# Patient Record
Sex: MALE | Race: WHITE | NOT HISPANIC OR LATINO | Employment: FULL TIME | URBAN - METROPOLITAN AREA
[De-identification: names, ages, dates, MRNs, and addresses within clinical notes are randomized per-mention and may not be internally consistent; named-entity substitution may affect disease eponyms.]

---
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
## 2019-05-10 ENCOUNTER — OFFICE VISIT (OUTPATIENT)
Dept: FAMILY MEDICINE | Facility: CLINIC | Age: 19
End: 2019-05-10
Payer: COMMERCIAL

## 2019-05-10 VITALS
HEIGHT: 71 IN | WEIGHT: 168 LBS | OXYGEN SATURATION: 99 % | BODY MASS INDEX: 23.52 KG/M2 | TEMPERATURE: 98.2 F | DIASTOLIC BLOOD PRESSURE: 74 MMHG | RESPIRATION RATE: 16 BRPM | HEART RATE: 69 BPM | SYSTOLIC BLOOD PRESSURE: 130 MMHG

## 2019-05-10 DIAGNOSIS — S43.61XS: Primary | ICD-10-CM

## 2019-05-10 PROCEDURE — 99213 OFFICE O/P EST LOW 20 MIN: CPT | Performed by: FAMILY MEDICINE

## 2019-05-10 RX ORDER — KETOROLAC TROMETHAMINE 10 MG/1
10 TABLET, FILM COATED ORAL EVERY 6 HOURS PRN
Qty: 20 TABLET | Refills: 0 | Status: SHIPPED | OUTPATIENT
Start: 2019-05-10 | End: 2019-08-05

## 2019-05-10 ASSESSMENT — MIFFLIN-ST. JEOR: SCORE: 1799.17

## 2019-05-10 ASSESSMENT — PATIENT HEALTH QUESTIONNAIRE - PHQ9
5. POOR APPETITE OR OVEREATING: SEVERAL DAYS
SUM OF ALL RESPONSES TO PHQ QUESTIONS 1-9: 1

## 2019-05-10 ASSESSMENT — ANXIETY QUESTIONNAIRES
2. NOT BEING ABLE TO STOP OR CONTROL WORRYING: NOT AT ALL
1. FEELING NERVOUS, ANXIOUS, OR ON EDGE: SEVERAL DAYS
3. WORRYING TOO MUCH ABOUT DIFFERENT THINGS: NOT AT ALL
7. FEELING AFRAID AS IF SOMETHING AWFUL MIGHT HAPPEN: NOT AT ALL
IF YOU CHECKED OFF ANY PROBLEMS ON THIS QUESTIONNAIRE, HOW DIFFICULT HAVE THESE PROBLEMS MADE IT FOR YOU TO DO YOUR WORK, TAKE CARE OF THINGS AT HOME, OR GET ALONG WITH OTHER PEOPLE: NOT DIFFICULT AT ALL
5. BEING SO RESTLESS THAT IT IS HARD TO SIT STILL: SEVERAL DAYS
GAD7 TOTAL SCORE: 5
6. BECOMING EASILY ANNOYED OR IRRITABLE: MORE THAN HALF THE DAYS

## 2019-05-10 NOTE — PATIENT INSTRUCTIONS
Thank you for choosing [facility] (formerly [facility]  You may be receiving an email and/or telephone survey request from [facility] Customer Experience regarding your visit today.  Please take a few minutes to respond to the survey to let us know how we are doing.      If you have questions or concerns, please contact us via Veacon or you can contact your care team at [phone].    Our Clinic hours are:  Monday 6:40 am  to 7:00 pm  Tuesday -Friday 6:40 am to 5:00 pm    The [place] outpatient lab hours are:  Monday - Friday 6:10 am to 4:45 pm  Saturdays 7:00 am to 11:00 am  Appointments are required, call [phone]    If you have clinical questions after hours or would like to schedule an appointment,  call the clinic at [phone].    (S43.61XS) Sternoclavicular (joint) (ligament) sprain, right, sequela  (primary encounter diagnosis)  Comment:   Plan: ketorolac (TORADOL) 10 MG tablet, ORTHO          REFERRAL        We discussed the issues and there is likely loose ligaments in the right Sternoclaviclar joint. Modify activities to avoid stressing the joint.   Use the ice on the area, and heat if there are tight muscles. Toradol at 10 mg per dose with non spicy food. Avoid aspirin and advil and aleve when on Toradol.   The referral to Ortho is done and go there now or call for the appt.

## 2019-05-10 NOTE — PROGRESS NOTES
"  SUBJECTIVE:   Ivan Guzman is a 18 year old male who presents to clinic today for the following   health issues:      COLLAR BONE      Duration: 4 years    Description (location/character/radiation): Right clavicle pain, if he moves the area a wrong way it will pop out and back in.    Intensity:  Moderate-pain daily.      Accompanying signs and symptoms: Daily pain, pops in and out about twice daily.  No swelling.    History (similar episodes/previous evaluation): Previous Chiropractor, PT and Cortisone injections.  Cortisone would help for one week then symptoms returned.      Precipitating or alleviating factors: From back behind his head and over, also just straight up will cause the issues.  Worse with weight and movement.    Therapies tried and outcome: Ibuprofen as needed.         Current Outpatient Medications:      buPROPion (WELLBUTRIN SR) 100 MG 12 hr tablet, Take 1 tablet (100 mg) by mouth daily (Patient not taking: Reported on 5/10/2019), Disp: 90 tablet, Rfl: 3     buPROPion (WELLBUTRIN SR) 200 MG 12 hr tablet, Take 1 tablet (200 mg) by mouth daily (Patient not taking: Reported on 5/10/2019), Disp: 90 tablet, Rfl: 3    Patient Active Problem List   Diagnosis     CARDIOVASCULAR SCREENING; LDL GOAL LESS THAN 160     Anxiety     Depression, unspecified depression type       Blood pressure 130/74, pulse 69, temperature 98.2  F (36.8  C), temperature source Tympanic, resp. rate 16, height 1.803 m (5' 11\"), weight 76.2 kg (168 lb), SpO2 99 %.    Exam:  GENERAL APPEARANCE: healthy, alert and no distress  MS: tender to palpation right sternoclavicular joint, and no other tenderness in the shoulders or clavicles or sternum. He has some limited ROM of the right shoulder.    SKIN: no suspicious lesions or rashes  PSYCH: mentation appears normal and affect normal/bright  \  (S43.61XS) Sternoclavicular (joint) (ligament) sprain, right, sequela  (primary encounter diagnosis)  Comment:   Plan: ketorolac (TORADOL) 10 " MG tablet, ORTHO          REFERRAL        We discussed the issues and there is likely loose ligaments in the right Sternoclaviclar joint. Modify activities to avoid stressing the joint.   Use the ice on the area, and heat if there are tight muscles. Toradol at 10 mg per dose with non spicy food. Avoid aspirin and advil and aleve when on Toradol.   The referral to Ortho is done and go there now or call for the appt.     Malcom Crawford

## 2019-05-11 ASSESSMENT — ANXIETY QUESTIONNAIRES: GAD7 TOTAL SCORE: 5

## 2019-05-17 ENCOUNTER — TRANSFERRED RECORDS (OUTPATIENT)
Dept: HEALTH INFORMATION MANAGEMENT | Facility: CLINIC | Age: 19
End: 2019-05-17

## 2019-06-04 ENCOUNTER — HOSPITAL ENCOUNTER (OUTPATIENT)
Dept: MRI IMAGING | Facility: CLINIC | Age: 19
Discharge: HOME | End: 2019-06-04
Attending: ORTHOPAEDIC SURGERY | Admitting: ORTHOPAEDIC SURGERY
Payer: COMMERCIAL

## 2019-06-04 DIAGNOSIS — M89.8X1 CLAVICLE PAIN: ICD-10-CM

## 2019-06-04 DIAGNOSIS — R07.89 STERNUM PAIN: ICD-10-CM

## 2019-06-04 PROCEDURE — 71550 MRI CHEST W/O DYE: CPT

## 2019-06-17 ENCOUNTER — ANCILLARY PROCEDURE (OUTPATIENT)
Dept: GENERAL RADIOLOGY | Facility: CLINIC | Age: 19
End: 2019-06-17
Attending: ORTHOPAEDIC SURGERY
Payer: COMMERCIAL

## 2019-06-17 ENCOUNTER — OFFICE VISIT (OUTPATIENT)
Dept: ORTHOPEDICS | Facility: CLINIC | Age: 19
End: 2019-06-17
Attending: FAMILY MEDICINE
Payer: COMMERCIAL

## 2019-06-17 ENCOUNTER — TRANSFERRED RECORDS (OUTPATIENT)
Dept: HEALTH INFORMATION MANAGEMENT | Facility: CLINIC | Age: 19
End: 2019-06-17

## 2019-06-17 ENCOUNTER — PRE VISIT (OUTPATIENT)
Dept: ORTHOPEDICS | Facility: CLINIC | Age: 19
End: 2019-06-17

## 2019-06-17 VITALS — BODY MASS INDEX: 24.36 KG/M2 | HEIGHT: 71 IN | WEIGHT: 174 LBS

## 2019-06-17 DIAGNOSIS — M25.511 PAIN OF RIGHT STERNOCLAVICULAR JOINT: Primary | ICD-10-CM

## 2019-06-17 DIAGNOSIS — M25.511 PAIN OF RIGHT STERNOCLAVICULAR JOINT: ICD-10-CM

## 2019-06-17 DIAGNOSIS — M25.511 STERNOCLAVICULAR JOINT PAIN, RIGHT: Primary | ICD-10-CM

## 2019-06-17 ASSESSMENT — MIFFLIN-ST. JEOR: SCORE: 1821.39

## 2019-06-17 NOTE — TELEPHONE ENCOUNTER
RECORDS RECEIVED FROM: Sternoclavicular (joint) (ligament) sprain, right, Records are in [facility], [facility] everywhere, [facility]. per pt. Referred by Malcom Crawford MD   DATE RECEIVED: Jun 17, 2019    NOTES STATUS DETAILS   OFFICE NOTE from referring provider Internal Dr. Crawford 5/10/19   OFFICE NOTE from other specialist Received in [facility] 5/17/19   DISCHARGE SUMMARY from hospital N/A    DISCHARGE REPORT from the ER N/A    OPERATIVE REPORT N/A    MEDICATION LIST Internal    IMPLANT RECORD/STICKER N/A    LABS     CBC/DIFF N/A    CULTURES N/A    INJECTIONS DONE IN RADIOLOGY N/A    MRI Internal 6/4/19   CT SCAN N/A    XRAYS (IMAGES & REPORTS) Internal 12/15/15   TUMOR     PATHOLOGY  Slides & report N/A      06/17/19   10:55 AM  No records found in Care Everywhere

## 2019-06-17 NOTE — LETTER
6/17/2019       RE: Ivan Guzman  [address]   [facility] [address]     Dear Colleague,    Thank you for referring your patient, Ivan Guzman, to the [facility]. Please see a copy of my visit note below.    Orthopedic Surgery Consult / History and Physical           Chief Concern:   Right SC joint instability           History of Present Illness:   This patient is a right hand dominant 19 year old male with no medical history who presents for evaluation of the above.    Patient reports no history of injury to the right shoulder girdle, however ever since he can remember as a child he has had an unstable right sternoclavicular joint which is resulted in subluxations and anterior dislocations.  The joint always auto reduces, he is never needed to be manipulated by a medical provider to reduce the joint.  For much of his adolescence this was a nonpainful phenomenon, however the last 4 years every time the joint has subluxed or dislocated it is become quite painful.  As result of this he no longer is able to do any overhead activities without subluxations of the joint.  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  At this point over the last 6 weeks but it is been so unstable and painful that he is only able to reach out to open doors with his right upper extremity as anything more than that will cause a painful subluxation or dislocation.       Symptom Profile  Location of symptoms:   Focused at right SC joint  Quality of symptoms:   Sharp, achy  Severity:   Moderate-severe  Exacerbating:    Overhead shoulder movements  Alleviate:   Arm below shoulder level  Previous Treatments: Steroid injection, oral steroids, physical therapy.  All without any benefit              Past Medical History:   None  No past medical history on file.         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    Social History     Tobacco Use     Smoking status: Never Smoker     Smokeless tobacco: Never Used   Substance Use Topics     Alcohol use: No            Family History:   Denies family history of bleeding or clotting disorders  Family History   Problem Relation Age of Onset     Hypertension Maternal Grandfather      Cancer Paternal Grandmother         colon cancer            Allergies:     Allergies   Allergen Reactions     Nka [No Known Allergies]             Medications:   Anticoagulants: None  Current Outpatient Medications   Medication     buPROPion (WELLBUTRIN SR) 100 MG 12 hr tablet     buPROPion (WELLBUTRIN SR) 200 MG 12 hr tablet     ketorolac (TORADOL) 10 MG tablet     No current facility-administered medications for this visit.               Physical Exam:   General: awake, alert, cooperative, no apparent distress, appears stated age  HEENT: normal  Respiratory: breathing non-labored  Cardiovascular: peripheral pulse palpable, capillary refill < 2sec  Skin: no rashes or lesions  Heme: No petechiae  Neurological: CN II-XII grossly intact  Musculoskeletal:   Right UE       Skin c/d/i        SILT R/U/M/Ax       Motor:  FPL, EPL, abductors with 5/5 strength       Perfusion:  Warm and well perfused, palpable radial pulse   ROM: Full range of motion present of the shoulder though associated with SC joint subluxation and  pain    Sternoclavicular joint clearly subluxes after 120 degrees of forward flexion, auto reduces the arm is let down   Moderate tenderness to palpation of the right sternoclavicular joint             Data:   Imaging:  Today's x-rays of the clavicles demonstrate wearing of the medial aspect of the right clavicle relative to the contralateral side.    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     Plan:  1. Discussed with the patient risks benefits alternatives of a right SC joint reconstruction with allograft.  We did discuss that there are significant risks involved given the proximity of the great vessels immediately posterior to the sternoclavicular joint.  In light of that we would do this in conjunction with a cardiothoracic surgeon available and this would be done on the [facility].  We noted that the outcomes from the surgery are not as pristine as most of the surgeries that we do, and we have found that a year after the operation, about 40% of the patients are back to their baseline and not feeling improved by the surgery at all.  2. Patient feels that the disability related to his right SC joint is great enough, that he does wish to proceed with surgery despite the associated risks of the surgery and also the risk of return to this functional status.  3. We will order a CT angiogram for evaluation of the aortic arch and its branches in their proximity to the sternoclavicular joint where we will be operating.  4. We will coordinate with our cardiothoracic surgery colleagues to find a time that would work for them to be on standby during the case.  Again the case would need to be done on the [facility].  As long as there are no complications, most patients leave and go  home the same day.      Seen and examined with Dr. Crane, documentation by:    Gabriel Cordova MD  Orthopaedic Surgery PGY-5  I saw the patient with the resident.  I agree with the resident note and plan of care.      Luca Crane MD

## 2019-06-17 NOTE — PROGRESS NOTES
Orthopedic Surgery Consult / History and Physical           Chief Concern:   Right SC joint instability           History of Present Illness:   This patient is a right hand dominant 19 year old male with no medical history who presents for evaluation of the above.    Patient reports no history of injury to the right shoulder girdle, however ever since he can remember as a child he has had an unstable right sternoclavicular joint which is resulted in subluxations and anterior dislocations.  The joint always auto reduces, he is never needed to be manipulated by a medical provider to reduce the joint.  For much of his adolescence this was a nonpainful phenomenon, however the last 4 years every time the joint has subluxed or dislocated it is become quite painful.  As result of this he no longer is able to do any overhead activities without subluxations of the joint.  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  At this point over the last 6 weeks but it is been so unstable and painful that he is only able to reach out to open doors with his right upper extremity as anything more than that will cause a painful subluxation or dislocation.       Symptom Profile  Location of symptoms:   Focused at right SC joint  Quality of symptoms:   Sharp, achy  Severity:   Moderate-severe  Exacerbating:    Overhead shoulder movements  Alleviate:   Arm below shoulder level  Previous Treatments: Steroid injection, oral steroids, physical therapy.  All without any benefit              Past Medical History:   None  No past medical history on file.         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    Social History     Tobacco Use     Smoking status: Never Smoker     Smokeless tobacco: Never Used   Substance Use Topics     Alcohol use: No            Family History:   Denies family history of bleeding or clotting disorders  Family History   Problem Relation Age of Onset     Hypertension Maternal Grandfather      Cancer Paternal Grandmother         colon cancer            Allergies:     Allergies   Allergen Reactions     Nka [No Known Allergies]             Medications:   Anticoagulants: None  Current Outpatient Medications   Medication     buPROPion (WELLBUTRIN SR) 100 MG 12 hr tablet     buPROPion (WELLBUTRIN SR) 200 MG 12 hr tablet     ketorolac (TORADOL) 10 MG tablet     No current facility-administered medications for this visit.               Physical Exam:   General: awake, alert, cooperative, no apparent distress, appears stated age  HEENT: normal  Respiratory: breathing non-labored  Cardiovascular: peripheral pulse palpable, capillary refill < 2sec  Skin: no rashes or lesions  Heme: No petechiae  Neurological: CN II-XII grossly intact  Musculoskeletal:   Right UE       Skin c/d/i        SILT R/U/M/Ax       Motor:  FPL, EPL, abductors with 5/5 strength       Perfusion:  Warm and well perfused, palpable radial pulse   ROM: Full range of motion present of the shoulder though associated with SC joint subluxation and pain    Sternoclavicular joint clearly subluxes after 120 degrees of forward flexion, auto reduces the arm is let down   Moderate tenderness to palpation of the right sternoclavicular joint             Data:   Imaging:  Today's x-rays of the clavicles demonstrate wearing of the medial aspect of the  right clavicle relative to the contralateral side.    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     Plan:  1. Discussed with the patient risks benefits alternatives of a right SC joint reconstruction with allograft.  We did discuss that there are significant risks involved given the proximity of the great vessels immediately posterior to the sternoclavicular joint.  In light of that we would do this in conjunction with a cardiothoracic surgeon available and this would be done on the [facility].  We noted that the outcomes from the surgery are not as pristine as most of the surgeries that we do, and we have found that a year after the operation, about 40% of the patients are back to their baseline and not feeling improved by the surgery at all.  2. Patient feels that the disability related to his right SC joint is great enough, that he does wish to proceed with surgery despite the associated risks of the surgery and also the risk of return to this functional status.  3. We will order a CT angiogram for evaluation of the aortic arch and its branches in their proximity to the sternoclavicular joint where we will be operating.  4. We will coordinate with our cardiothoracic surgery colleagues to find a time that would work for them to be on standby during the case.  Again the case would need to be done on the [facility].  As long as there are no complications, most patients leave and go home the same day.      Seen and examined with Dr. Crane, documentation by:    Gabriel Cordova MD  Orthopaedic Surgery PGY-5  I saw the patient with the resident.  I agree with the resident note and plan of care.      Luca Crane MD

## 2019-06-17 NOTE — NURSING NOTE
"Reason For Visit:   Chief Complaint   Patient presents with     Consult     Sternoclavicular joint sprain. Has issues with the clavicle popping in and out of joint.        PCP: Sebastien Guerrero    ?no  Occupation Not working  Date of injury: None  Date of surgery: None  Smoker: None    Right hand dominant    SANE score  Affected shoulder: Right  Right shoulder SANE: 40  Left shoulder SANE: 100    Dynamometer  Forward Elevation:  R = 10 pounds,  L = 15 pounds  External Rotation:   R = 15 pounds,  L = 16 pounds    Ht 1.803 m (5' 11\")   Wt 78.9 kg (174 lb)   BMI 24.27 kg/m        Pain Assessment  Patient Currently in Pain: Yes  0-10 Pain Scale: 5  Primary Pain Location: Shoulder  Pain Descriptors: Dull      Ryann Soto LPN      "

## 2019-06-18 ENCOUNTER — ANCILLARY PROCEDURE (OUTPATIENT)
Dept: CT IMAGING | Facility: CLINIC | Age: 19
End: 2019-06-18
Attending: ORTHOPAEDIC SURGERY
Payer: COMMERCIAL

## 2019-06-18 DIAGNOSIS — M25.511 STERNOCLAVICULAR JOINT PAIN, RIGHT: ICD-10-CM

## 2019-06-18 RX ORDER — IOPAMIDOL 755 MG/ML
80 INJECTION, SOLUTION INTRAVASCULAR ONCE
Status: COMPLETED | OUTPATIENT
Start: 2019-06-18 | End: 2019-06-18

## 2019-06-18 RX ORDER — IOPAMIDOL 755 MG/ML
100 INJECTION, SOLUTION INTRAVASCULAR ONCE
Status: DISCONTINUED | OUTPATIENT
Start: 2019-06-18 | End: 2019-06-18

## 2019-06-18 RX ADMIN — IOPAMIDOL 80 ML: 755 INJECTION, SOLUTION INTRAVASCULAR at 18:10

## 2019-06-18 NOTE — DISCHARGE INSTRUCTIONS

## 2019-08-05 ENCOUNTER — OFFICE VISIT (OUTPATIENT)
Dept: ORTHOPEDICS | Facility: CLINIC | Age: 19
End: 2019-08-05
Payer: COMMERCIAL

## 2019-08-05 DIAGNOSIS — M25.511 STERNOCLAVICULAR JOINT PAIN, RIGHT: Primary | ICD-10-CM

## 2019-08-05 NOTE — NURSING NOTE
Reason For Visit:   Chief Complaint   Patient presents with     RECHECK     F/U CT for sternoclavicular joint     PCP: Sebastien Guerrero     ?no  Occupation Not working  Date of injury: None  Date of surgery: None  Smoker: None     Right hand dominant      SANE score  Affected shoulder: Right  Right shoulder SANE: 25  Left shoulder SANE: 100    There were no vitals taken for this visit.      Pain Assessment  Patient Currently in Pain: Yes  0-10 Pain Scale: 4  Primary Pain Location: Shoulder  Pain Descriptors: Discomfort    Ryann Soto LPN

## 2019-08-05 NOTE — PROGRESS NOTES
CHIEF COMPLAINT:  Right shoulder sternoclavicular joint instability.      HISTORY OF PRESENT ILLNESS:  Mr. Guzman returns today for followup.  He notes that his symptoms are worsening.  He tolerated his CT scan well.      I reviewed with him the findings on his CT scan.  I told him that this is mostly for Dr. Bui to have a plan.  We will see him back on the date of his surgery.  He can of course come sooner should any additional problems arise.     Risks including recurrent instability and surgical injury to mediastinal structures was discussed as was the plan for rehabilitation post-operatively.    TT: 15 mins  CT: 12 mins

## 2019-08-05 NOTE — NURSING NOTE
Teaching Flowsheet   Relevant Diagnosis: Sternoclavicular joint instability  Teaching Topic:Pre-op for sternoclavicular joint reconstruction with allograft - Dr Tracie Bui will assist.  Surgery coordinator will call with surgery date     Person(s) involved in teaching:   Patient     Motivation Level:  Asks Questions: Yes  Cooperative: Yes  Receptive (willing/able to accept information): Yes  Any cultural factors/Hindu beliefs that may influence understanding or compliance? No     Patient demonstrates understanding of the following:  Reason for the appointment, diagnosis and treatment plan: Yes  Knowledge of proper use of medications and conditions for which they are ordered (with special attention to potential side effects or drug interactions): Yes  Which situations necessitate calling provider and whom to contact: Yes     Teaching Concerns Addressed:   Pre-op H&P by [facility] at [facility]  States his mother will assist with cares after surgery  Aware of post-op expectations     Proper use and care of sling x 6 weeks (medical equip, care aids, etc.): Yes  Nutritional needs and diet plan: Yes  Pain management techniques: Yes  Wound Care: Yes  How and/when to access community resources: Yes     Instructional Materials Used/Given: Pre-op packet, surgical soap

## 2019-08-05 NOTE — LETTER
8/5/2019       RE: Ivan Guzman  [address]   [facility] [address]     Dear Colleague,    Thank you for referring your patient, Ivan Guzman, to the [facility] at [facility]. Please see a copy of my visit note below.    CHIEF COMPLAINT:  Right shoulder sternoclavicular joint instability.      HISTORY OF PRESENT ILLNESS:  Mr. Guzman returns today for followup.  He notes that his symptoms are worsening.  He tolerated his CT scan well.      I reviewed with him the findings on his CT scan.  I told him that this is mostly for Dr. Bui to have a plan.  We will see him back on the date of his surgery.  He can of course come sooner should any additional problems arise.     Risks including recurrent instability and surgical injury to mediastinal structures was discussed as was the plan for rehabilitation post-operatively.    TT: 15 mins  CT: 12 mins    Again, thank you for allowing me to participate in the care of your patient.      Sincerely,    Luca Crane MD

## 2019-08-08 ENCOUNTER — TELEPHONE (OUTPATIENT)
Dept: ORTHOPEDICS | Facility: CLINIC | Age: 19
End: 2019-08-08

## 2019-08-08 NOTE — TELEPHONE ENCOUNTER
Patient is scheduled for surgery with Dr. Crane and Dr. Bui     Spoke or left message with: patient via phone call     Date of Surgery: 9/27/19     Location: [place]     Informed patient they will need an adult  yes    Post-ops made 2 wks and 6 wks with provider     Pre-op with surgeon (if applicable): yes     H&P: Scheduled with PCP     Additional imaging/appointments: n/a     Surgery packet: given in clinic     Additional comments: n/a

## 2019-09-18 ENCOUNTER — ANESTHESIA EVENT (OUTPATIENT)
Dept: SURGERY | Facility: CLINIC | Age: 19
End: 2019-09-18
Payer: COMMERCIAL

## 2019-09-23 ENCOUNTER — OFFICE VISIT (OUTPATIENT)
Dept: FAMILY MEDICINE | Facility: CLINIC | Age: 19
End: 2019-09-23
Payer: COMMERCIAL

## 2019-09-23 VITALS
BODY MASS INDEX: 23.24 KG/M2 | TEMPERATURE: 97.3 F | WEIGHT: 166 LBS | HEART RATE: 75 BPM | HEIGHT: 71 IN | RESPIRATION RATE: 18 BRPM | SYSTOLIC BLOOD PRESSURE: 128 MMHG | DIASTOLIC BLOOD PRESSURE: 70 MMHG | OXYGEN SATURATION: 98 %

## 2019-09-23 DIAGNOSIS — Z01.818 PREOP GENERAL PHYSICAL EXAM: Primary | ICD-10-CM

## 2019-09-23 DIAGNOSIS — F32.A DEPRESSION, UNSPECIFIED DEPRESSION TYPE: ICD-10-CM

## 2019-09-23 DIAGNOSIS — M25.511 PAIN OF RIGHT STERNOCLAVICULAR JOINT: ICD-10-CM

## 2019-09-23 LAB
BASOPHILS # BLD AUTO: 0.1 10E9/L (ref 0–0.2)
BASOPHILS NFR BLD AUTO: 0.8 %
DIFFERENTIAL METHOD BLD: NORMAL
EOSINOPHIL # BLD AUTO: 0.2 10E9/L (ref 0–0.7)
EOSINOPHIL NFR BLD AUTO: 3.4 %
ERYTHROCYTE [DISTWIDTH] IN BLOOD BY AUTOMATED COUNT: 11.2 % (ref 10–15)
HCT VFR BLD AUTO: 46.1 % (ref 40–53)
HGB BLD-MCNC: 15.5 G/DL (ref 13.3–17.7)
IMM GRANULOCYTES # BLD: 0 10E9/L (ref 0–0.4)
IMM GRANULOCYTES NFR BLD: 0 %
LYMPHOCYTES # BLD AUTO: 1.5 10E9/L (ref 0.8–5.3)
LYMPHOCYTES NFR BLD AUTO: 25.4 %
MCH RBC QN AUTO: 30.6 PG (ref 26.5–33)
MCHC RBC AUTO-ENTMCNC: 33.6 G/DL (ref 31.5–36.5)
MCV RBC AUTO: 91 FL (ref 78–100)
MONOCYTES # BLD AUTO: 0.5 10E9/L (ref 0–1.3)
MONOCYTES NFR BLD AUTO: 9.2 %
NEUTROPHILS # BLD AUTO: 3.6 10E9/L (ref 1.6–8.3)
NEUTROPHILS NFR BLD AUTO: 61.2 %
NRBC # BLD AUTO: 0 10*3/UL
NRBC BLD AUTO-RTO: 0 /100
PLATELET # BLD AUTO: 353 10E9/L (ref 150–450)
RBC # BLD AUTO: 5.07 10E12/L (ref 4.4–5.9)
WBC # BLD AUTO: 5.9 10E9/L (ref 4–11)

## 2019-09-23 PROCEDURE — 85025 COMPLETE CBC W/AUTO DIFF WBC: CPT | Performed by: FAMILY MEDICINE

## 2019-09-23 PROCEDURE — 36415 COLL VENOUS BLD VENIPUNCTURE: CPT | Performed by: FAMILY MEDICINE

## 2019-09-23 PROCEDURE — 99214 OFFICE O/P EST MOD 30 MIN: CPT | Performed by: FAMILY MEDICINE

## 2019-09-23 ASSESSMENT — MIFFLIN-ST. JEOR: SCORE: 1785.1

## 2019-09-23 NOTE — PROGRESS NOTES
[facility]  [address] [facility] [address]  [phone]  Dept: [phone]    PRE-OP EVALUATION:  Today's date: 2019    Ivan Guzman (: 2000) presents for pre-operative evaluation assessment as requested by Dr. Crane, Luca Lainez MD .  He requires evaluation and anesthesia risk assessment prior to undergoing surgery/procedure for treatment of .Right Sternoclavicular Joint Reconstruction With Allograft (Right Chest)     Proposed Surgery/ Procedure: Right Sternoclavicular Joint Reconstruction With Allograft (Right Chest)   Date of Surgery/ Procedure:19  Time of Surgery/ Procedure: [facility]  Hospital/Surgical Facility: [facility]     Primary Physician: Sebastien Guerrero  Type of Anesthesia Anticipated: General    Patient has a Health Care Directive or Living Will:  NO    1. NO - Do you have a history of heart attack, stroke, stent, bypass or surgery on an artery in the head, neck, heart or legs?  2. NO - Do you ever have any pain or discomfort in your chest?  3. NO - Do you have a history of  Heart Failure?  4. NO - Are you troubled by shortness of breath when: walking on the level, up a slight hill or at night?  5. NO - Do you currently have a cold, bronchitis or other respiratory infection?  6. NO - Do you have a cough, shortness of breath or wheezing?  7. NO - Do you sometimes get pains in the calves of your legs when you walk?  8. NO - Do you or anyone in your family have previous history of blood clots?  9. NO - Do you or does anyone in your family have a serious bleeding problem such as prolonged bleeding following surgeries or cuts?  10. NO - Have you ever had problems with anemia or been told to take iron pills?  11. NO - Have you had any abnormal blood loss such as black, tarry or bloody stools, or abnormal vaginal bleeding?  12. NO - Have you ever had a blood transfusion?  13. NO - Have you or any of your relatives ever had problems with anesthesia?  14. NO -  "Do you have sleep apnea, excessive snoring or daytime drowsiness?  15. NO - Do you have any prosthetic heart valves?  16. NO - Do you have prosthetic joints?  17. NO - Is there any chance that you may be pregnant?      HPI:     HPI related to upcoming procedure:       He is scheduled for right sternoclavicular joint reconstruction. The surgeon is requesting consultation regarding anesthesia and surgical risk for this patient with respect to current and past medical conditions.      MEDICAL HISTORY:     Patient Active Problem List    Diagnosis Date Noted     Depression, unspecified depression type 12/11/2017     Priority: Medium     Anxiety 04/13/2015     Priority: Medium     CARDIOVASCULAR SCREENING; LDL GOAL LESS THAN 160 12/14/2012     Priority: Medium      History reviewed. No pertinent past medical history.  Past Surgical History:   Procedure Laterality Date     GENITOURINARY SURGERY       none       ORCHIECTOMY SCROTAL  3/6/2014    Procedure: ORCHIECTOMY SCROTAL;  Scrotal Exploration,Left Orchidopexy, ,Possible Right Orchiectomy;  Surgeon: Nazario Alvarado MD;  Location: [facility] OR     No current outpatient medications on file.     OTC products: None, except as noted above    Allergies   Allergen Reactions     Nka [No Known Allergies]       Latex Allergy: NO    Social History     Tobacco Use     Smoking status: Never Smoker     Smokeless tobacco: Never Used   Substance Use Topics     Alcohol use: No     History   Drug Use No       REVIEW OF SYSTEMS:   Constitutional, neuro, ENT, endocrine, pulmonary, cardiac, gastrointestinal, genitourinary, musculoskeletal, integument and psychiatric systems are negative, except as otherwise noted.    EXAM:   /70   Pulse 75   Temp 97.3  F (36.3  C)   Resp 18   Ht 1.803 m (5' 11\")   Wt 75.3 kg (166 lb)   SpO2 98%   BMI 23.15 kg/m      GENERAL APPEARANCE: healthy, alert and no distress     EYES: EOMI,  PERRL     HENT: ear canals and TM's normal and nose and " mouth without ulcers or lesions     NECK: no adenopathy, no asymmetry, masses, or scars and thyroid normal to palpation     RESP: lungs clear to auscultation - no rales, rhonchi or wheezes     CV: regular rates and rhythm, normal S1 S2, no S3 or S4 and no murmur, click or rub     ABDOMEN:  soft, nontender, no HSM or masses and bowel sounds normal     MS: extremities normal- no gross deformities noted, no evidence of inflammation in joints, FROM in all extremities.     SKIN: no suspicious lesions or rashes     NEURO: Normal strength and tone, sensory exam grossly normal, mentation intact and speech normal     PSYCH: mentation appears normal. and affect normal/bright     LYMPHATICS: No cervical adenopathy    DIAGNOSTICS:   EKG: Not indicated due to non-vascular surgery and low risk of event (age <65 and without cardiac risk factors)    Recent Labs   Lab Test 03/03/16  0853 03/02/15  1639   HGB 15.5 14.7    232   NA  --  139   POTASSIUM  --  3.9   CR  --  0.95        IMPRESSION:   Reason for surgery/procedure: sternoclavicular joint instability  Diagnosis/reason for consult:    Preop general physical exam  Pain of right sternoclavicular joint  Depression, unspecified depression type      The proposed surgical procedure is considered LOW risk.    REVISED CARDIAC RISK INDEX  The patient has the following serious cardiovascular risks for perioperative complications such as (MI, PE, VFib and 3  AV Block):  No serious cardiac risks  INTERPRETATION: 0 risks: Class I (very low risk - 0.4% complication rate)    The patient has the following additional risks for perioperative complications:  No identified additional risks      ICD-10-CM    1. Preop general physical exam Z01.818        RECOMMENDATIONS:         --Patient is to take all scheduled medications on the day of surgery EXCEPT for modifications listed below.    APPROVAL GIVEN to proceed with proposed procedure, without further diagnostic evaluation       Signed  Electronically by: Sebastien Guerrero MD    Copy of this evaluation report is provided to requesting physician.    [facility] Preop Guidelines    Revised Cardiac Risk Index

## 2019-09-23 NOTE — LETTER
My Depression Action Plan  Name: Ivan Guzman   Date of Birth 2000  Date: 9/23/2019    My doctor: Sebastien Guerrero   My clinic: [facility]  [address] [facility] [address]  [phone]          GREEN    ZONE   Good Control    What it looks like:     Things are going generally well. You have normal up s and down s. You may even feel depressed from time to time, but bad moods usually last less than a day.   What you need to do:  1. Continue to care for yourself (see self care plan)  2. Check your depression survival kit and update it as needed  3. Follow your physician s recommendations including any medication.  4. Do not stop taking medication unless you consult with your physician first.           YELLOW         ZONE Getting Worse    What it looks like:     Depression is starting to interfere with your life.     It may be hard to get out of bed; you may be starting to isolate yourself from others.    Symptoms of depression are starting to last most all day and this has happened for several days.     You may have suicidal thoughts but they are not constant.   What you need to do:     1. Call your care team, your response to treatment will improve if you keep your care team informed of your progress. Yellow periods are signs an adjustment may need to be made.     2. Continue your self-care, even if you have to fake it!    3. Talk to someone in your support network    4. Open up your depression survival kit           RED    ZONE Medical Alert - Get Help    What it looks like:     Depression is seriously interfering with your life.     You may experience these or other symptoms: You can t get out of bed most days, can t work or engage in other necessary activities, you have trouble taking care of basic hygiene, or basic responsibilities, thoughts of suicide or death that will not go away, self-injurious behavior.     What you need to do:  1. Call your care team and  request a same-day appointment. If they are not available (weekends or after hours) call your local crisis line, emergency room or 911.            Depression Self Care Plan / Survival Kit    Self-Care for Depression  Here s the deal. Your body and mind are really not as separate as most people think.  What you do and think affects how you feel and how you feel influences what you do and think. This means if you do things that people who feel good do, it will help you feel better.  Sometimes this is all it takes.  There is also a place for medication and therapy depending on how severe your depression is, so be sure to consult with your medical provider and/ or Behavioral Health Consultant if your symptoms are worsening or not improving.     In order to better manage my stress, I will:    Exercise  Get some form of exercise, every day. This will help reduce pain and release endorphins, the  feel good  chemicals in your brain. This is almost as good as taking antidepressants!  This is not the same as joining a gym and then never going! (they count on that by the way ) It can be as simple as just going for a walk or doing some gardening, anything that will get you moving.      Hygiene   Maintain good hygiene (Get out of bed in the morning, Make your bed, Brush your teeth, Take a shower, and Get dressed like you were going to work, even if you are unemployed).  If your clothes don't fit try to get ones that do.    Diet  I will strive to eat foods that are good for me, drink plenty of water, and avoid excessive sugar, caffeine, alcohol, and other mood-altering substances.  Some foods that are helpful in depression are: complex carbohydrates, B vitamins, flaxseed, fish or fish oil, fresh fruits and vegetables.    Psychotherapy  I agree to participate in Individual Therapy (if recommended).    Medication  If prescribed medications, I agree to take them.  Missing doses can result in serious side effects.  I understand that  drinking alcohol, or other illicit drug use, may cause potential side effects.  I will not stop my medication abruptly without first discussing it with my provider.    Staying Connected With Others  I will stay in touch with my friends, family members, and my primary care provider/team.    Use your imagination  Be creative.  We all have a creative side; it doesn t matter if it s oil painting, sand castles, or mud pies! This will also kick up the endorphins.    Witness Beauty  (AKA stop and smell the roses) Take a look outside, even in mid-winter. Notice colors, textures. Watch the squirrels and birds.     Service to others  Be of service to others.  There is always someone else in need.  By helping others we can  get out of ourselves  and remember the really important things.  This also provides opportunities for practicing all the other parts of the program.    Humor  Laugh and be silly!  Adjust your TV habits for less news and crime-drama and more comedy.    Control your stress  Try breathing deep, massage therapy, biofeedback, and meditation. Find time to relax each day.     My support system    Clinic Contact:  Phone number:    Contact 1:  Phone number:    Contact 2:  Phone number:    Shinto/:  Phone number:    Therapist:  Phone number:    Local crisis center:    Phone number:    Other community support:  Phone number:

## 2019-09-23 NOTE — LETTER
[facility]  [address]  [facility] [address]  Phone: [phone]      9/24/2019     Ivan Guzman  [address]    [facility] [address]      Dear Ivan:    Thank you for allowing me to participate in your care. Your recent test results were reviewed and listed below.      Your results are provided below for your review    Results for orders placed or performed in visit on 09/23/19   CBC with platelets differential   Result Value Ref Range    WBC 5.9 4.0 - 11.0 10e9/L    RBC Count 5.07 4.4 - 5.9 10e12/L    Hemoglobin 15.5 13.3 - 17.7 g/dL    Hematocrit 46.1 40.0 - 53.0 %    MCV 91 78 - 100 fl    MCH 30.6 26.5 - 33.0 pg    MCHC 33.6 31.5 - 36.5 g/dL    RDW 11.2 10.0 - 15.0 %    Platelet Count 353 150 - 450 10e9/L    Diff Method Automated Method     % Neutrophils 61.2 %    % Lymphocytes 25.4 %    % Monocytes 9.2 %    % Eosinophils 3.4 %    % Basophils 0.8 %    % Immature Granulocytes 0.0 %    Nucleated RBCs 0 0 /100    Absolute Neutrophil 3.6 1.6 - 8.3 10e9/L    Absolute Lymphocytes 1.5 0.8 - 5.3 10e9/L    Absolute Monocytes 0.5 0.0 - 1.3 10e9/L    Absolute Eosinophils 0.2 0.0 - 0.7 10e9/L    Absolute Basophils 0.1 0.0 - 0.2 10e9/L    Abs Immature Granulocytes 0.0 0 - 0.4 10e9/L    Absolute Nucleated RBC 0.0         inform patient tests are acceptable        Thank you for choosing [facility]. As a result, please continue with the treatment plan discussed in the office. Return as discussed or sooner if symptoms worsen or fail to improve.     If you have any further questions or concerns, please do not hesitate to contact us.      Sincerely,        Dr. Sebastien Guerrero/Mckenzie Horton, CMA

## 2019-09-27 ENCOUNTER — ANESTHESIA (OUTPATIENT)
Dept: SURGERY | Facility: CLINIC | Age: 19
End: 2019-09-27
Payer: COMMERCIAL

## 2019-09-27 ENCOUNTER — HOSPITAL ENCOUNTER (OUTPATIENT)
Facility: CLINIC | Age: 19
Discharge: HOME | End: 2019-09-27
Attending: ORTHOPAEDIC SURGERY | Admitting: ORTHOPAEDIC SURGERY
Payer: COMMERCIAL

## 2019-09-27 VITALS
TEMPERATURE: 98.9 F | OXYGEN SATURATION: 93 % | HEART RATE: 85 BPM | SYSTOLIC BLOOD PRESSURE: 144 MMHG | RESPIRATION RATE: 16 BRPM | DIASTOLIC BLOOD PRESSURE: 72 MMHG

## 2019-09-27 DIAGNOSIS — S29.011S: Primary | ICD-10-CM

## 2019-09-27 LAB
ABO + RH BLD: NORMAL
ABO + RH BLD: NORMAL
BLD GP AB SCN SERPL QL: NORMAL
BLD PROD TYP BPU: NORMAL
BLD UNIT ID BPU: 0
BLD UNIT ID BPU: 0
BLOOD BANK CMNT PATIENT-IMP: NORMAL
BLOOD PRODUCT CODE: NORMAL
BLOOD PRODUCT CODE: NORMAL
BPU ID: NORMAL
BPU ID: NORMAL
GLUCOSE BLDC GLUCOMTR-MCNC: 85 MG/DL (ref 70–99)
NUM BPU REQUESTED: 2
SPECIMEN EXP DATE BLD: NORMAL
TRANSFUSION STATUS PATIENT QL: NORMAL

## 2019-09-27 PROCEDURE — 25800030 ZZH RX IP 258 OP 636: Performed by: ANESTHESIOLOGY

## 2019-09-27 PROCEDURE — 37000009 ZZH ANESTHESIA TECHNICAL FEE, EACH ADDTL 15 MIN: Performed by: ORTHOPAEDIC SURGERY

## 2019-09-27 PROCEDURE — 25000125 ZZHC RX 250: Performed by: NURSE ANESTHETIST, CERTIFIED REGISTERED

## 2019-09-27 PROCEDURE — 82962 GLUCOSE BLOOD TEST: CPT

## 2019-09-27 PROCEDURE — 86901 BLOOD TYPING SEROLOGIC RH(D): CPT | Performed by: ORTHOPAEDIC SURGERY

## 2019-09-27 PROCEDURE — 25000128 H RX IP 250 OP 636: Performed by: ORTHOPAEDIC SURGERY

## 2019-09-27 PROCEDURE — 25000128 H RX IP 250 OP 636: Performed by: NURSE ANESTHETIST, CERTIFIED REGISTERED

## 2019-09-27 PROCEDURE — 71000027 ZZH RECOVERY PHASE 2 EACH 15 MINS: Performed by: ORTHOPAEDIC SURGERY

## 2019-09-27 PROCEDURE — 40000170 ZZH STATISTIC PRE-PROCEDURE ASSESSMENT II: Performed by: ORTHOPAEDIC SURGERY

## 2019-09-27 PROCEDURE — 86900 BLOOD TYPING SEROLOGIC ABO: CPT | Performed by: ORTHOPAEDIC SURGERY

## 2019-09-27 PROCEDURE — 86923 COMPATIBILITY TEST ELECTRIC: CPT | Performed by: ORTHOPAEDIC SURGERY

## 2019-09-27 PROCEDURE — 27110028 ZZH OR GENERAL SUPPLY NON-STERILE: Performed by: ORTHOPAEDIC SURGERY

## 2019-09-27 PROCEDURE — C1762 CONN TISS, HUMAN(INC FASCIA): HCPCS | Performed by: ORTHOPAEDIC SURGERY

## 2019-09-27 PROCEDURE — 25000566 ZZH SEVOFLURANE, EA 15 MIN: Performed by: ORTHOPAEDIC SURGERY

## 2019-09-27 PROCEDURE — 25000128 H RX IP 250 OP 636: Performed by: ANESTHESIOLOGY

## 2019-09-27 PROCEDURE — 71000014 ZZH RECOVERY PHASE 1 LEVEL 2 FIRST HR: Performed by: ORTHOPAEDIC SURGERY

## 2019-09-27 PROCEDURE — 36000059 ZZH SURGERY LEVEL 3 EA 15 ADDTL MIN UMMC: Performed by: ORTHOPAEDIC SURGERY

## 2019-09-27 PROCEDURE — 27211024 ZZHC OR SUPPLY OTHER OPNP: Performed by: ORTHOPAEDIC SURGERY

## 2019-09-27 PROCEDURE — 36000061 ZZH SURGERY LEVEL 3 W FLUORO 1ST 30 MIN - UMMC: Performed by: ORTHOPAEDIC SURGERY

## 2019-09-27 PROCEDURE — 27210794 ZZH OR GENERAL SUPPLY STERILE: Performed by: ORTHOPAEDIC SURGERY

## 2019-09-27 PROCEDURE — 86850 RBC ANTIBODY SCREEN: CPT | Performed by: ORTHOPAEDIC SURGERY

## 2019-09-27 PROCEDURE — 37000008 ZZH ANESTHESIA TECHNICAL FEE, 1ST 30 MIN: Performed by: ORTHOPAEDIC SURGERY

## 2019-09-27 RX ORDER — FENTANYL CITRATE 50 UG/ML
INJECTION, SOLUTION INTRAMUSCULAR; INTRAVENOUS PRN
Status: DISCONTINUED | OUTPATIENT
Start: 2019-09-27 | End: 2019-09-27

## 2019-09-27 RX ORDER — OXYCODONE HYDROCHLORIDE 5 MG/1
5 TABLET ORAL EVERY 6 HOURS PRN
Qty: 30 TABLET | Refills: 0 | Status: SHIPPED | OUTPATIENT
Start: 2019-09-27 | End: 2022-02-07

## 2019-09-27 RX ORDER — IBUPROFEN 800 MG/1
800 TABLET, FILM COATED ORAL
Qty: 42 TABLET | Refills: 0 | Status: SHIPPED | OUTPATIENT
Start: 2019-09-27 | End: 2022-02-07

## 2019-09-27 RX ORDER — ONDANSETRON 4 MG/1
4 TABLET, ORALLY DISINTEGRATING ORAL EVERY 30 MIN PRN
Status: DISCONTINUED | OUTPATIENT
Start: 2019-09-27 | End: 2019-09-27 | Stop reason: HOSPADM

## 2019-09-27 RX ORDER — HYDROXYZINE PAMOATE 25 MG/1
25 CAPSULE ORAL 3 TIMES DAILY PRN
Qty: 30 CAPSULE | Refills: 0 | Status: SHIPPED | OUTPATIENT
Start: 2019-09-27 | End: 2022-02-07

## 2019-09-27 RX ORDER — ONDANSETRON 2 MG/ML
4 INJECTION INTRAMUSCULAR; INTRAVENOUS EVERY 30 MIN PRN
Status: DISCONTINUED | OUTPATIENT
Start: 2019-09-27 | End: 2019-09-27 | Stop reason: HOSPADM

## 2019-09-27 RX ORDER — LABETALOL 20 MG/4 ML (5 MG/ML) INTRAVENOUS SYRINGE
10
Status: DISCONTINUED | OUTPATIENT
Start: 2019-09-27 | End: 2019-09-27 | Stop reason: HOSPADM

## 2019-09-27 RX ORDER — NALOXONE HYDROCHLORIDE 0.4 MG/ML
.1-.4 INJECTION, SOLUTION INTRAMUSCULAR; INTRAVENOUS; SUBCUTANEOUS
Status: DISCONTINUED | OUTPATIENT
Start: 2019-09-27 | End: 2019-09-27 | Stop reason: HOSPADM

## 2019-09-27 RX ORDER — SODIUM CHLORIDE, SODIUM LACTATE, POTASSIUM CHLORIDE, CALCIUM CHLORIDE 600; 310; 30; 20 MG/100ML; MG/100ML; MG/100ML; MG/100ML
INJECTION, SOLUTION INTRAVENOUS CONTINUOUS
Status: DISCONTINUED | OUTPATIENT
Start: 2019-09-27 | End: 2019-09-27 | Stop reason: HOSPADM

## 2019-09-27 RX ORDER — HYDROMORPHONE HYDROCHLORIDE 1 MG/ML
.3-.5 INJECTION, SOLUTION INTRAMUSCULAR; INTRAVENOUS; SUBCUTANEOUS EVERY 5 MIN PRN
Status: DISCONTINUED | OUTPATIENT
Start: 2019-09-27 | End: 2019-09-27 | Stop reason: HOSPADM

## 2019-09-27 RX ORDER — PROPOFOL 10 MG/ML
INJECTION, EMULSION INTRAVENOUS PRN
Status: DISCONTINUED | OUTPATIENT
Start: 2019-09-27 | End: 2019-09-27

## 2019-09-27 RX ORDER — ONDANSETRON 2 MG/ML
INJECTION INTRAMUSCULAR; INTRAVENOUS PRN
Status: DISCONTINUED | OUTPATIENT
Start: 2019-09-27 | End: 2019-09-27

## 2019-09-27 RX ORDER — GABAPENTIN 300 MG/1
300 CAPSULE ORAL AT BEDTIME
Qty: 30 CAPSULE | Refills: 0 | Status: SHIPPED | OUTPATIENT
Start: 2019-09-27 | End: 2022-02-07

## 2019-09-27 RX ORDER — OXYCODONE HYDROCHLORIDE 5 MG/1
5 TABLET ORAL EVERY 6 HOURS PRN
Qty: 30 TABLET | Refills: 0 | Status: SHIPPED | OUTPATIENT
Start: 2019-09-27 | End: 2022-06-21

## 2019-09-27 RX ORDER — DEXAMETHASONE SODIUM PHOSPHATE 4 MG/ML
INJECTION, SOLUTION INTRA-ARTICULAR; INTRALESIONAL; INTRAMUSCULAR; INTRAVENOUS; SOFT TISSUE PRN
Status: DISCONTINUED | OUTPATIENT
Start: 2019-09-27 | End: 2019-09-27

## 2019-09-27 RX ORDER — CEFAZOLIN SODIUM 2 G/100ML
2 INJECTION, SOLUTION INTRAVENOUS
Status: COMPLETED | OUTPATIENT
Start: 2019-09-27 | End: 2019-09-27

## 2019-09-27 RX ORDER — CEFAZOLIN SODIUM 1 G/3ML
1 INJECTION, POWDER, FOR SOLUTION INTRAMUSCULAR; INTRAVENOUS SEE ADMIN INSTRUCTIONS
Status: DISCONTINUED | OUTPATIENT
Start: 2019-09-27 | End: 2019-09-27 | Stop reason: HOSPADM

## 2019-09-27 RX ORDER — LIDOCAINE 40 MG/G
CREAM TOPICAL
Status: DISCONTINUED | OUTPATIENT
Start: 2019-09-27 | End: 2019-09-27 | Stop reason: HOSPADM

## 2019-09-27 RX ORDER — FENTANYL CITRATE 50 UG/ML
25-50 INJECTION, SOLUTION INTRAMUSCULAR; INTRAVENOUS
Status: DISCONTINUED | OUTPATIENT
Start: 2019-09-27 | End: 2019-09-27 | Stop reason: HOSPADM

## 2019-09-27 RX ORDER — LIDOCAINE HYDROCHLORIDE 20 MG/ML
INJECTION, SOLUTION INFILTRATION; PERINEURAL PRN
Status: DISCONTINUED | OUTPATIENT
Start: 2019-09-27 | End: 2019-09-27

## 2019-09-27 RX ADMIN — ONDANSETRON 4 MG: 2 INJECTION INTRAMUSCULAR; INTRAVENOUS at 18:50

## 2019-09-27 RX ADMIN — FENTANYL CITRATE 100 MCG: 50 INJECTION, SOLUTION INTRAMUSCULAR; INTRAVENOUS at 16:17

## 2019-09-27 RX ADMIN — ROCURONIUM BROMIDE 20 MG: 10 INJECTION INTRAVENOUS at 16:50

## 2019-09-27 RX ADMIN — SUGAMMADEX 150 MG: 100 INJECTION, SOLUTION INTRAVENOUS at 17:35

## 2019-09-27 RX ADMIN — ROCURONIUM BROMIDE 10 MG: 10 INJECTION INTRAVENOUS at 17:21

## 2019-09-27 RX ADMIN — HYDROMORPHONE HYDROCHLORIDE 0.2 MG: 1 INJECTION, SOLUTION INTRAMUSCULAR; INTRAVENOUS; SUBCUTANEOUS at 18:38

## 2019-09-27 RX ADMIN — CEFAZOLIN SODIUM 2 G: 2 INJECTION, SOLUTION INTRAVENOUS at 16:30

## 2019-09-27 RX ADMIN — LIDOCAINE HYDROCHLORIDE 100 MG: 20 INJECTION, SOLUTION INFILTRATION; PERINEURAL at 16:17

## 2019-09-27 RX ADMIN — HYDROMORPHONE HYDROCHLORIDE 0.3 MG: 1 INJECTION, SOLUTION INTRAMUSCULAR; INTRAVENOUS; SUBCUTANEOUS at 18:29

## 2019-09-27 RX ADMIN — SODIUM CHLORIDE, POTASSIUM CHLORIDE, SODIUM LACTATE AND CALCIUM CHLORIDE: 600; 310; 30; 20 INJECTION, SOLUTION INTRAVENOUS at 16:08

## 2019-09-27 RX ADMIN — ROCURONIUM BROMIDE 50 MG: 10 INJECTION INTRAVENOUS at 16:17

## 2019-09-27 RX ADMIN — PROPOFOL 200 MG: 10 INJECTION, EMULSION INTRAVENOUS at 16:17

## 2019-09-27 RX ADMIN — ONDANSETRON 4 MG: 2 INJECTION INTRAMUSCULAR; INTRAVENOUS at 16:29

## 2019-09-27 RX ADMIN — MIDAZOLAM 2 MG: 1 INJECTION INTRAMUSCULAR; INTRAVENOUS at 16:08

## 2019-09-27 RX ADMIN — DEXAMETHASONE SODIUM PHOSPHATE 6 MG: 4 INJECTION, SOLUTION INTRA-ARTICULAR; INTRALESIONAL; INTRAMUSCULAR; INTRAVENOUS; SOFT TISSUE at 16:28

## 2019-09-27 RX ADMIN — FENTANYL CITRATE 100 MCG: 50 INJECTION, SOLUTION INTRAMUSCULAR; INTRAVENOUS at 16:51

## 2019-09-27 RX ADMIN — HYDROMORPHONE HYDROCHLORIDE 0.5 MG: 1 INJECTION, SOLUTION INTRAMUSCULAR; INTRAVENOUS; SUBCUTANEOUS at 17:27

## 2019-09-27 NOTE — ANESTHESIA POSTPROCEDURE EVALUATION
Anesthesia POST Procedure Evaluation    Patient: Ivan Guzman   MRN:     [id number] Gender:   male   Age:    19 year old :      2000        Preoperative Diagnosis: Right Sternoclavicular Joint Pain   Procedure(s):  Right Sternoclavicular Joint Reconstruction With Allograft   Postop Comments: No value filed.       Anesthesia Type:  Not documented  General    Reportable Event: NO     PAIN: Uncomplicated   Sign Out status: Comfortable, Well controlled pain     PONV: No PONV   Sign Out status:  No Nausea or Vomiting     Neuro/Psych: Uneventful perioperative course   Sign Out Status: Preoperative baseline; Age appropriate mentation     Airway/Resp.: Uneventful perioperative course   Sign Out Status: Non labored breathing, age appropriate RR; Resp. Status within EXPECTED Parameters     CV: Uneventful perioperative course   Sign Out status: Appropriate BP and perfusion indices; Appropriate HR/Rhythm     Disposition:   Sign Out in:  PACU  Disposition:  Phase II; Home  Recovery Course: Uneventful  Follow-Up: Not required           Last Anesthesia Record Vitals:  CRNA VITALS  2019 1713 - 2019 1753      2019             Resp Rate (observed):  (!) 3          Last PACU Vitals:  No vitals data found for the desired time range.        Electronically Signed By: Xavi Tucker MD, 2019, 5:53 PM

## 2019-09-27 NOTE — ANESTHESIA PREPROCEDURE EVALUATION
Anesthesia Pre-Procedure Evaluation    Patient: Ivan Guzman   MRN:     [id number] Gender:   male   Age:    19 year old :      2000        Preoperative Diagnosis: Right Sternoclavicular Joint Pain   Procedure(s):  Right Sternoclavicular Joint Reconstruction With Allograft     No past medical history on file.   Past Surgical History:   Procedure Laterality Date     GENITOURINARY SURGERY       none       ORCHIECTOMY SCROTAL  3/6/2014    Procedure: ORCHIECTOMY SCROTAL;  Scrotal Exploration,Left Orchidopexy, ,Possible Right Orchiectomy;  Surgeon: Nazario Alvarado MD;  Location: [facility] OR          Anesthesia Evaluation     . Pt has had prior anesthetic. Type: General           ROS/MED HX    ENT/Pulmonary:  - neg pulmonary ROS     Neurologic:  - neg neurologic ROS     Cardiovascular:  - neg cardiovascular ROS       METS/Exercise Tolerance:     Hematologic:  - neg hematologic  ROS       Musculoskeletal:   (+)  other musculoskeletal- right sternoclavicular joint pain      GI/Hepatic:  - neg GI/hepatic ROS       Renal/Genitourinary:  - ROS Renal section negative       Endo:  - neg endo ROS       Psychiatric:     (+) psychiatric history depression      Infectious Disease:  - neg infectious disease ROS       Malignancy:      - no malignancy   Other:    - neg other ROS                     PHYSICAL EXAM:   Mental Status/Neuro: A/A/O   Airway: Facies: Feasible  Mallampati: I  Mouth/Opening: Full  TM distance: > 6 cm  Neck ROM: Full   Respiratory: Auscultation: CTAB     Resp. Rate: Normal     Resp. Effort: Normal      CV: Rhythm: Regular  Rate: Age appropriate  Heart: Normal Sounds  Edema: None   Comments:      Dental: Normal Dentition                LABS:  CBC:   Lab Results   Component Value Date    WBC 5.9 2019    WBC 4.5 2016    HGB 15.5 2019    HGB 15.5 2016    HCT 46.1 2019    HCT 44.0 2016     2019     2016     BMP:   Lab Results   Component Value  "Date     03/02/2015    POTASSIUM 3.9 03/02/2015    CHLORIDE 106 03/02/2015    CO2 26 03/02/2015    BUN 17 03/02/2015    CR 0.95 03/02/2015    GLC 81 03/02/2015     COAGS: No results found for: PTT, INR, FIBR  POC: No results found for: BGM, HCG, HCGS  OTHER:   Lab Results   Component Value Date    COURTNEY 8.6 (L) 03/02/2015    ALBUMIN 4.5 03/02/2015    PROTTOTAL 7.7 03/02/2015    ALT 20 03/02/2015    AST 22 03/02/2015    ALKPHOS 183 03/02/2015    BILITOTAL 0.6 03/02/2015    TSH 1.85 03/02/2015    SED 4 03/02/2015        Preop Vitals    BP Readings from Last 3 Encounters:   09/23/19 128/70   05/10/19 130/74   12/06/17 149/77    Pulse Readings from Last 3 Encounters:   09/23/19 75   05/10/19 69   12/06/17 78      Resp Readings from Last 3 Encounters:   09/23/19 18   05/10/19 16   12/06/17 18    SpO2 Readings from Last 3 Encounters:   09/23/19 98%   05/10/19 99%   02/09/17 98%      Temp Readings from Last 1 Encounters:   09/23/19 36.3  C (97.3  F)    Ht Readings from Last 1 Encounters:   09/23/19 1.803 m (5' 11\")      Wt Readings from Last 1 Encounters:   09/23/19 75.3 kg (166 lb)    Estimated body mass index is 23.15 kg/m  as calculated from the following:    Height as of 9/23/19: 1.803 m (5' 11\").    Weight as of 9/23/19: 75.3 kg (166 lb).     LDA:        Assessment:   ASA SCORE: 1       NPO Status: NPO Appropriate     Plan:   Anes. Type:  General   Pre-Medication: None   Induction:  IV (Standard)   Airway: ETT; Oral   Access/Monitoring: PIV   Maintenance: Balanced     Postop Plan:   Postop Pain: Opioids  Postop Sedation/Airway: Not planned     PONV Management:   Adult Risk Factors:, Postop Opioids   Prevention: Ondansetron, Dexamethasone     CONSENT: Direct conversation   Plan and risks discussed with: Patient                      Xavi Tucker MD  "

## 2019-09-27 NOTE — PROGRESS NOTES
S: Pt not seen at [facility] only dropped off RT Ultra sling 4 delivery [facility] OR desk. O: I see the EPIC order for the Large Ultra sling for the [facility] OR. A: I dropped off the Ultra sling 4 size large at the OR main desk with instructions. P: FU PRN. G: The goal is to stabilize the RT Upper extremity.  Electronically signed Rick Tesfaye CPO, LPO.

## 2019-09-27 NOTE — BRIEF OP NOTE
[facility], [place]    Brief Operative Note    Pre-operative diagnosis: Right Sternoclavicular Joint Pain  Post-operative diagnosis * No post-op diagnosis entered *  Procedure: Procedure(s):  Right Sternoclavicular Joint Reconstruction With Allograft  Surgeon: Surgeon(s) and Role:     * Luca Crane MD - Primary     * Jaimie Bui MD - Assisting  Anesthesia: General   Estimated blood loss: 50 mL  Drains: None  Specimens: * No specimens in log *  Findings:   None.  Complications: None.  Implants: allograft semitendinosus

## 2019-09-27 NOTE — ANESTHESIA CARE TRANSFER NOTE
Patient: Ivan Guzman    Procedure(s):  Right Sternoclavicular Joint Reconstruction With Allograft    Diagnosis: Right Sternoclavicular Joint Pain  Diagnosis Additional Information: No value filed.    Anesthesia Type:   General     Note:  Airway :Nasal Cannula  Patient transferred to:PACU  Comments: To PACU on supplemental O2 with + air exchange, placed on monitor. Report given to RN, questions answered, VSS, patient alert and comfortable.Handoff Report: Identifed the Patient, Identified the Reponsible Provider, Reviewed the pertinent medical history, Discussed the surgical course, Reviewed Intra-OP anesthesia mangement and issues during anesthesia, Set expectations for post-procedure period and Allowed opportunity for questions and acknowledgement of understanding      Vitals: (Last set prior to Anesthesia Care Transfer)    CRNA VITALS  9/27/2019 1713 - 9/27/2019 1748      9/27/2019             Resp Rate (observed):  (!) 3              Electronically Signed By: ASHLEY Hu CRNA  September 27, 2019  5:48 PM

## 2019-09-28 NOTE — DISCHARGE INSTRUCTIONS
[facility]  Same-Day Surgery   Adult Discharge Orders & Instructions     For 24 hours after surgery    1. Get plenty of rest.  A responsible adult must stay with you for at least 24 hours after you leave the hospital.   2. Do not drive or use heavy equipment.  If you have weakness or tingling, don't drive or use heavy equipment until this feeling goes away.  3. Do not drink alcohol.  4. Avoid strenuous or risky activities.  Ask for help when climbing stairs.   5. You may feel lightheaded.  IF so, sit for a few minutes before standing.  Have someone help you get up.   6. If you have nausea (feel sick to your stomach): Drink only clear liquids such as apple juice, ginger ale, broth or 7-Up.  Rest may also help.  Be sure to drink enough fluids.  Move to a regular diet as you feel able.  7. You may have a slight fever. Call the doctor if your fever is over 100 F (37.7 C) (taken under the tongue) or lasts longer than 24 hours.  8. You may have a dry mouth, a sore throat, muscle aches or trouble sleeping.  These should go away after 24 hours.  9. Do not make important or legal decisions.   Call your doctor for any of the followin.  Signs of infection (fever, growing tenderness at the surgery site, a large amount of drainage or bleeding, severe pain, foul-smelling drainage, redness, swelling).    2. It has been over 8 to 10 hours since surgery and you are still not able to urinate (pass water).    3.  Headache for over 24 hours.    To contact a doctor during clinic hours, call Dr. Crane's clinic at [phone] or:        After hours:  [phone] and ask for the resident on call for Orthopedic Surgery (answered 24 hours a day)      Emergency Department:    [facility]: [phone]       (TTY for hearing impaired: [phone])

## 2019-09-28 NOTE — OP NOTE
"Procedure Date: 09/27/2019      PREOPERATIVE DIAGNOSES:  Right shoulder painful unstable sternoclavicular joint.      POSTOPERATIVE DIAGNOSES:  Right shoulder painful unstable sternoclavicular joint.      SURGICAL PROCEDURES:  Right shoulder sternoclavicular joint reconstruction with allograft semitendinosis.      SURGEON:  Luca Crane MD      ASSISTANT:  Tracie Bui MD.  The assistance of Dr. Bui was necessitated by the proximity to the great vessels and her expertise in Thoracic Surgery, and the need to hold retractors and position the arm in space.  No other level of surgical assistant would provide standard care support for the patient's surgical best interest.      ANESTHESIA:  General endotracheal anesthesia.      ESTIMATED BLOOD LOSS:  50 mL.      IMPLANTS:  Semitendinosis allograft.      INDICATIONS:  Mr. Guzman is a very pleasant 19-year-old male with a longstanding history of pain and disability in his shoulder.  His preoperative evaluation was consistent with the above.  After discussion of risks and benefits of surgical versus nonsurgical management, he elected to proceed with surgical remediation.      DESCRIPTION OF THE PROCEDURE:  The patient was positively identified in the preanesthesia care area, surgical site was initialed by me and his consent was reviewed with him.  He was then taken to the operating theater where he was placed in well-padded supine position and prepped and draped in the usual sterile fashion for right sternoclavicular joint surgery.      Prior to surgical initiation, a \"time out\" was held in accordance with hospital policy.  Verbal verification of delivery of prophylactic intravenous antibiotics was performed.  After establishment of his 4 cm vertical incision over the sternoclavicular joint, I was able to identify anterior fascia.  I  this longitudinally so that I could access the sternoclavicular joint.  I bluntly dissected on the posterior aspect of it " after removing a degenerated intra-articular meniscus.  Once I did so, I was able to place the PCL guide and drill with a guide pin.  This was in appropriate position.  I then prepared the graft with a suture at each end.  I sized it, found it to be a size 5.  I drilled a 5.5 hole and protected the posterior aspect of the sternum.  I was able to pass the sutures through using a suture passing device.  I then repeated this with an anterior to posterior drill hole in the clavicle.  I passed the graft through this and tied in a loose fashion.  This effected an excellent stable sternoclavicular joint with no evidence of instability when he moves the arm in the operating room.  The wound was copiously irrigated and  the anterior fascia was reapproximated using a #2 FiberWire through bone tunnels.  The ends were sewn together with #2 FiberWire and trimmed.  Wound was copiously irrigated and closed with 0 Vicryl, 2-0 Vicryl and 3-0 running Monocryl.  Steri-Strips and sterile nonadherent dressing were applied.  A sling was placed.      POSTOPERATIVE PLAN:   1.  The patient will be discharged home today on oral analgesics.  He should have no active or passive range of motion at this time.   2.  In 2 weeks, he will continue without any active or passive range of motion.   3.  At 6 weeks, he will begin activities of daily living.  A sling will be discontinued.   4.  At 3 months, he will have unrestrictive 4-quadrant stretching, periscapular stabilization and strengthening.  Radiographs will also be obtained at that visit (serendipity view and AP of the bilateral clavicles on 1 plate).         JESSICA SOTO MD             D: 2019   T: 2019   MT: TRINIDAD      Name:     CORY DE LEON   MRN:      [id number]        Account:        [id number]   :      2000           Procedure Date: 2019      Document: [id number]

## 2019-10-14 ENCOUNTER — OFFICE VISIT (OUTPATIENT)
Dept: ORTHOPEDICS | Facility: CLINIC | Age: 19
End: 2019-10-14
Payer: COMMERCIAL

## 2019-10-14 DIAGNOSIS — M25.511 STERNOCLAVICULAR JOINT PAIN, RIGHT: Primary | ICD-10-CM

## 2019-10-14 NOTE — LETTER
"10/14/2019       RE: Ivan Guzman  [address]   [facility] [address]     Dear Colleague,    Thank you for referring your patient, Ivan Guzman, to the [facility] at [facility]. Please see a copy of my visit note below.    S:  Doing well. Pain is \"a two.\"    O:  Incision clean, dry, and intact  Sets Deltoid  Wiggles fingers  Warm and well-perfused hand with palpable pulse  Suture ends trimmed.    A/P:  Doing well  Advance per op report  Doing well enough that no visit is needed at six weeks. May remove sling at that time and begin ADLs.   F/U at three months.     Again, thank you for allowing me to participate in the care of your patient.      Sincerely,    Luca Crane MD      "

## 2019-10-14 NOTE — PROGRESS NOTES
"S:  Doing well. Pain is \"a two.\"    O:  Incision clean, dry, and intact  Sets Deltoid  Wiggles fingers  Warm and well-perfused hand with palpable pulse  Suture ends trimmed.    A/P:  Doing well  Advance per op report  Doing well enough that no visit is needed at six weeks. May remove sling at that time and begin ADLs.   F/U at three months.   "

## 2019-10-14 NOTE — NURSING NOTE
Reason For Visit:   Chief Complaint   Patient presents with     Surgical Followup     DOS 9/27/19 S/P Right shoulder sternoclavicular joint reconstruction with allograft semitendinosis.      PCP: Sebastien Guerrero     ?no  Occupation Not working  Date of injury: None  Date of surgery: None  Smoker: None     Right hand dominant      SANE score  Affected shoulder: Right   Right shoulder SANE: 0  Left shoulder SANE: 100    There were no vitals taken for this visit.      Pain Assessment  Patient Currently in Pain: Yes  0-10 Pain Scale: 2  Primary Pain Location: Shoulder  Pain Descriptors: Discomfort    Ryann Soto LPN

## 2019-12-06 DIAGNOSIS — M25.511 STERNOCLAVICULAR JOINT PAIN, RIGHT: Primary | ICD-10-CM

## 2019-12-16 ENCOUNTER — ANCILLARY PROCEDURE (OUTPATIENT)
Dept: GENERAL RADIOLOGY | Facility: CLINIC | Age: 19
End: 2019-12-16
Attending: ORTHOPAEDIC SURGERY
Payer: COMMERCIAL

## 2019-12-16 ENCOUNTER — OFFICE VISIT (OUTPATIENT)
Dept: ORTHOPEDICS | Facility: CLINIC | Age: 19
End: 2019-12-16
Payer: COMMERCIAL

## 2019-12-16 DIAGNOSIS — M25.511 STERNOCLAVICULAR JOINT PAIN, RIGHT: ICD-10-CM

## 2019-12-16 DIAGNOSIS — M25.511 STERNOCLAVICULAR JOINT PAIN, RIGHT: Primary | ICD-10-CM

## 2019-12-16 ASSESSMENT — PATIENT HEALTH QUESTIONNAIRE - PHQ9
SUM OF ALL RESPONSES TO PHQ QUESTIONS 1-9: 3
SUM OF ALL RESPONSES TO PHQ QUESTIONS 1-9: 3

## 2019-12-16 NOTE — NURSING NOTE
Reason For Visit:   Chief Complaint   Patient presents with     Surgical Followup     DOS 9/27/19 S/P Right sternoclavicular joint reconstruction        PCP: Sebastien Guerrero     ?no  Occupation Not working  Date of injury: None  Date of surgery: None  Smoker: None     Right hand dominant         SANE score  Affected shoulder: Right   Right shoulder SANE: 60  Left shoulder SANE: 100      Pain Assessment  Patient Currently in Pain: Tisha Soto LPN

## 2019-12-16 NOTE — PROGRESS NOTES
CHIEF COMPLAINT:  Right shoulder status post sternoclavicular joint reconstruction.      DATE OF SURGERY:  09/27/2019.      HISTORY OF PRESENT ILLNESS:  Ivan returns today for followup.  He is seen today with his mother.  He has done well.  He has a twinge of discomfort when he raises the arm above shoulder height.      Overall, he feels like he is improving.      PHYSICAL EXAMINATION:  On exam today, he has 145 degrees of forward elevation and 40 degrees of external rotation at the side.  His incision is clean, dry and intact.        IMAGING: Two sets of sternoclavicular joint radiographs including a serendipity view demonstrates no evidence of fracture, dislocation or abnormal calcific focus.  The anterior to posterior tunnel was noted on 1 of the views.      ASSESSMENT:  Status post above procedure, doing well.      PLAN:  I had a nice talk with Mr. Guzman today.  I do not think he is having any evidence of ongoing instability.      Progression through the third phase of physical therapy which is strengthening.  He will see me back at the anniversary of his surgery or sooner should any additional problems arise.  Radiographs are only necessary at his anniversary visit if he is having issues.

## 2019-12-16 NOTE — LETTER
12/16/2019       RE: Ivan Guzman  [address]   [facility] [address]     Dear Colleague,    Thank you for referring your patient, Ivan Guzman, to the [facility] ORTHOPAEDIC [facility]INIC at [facility]. Please see a copy of my visit note below.    CHIEF COMPLAINT:  Right shoulder status post sternoclavicular joint reconstruction.      DATE OF SURGERY:  09/27/2019.      HISTORY OF PRESENT ILLNESS:  Ivan returns today for followup.  He is seen today with his mother.  He has done well.  He has a twinge of discomfort when he raises the arm above shoulder height.      Overall, he feels like he is improving.      PHYSICAL EXAMINATION:  On exam today, he has 145 degrees of forward elevation and 40 degrees of external rotation at the side.  His incision is clean, dry and intact.        IMAGING: Two sets of sternoclavicular joint radiographs including a serendipity view demonstrates no evidence of fracture, dislocation or abnormal calcific focus.  The anterior to posterior tunnel was noted on 1 of the views.      ASSESSMENT:  Status post above procedure, doing well.      PLAN:  I had a nice talk with Mr. Guzman today.  I do not think he is having any evidence of ongoing instability.      Progression through the third phase of physical therapy which is strengthening.  He will see me back at the anniversary of his surgery or sooner should any additional problems arise.  Radiographs are only necessary at his anniversary visit if he is having issues.     Luca Crane MD

## 2019-12-17 ASSESSMENT — PATIENT HEALTH QUESTIONNAIRE - PHQ9: SUM OF ALL RESPONSES TO PHQ QUESTIONS 1-9: 3

## 2020-01-09 ENCOUNTER — HOSPITAL ENCOUNTER (OUTPATIENT)
Dept: PHYSICAL THERAPY | Facility: CLINIC | Age: 20
Setting detail: THERAPIES SERIES
End: 2020-01-09
Attending: ORTHOPAEDIC SURGERY
Payer: COMMERCIAL

## 2020-01-09 DIAGNOSIS — M25.511 STERNOCLAVICULAR JOINT PAIN, RIGHT: ICD-10-CM

## 2020-01-09 PROCEDURE — 97161 PT EVAL LOW COMPLEX 20 MIN: CPT | Mod: GP | Performed by: PHYSICAL THERAPIST

## 2020-01-09 PROCEDURE — 97110 THERAPEUTIC EXERCISES: CPT | Mod: GP | Performed by: PHYSICAL THERAPIST

## 2020-01-09 NOTE — PROGRESS NOTES
Ivan Kellysereneny  2000 Physical Therapy Initial Evaluation  01/09/20 1400   General Information   Type of Visit Initial OP Ortho PT Evaluation   Start of Care Date 01/09/20   Referring Physician Luca Crane MD   Patient/Family Goals Statement Lift without pain   Orders Evaluate and Treat   Orders Comment Unrestricted 4-quadrant stretching, periscapular stabilization and strengthening   Date of Order 12/06/19   Certification Required? No   Medical Diagnosis Sternoclavicular joint pain, right   Surgical/Medical history reviewed Yes   Precautions/Limitations no known precautions/limitations   Body Part(s)   Body Part(s) Shoulder   Presentation and Etiology   Pertinent history of current problem (include personal factors and/or comorbidities that impact the POC) Had surgery on 9/27/2019. Not having a lot of pain right now. A little trouble reaching overhead, not too bad though. Lifting anything is difficult. Falling asleep has been a little difficult. / Comorbidities - Depression, Anxiety    Impairments A. Pain;B. Decreased WB tolerance;D. Decreased ROM;E. Decreased flexibility;F. Decreased strength and endurance   Functional Limitations perform activities of daily living;perform desired leisure / sports activities   Symptom Location Right SC joint / Right shoulder   How/Where did it occur Other  (Surgery)   Onset date of current episode/exacerbation 09/27/19  (Date of Surgery)   Chronicity Chronic   Pain rating (0-10 point scale) Worst (/10);Best (/10)   Best (/10) 0   Worst (/10) 23   Pain quality B. Dull;C. Aching   Frequency of pain/symptoms B. Intermittent   Pain/symptoms exacerbated by C. Lifting;D. Carrying;E. Rest;G. Certain positions;H. Overhead reach;K. Home tasks   Pain/symptoms eased by A. Sitting;F. Certain positions   Progression of symptoms since onset: Improved   Current / Previous Interventions   Diagnostic Tests: X-ray   X-ray Results unremarkable   Prior Level of Function   Functional Level  Prior Comment Independent   Current Level of Function   Patient role/employment history E. Unemployed   Fall Risk Screen   Fall screen completed by PT   Have you fallen 2 or more times in the past year? No   Have you fallen and had an injury in the past year? No   Is patient a fall risk? No   Abuse Screen (yes response referral indicated)   Feels Unsafe at Home or Work/School no   Feels Threatened by Someone no   Does Anyone Try to Keep You From Having Contact with Others or Doing Things Outside Your Home? no   Physical Signs of Abuse Present no   Shoulder Objective Findings   Side (if bilateral, select both right and left) Right   Integumentary  Surgical scar at right SC joint has healed nicely with no signs of infection noted   Posture Forward head and shoulders   Cervical Screen (ROM, quadrant) Full and symmetrical   Scapulothoracic Rhythm No deficits noted   Pec Minor (supine) Flexibility Moderately restricted B   Right Shoulder Flexion AROM 140 / Left - 157   Right Shoulder Abduction AROM 134 / Left - 173   Right Shoulder ER PROM 74 / Left - 97   Right Shoulder IR AROM T7 / Left - T6   Right Shoulder Flexion Strength <3/5 due to ROM deficits   Right Shoulder Abduction Strength <3/5 due to ROM deficits   Right Shoulder ER Strength <3/5 due to ROM deficits   Right Shoulder IR Strength 5/5 B   Planned Therapy Interventions   Planned Therapy Interventions joint mobilization;manual therapy;neuromuscular re-education;ROM;strengthening;stretching   Planned Modality Interventions   Planned Modality Interventions Hot packs;Cryotherapy;Electrical stimulation;TENS   Clinical Impression   Criteria for Skilled Therapeutic Interventions Met yes, treatment indicated   PT Diagnosis Sternoclavicular joint reconstruction with resultant shoulder ROM and strength deficits   Influenced by the following impairments Pain, Strength and ROM deficits   Functional limitations due to impairments Difficulty sleeping, reaching overhead,  lifting   Clinical Presentation Stable/Uncomplicated   Clinical Presentation Rationale 2 comorbidities impacting PT / 1 body system / Stable   Clinical Decision Making (Complexity) Low complexity   Therapy Frequency 2 times/Week   Predicted Duration of Therapy Intervention (days/wks) 6 weeks   Risk & Benefits of therapy have been explained Yes   Patient, Family & other staff in agreement with plan of care Yes   Education Assessment   Preferred Learning Style Listening;Reading;Demonstration;Pictures/video   Barriers to Learning No barriers   ORTHO GOALS   PT Ortho Eval Goals 1;2;3;4   Ortho Goal 1   Goal Identifier HEP   Goal Description Pt will be independent in HEP in order to achieve and maintain long term treatment goals.   Target Date 02/09/20   Ortho Goal 2   Goal Identifier Falling asleep   Goal Description Pt will be able to fall asleep without having to change positions due to shoulder pain.   Target Date 02/20/20   Ortho Goal 3   Goal Identifier Overhead reach   Goal Description Pt will have ROM within 5 degrees of his uninvolved UE for flexion in order to reach overhead.   Target Date 02/20/20   Ortho Goal 4   Goal Identifier Lifting   Goal Description Pt will be able to put dishes away with a minimal increase in discomfort 1-2/10.   Target Date 02/20/20   Total Evaluation Time   PT Eval, Low Complexity Minutes (05184) 15     Arvind Leahy, PT, DPT

## 2020-01-13 ENCOUNTER — HOSPITAL ENCOUNTER (OUTPATIENT)
Dept: PHYSICAL THERAPY | Facility: CLINIC | Age: 20
Setting detail: THERAPIES SERIES
End: 2020-01-13
Attending: ORTHOPAEDIC SURGERY
Payer: COMMERCIAL

## 2020-01-13 PROCEDURE — 97110 THERAPEUTIC EXERCISES: CPT | Mod: GP | Performed by: PHYSICAL THERAPIST

## 2020-01-20 ENCOUNTER — HOSPITAL ENCOUNTER (OUTPATIENT)
Dept: PHYSICAL THERAPY | Facility: CLINIC | Age: 20
Setting detail: THERAPIES SERIES
End: 2020-01-20
Attending: ORTHOPAEDIC SURGERY
Payer: COMMERCIAL

## 2020-01-20 PROCEDURE — 97110 THERAPEUTIC EXERCISES: CPT | Mod: GP | Performed by: PHYSICAL THERAPIST

## 2020-01-22 ENCOUNTER — HOSPITAL ENCOUNTER (OUTPATIENT)
Dept: PHYSICAL THERAPY | Facility: CLINIC | Age: 20
Setting detail: THERAPIES SERIES
End: 2020-01-22
Attending: ORTHOPAEDIC SURGERY
Payer: COMMERCIAL

## 2020-01-22 PROCEDURE — 97110 THERAPEUTIC EXERCISES: CPT | Mod: GP | Performed by: PHYSICAL THERAPIST

## 2020-01-29 ENCOUNTER — HOSPITAL ENCOUNTER (OUTPATIENT)
Dept: PHYSICAL THERAPY | Facility: CLINIC | Age: 20
Setting detail: THERAPIES SERIES
End: 2020-01-29
Attending: ORTHOPAEDIC SURGERY
Payer: COMMERCIAL

## 2020-01-29 PROCEDURE — 97110 THERAPEUTIC EXERCISES: CPT | Mod: GP | Performed by: PHYSICAL THERAPIST

## 2020-02-12 ENCOUNTER — HOSPITAL ENCOUNTER (OUTPATIENT)
Dept: PHYSICAL THERAPY | Facility: CLINIC | Age: 20
Setting detail: THERAPIES SERIES
End: 2020-02-12
Attending: ORTHOPAEDIC SURGERY
Payer: COMMERCIAL

## 2020-02-12 PROCEDURE — 97110 THERAPEUTIC EXERCISES: CPT | Mod: GP | Performed by: PHYSICAL THERAPIST

## 2020-02-12 NOTE — PROGRESS NOTES
Ivan Darbyny  2000  Diagnosis - Sternoclavicular joint pain right Physical Therapy Discharge Note  02/12/20 1000   Signing Clinician's Name / Credentials   Signing clinician's name / credentials Arvind Leahy, PT, DPT   Session Number   Session Number 6 (Start of Care Date - 1/9/2020)   Progress Note/Recertification   Progress Note Due Date 02/20/20   Progress Note Completed Date 02/12/20   Adult Goals   PT Ortho Eval Goals 1;2;3;4   Ortho Goal 1   Goal Identifier HEP   Goal Description Pt will be independent in HEP in order to achieve and maintain long term treatment goals.   Target Date 02/09/20   Date Met 01/22/20   Ortho Goal 2   Goal Identifier Falling asleep   Goal Description Pt will be able to fall asleep without having to change positions due to shoulder pain.   Target Date 02/20/20   Date Met 01/29/20   Ortho Goal 3   Goal Identifier Overhead reach   Goal Description Pt will have ROM within 5 degrees of his uninvolved UE for flexion in order to reach overhead.   Target Date 02/20/20   Date Met 02/12/20   Ortho Goal 4   Goal Identifier Lifting   Goal Description Pt will be able to put dishes away with a minimal increase in discomfort 1-2/10.   Target Date 02/20/20   Date Met 01/29/20   Subjective Report   Subjective Report No issues over the last 2 weeks. No issues with daily activities. HEP going well, no pain.   Objective Measures   Objective Measures Objective Measure 1;Objective Measure 2;Objective Measure 3;Objective Measure 4   Objective Measure 1   Objective Measure AROM   Details Flexion - 151 / Abduction - 164 / IR - T6   Objective Measure 2   Objective Measure PROM   Details ER - 96   Objective Measure 3   Objective Measure AROM uninvolved   Details Flexion - 151 / Abduction - 169 / IR - T7 / ER - 97   Objective Measure 4   Objective Measure Strength   Details Flexion - 5/5 B / Abdution - 5/5 B / ER - 5/5 B / IR - 5/5 B   Treatment Interventions   Interventions Therapeutic  Procedure/Exercise;Manual Therapy   Therapeutic Procedure/exercise   Therapeutic Procedures: strength, endurance, ROM, flexibillity minutes (37962) 28   Skilled Intervention Stretching and strengthening exercise education   Patient Response Improved ROM   Treatment Detail UBE at level 2.5 x4 minutes / PNF for D2 x15 with RTB / Band on wall x10 with RTB / Scaption 2x15 with 3# / IR in standing x15 with gray TB and x15 with Double Blue TB / ER in SL x15 with 3# and x15 with 2#   Education   Learner Patient   Readiness Acceptance   Method Booklet/handout;Explanation;Demonstration   Response Verbalizes Understanding;Demonstrates Understanding   Plan   Home program SL abduction / PNF for D2 / Resisted IR in standing / Rows / Band on wall / Scaption / Wall walks for flexion x10 with 5 second holds / AAROM for flexion in supine with cane x10 with 10 second holds / Rows x15 with gray TB / AAROM ER in doorway x10 with 10 second holds / ER in sidelying x15 with 2#   Updates to plan of care Discharged   Comments   Comments Pt has done well in physical therapy and has met all goals. Pt's ROM is excellent. Pt has no pain and does not have issues performing functional tasks. Pt will be discharged to [facility] at this time and is in agreement with this plan.    Total Session Time   Timed Code Treatment Minutes 28   Total Treatment Time (sum of timed and untimed services) 28   AMBULATORY CLINICS ONLY-MEDICAL AND TREATMENT DIAGNOSIS   PT Diagnosis Sternoclavicular joint reconstruction with resultant shoulder ROM and strength deficits     Referring Physician - Luca Crane

## 2020-03-02 ENCOUNTER — OFFICE VISIT (OUTPATIENT)
Dept: ORTHOPEDICS | Facility: CLINIC | Age: 20
End: 2020-03-02
Payer: COMMERCIAL

## 2020-03-02 DIAGNOSIS — Z53.9 ERRONEOUS ENCOUNTER--DISREGARD: Primary | ICD-10-CM

## 2020-03-02 NOTE — LETTER
3/2/2020       RE: Ivan Guzman  [address]   [facility] [address]     Dear Colleague,    Thank you for referring your patient, Ivan Guzman, to the [facility] ORTHOPAEDIC CLINIC at [facility]. Please see a copy of my visit note below.    Patient did not need to see provider.    This encounter was opened in error. Please disregard.    Again, thank you for allowing me to participate in the care of your patient.      Sincerely,    Luca Crane MD

## 2020-03-02 NOTE — NURSING NOTE
Reason For Visit:   Chief Complaint   Patient presents with     RECHECK     F/U Right shoulder. No new problems, would like to go back to work       PCP: Sebastien Guerrero     ?no  Occupation Not working  Date of injury: None  Date of surgery: None  Smoker: None     Right hand dominant    SANE score  Affected shoulder: Right  Right shoulder SANE: 60  Left shoulder SANE: 100        Pain Assessment  Patient Currently in Pain: Tisha Soto LPN

## 2020-06-12 ENCOUNTER — TELEPHONE (OUTPATIENT)
Dept: ORTHOPEDICS | Facility: CLINIC | Age: 20
End: 2020-06-12

## 2020-06-12 NOTE — TELEPHONE ENCOUNTER
Attempted to call pt back. Phone stopped ringing and  Hung up.       Will have Dr. Crane review on Monday

## 2020-06-12 NOTE — LETTER
Return to Work  Mckenna 15, 2020     Seen today: no    Patient:  Ivan Guzman  :   2000  MRN:     [id number]  Physician: JESSICA CRANE    Ivan Guzman may return to work on Date: 20 with the only restriction of no lifting over 55 pounds.    Please call the clinic with any questions.      Electronically signed by Jessica Crane MD

## 2020-06-12 NOTE — TELEPHONE ENCOUNTER
[facility] Call Center    Phone Message    May a detailed message be left on voicemail: yes     Reason for Call: Other:   Pt requesting for an updated weight restriction note from Rosa [facility]. Pt states that the update should be at 55lbs. Please mail to pt's address on file.      Action Taken: Other:  ortho    Travel Screening: Not Applicable

## 2020-06-15 NOTE — TELEPHONE ENCOUNTER
Dr. Crane was consulted and he agreed with the new weight restriction.  Letter was written and mailed to home as requested.

## 2020-11-28 ENCOUNTER — VIRTUAL VISIT (OUTPATIENT)
Dept: FAMILY MEDICINE | Facility: OTHER | Age: 20
End: 2020-11-28
Payer: COMMERCIAL

## 2020-11-28 PROCEDURE — 99421 OL DIG E/M SVC 5-10 MIN: CPT | Performed by: INTERNAL MEDICINE

## 2020-11-28 NOTE — PROGRESS NOTES
"Date: 2020 14:56:21  Clinician: Vivien Porter  Clinician NPI: [id number]  Patient: Ivan Guzman  Patient : 2000  Patient Address: [address] [place], [place] [address] [address]  Patient Phone: [phone]  Visit Protocol: URI  Patient Summary:  Ivan is a 20 year old ( : 2000 ) male who initiated a [facility] Visit for COVID-19 (Coronavirus) evaluation and screening. When asked the question \"Please sign me up to receive news, health information and promotions from [facility].\", Ivan responded \"No\".    Ivan states his symptoms started 1-2 days ago.   His symptoms consist of a cough, nasal congestion, nausea, myalgia, chills, malaise, and a sore throat. He is experiencing mild difficulty breathing with activities but can speak normally in full sentences. Ivan also feels feverish but was unable to measure his temperature.   Symptom details     Nasal secretions: The color of his mucus is clear.    Cough: Ivan coughs a few times an hour and his cough is not more bothersome at night. Phlegm comes into his throat when he coughs. He does not believe his cough is caused by post-nasal drip. The color of the phlegm is clear.     Sore throat: Ivan reports having mild throat pain (1-3 on a 10 point pain scale), does not have exudate on his tonsils, and can swallow liquids. The lymph nodes in his neck are not enlarged. A rash has not appeared on the skin since the sore throat started.      Ivan denies having ear pain, headache, wheezing, enlarged lymph nodes, vomiting, rhinitis, facial pain or pressure, teeth pain, ageusia, diarrhea, and anosmia. He also denies taking antibiotic medication in the past month and having recent facial or sinus surgery in the past 60 days.   Precipitating events  Ivan is not sure if he has been exposed to someone with strep throat. He has not recently been exposed to someone with influenza. Ivan has not been in close contact with any high risk individuals.   Pertinent COVID-19 " (Coronavirus) information  Ivan does not work or volunteer as healthcare worker or a . In the past 14 days, Ivan has not worked or volunteered at a healthcare facility or group living setting.   In the past 14 days, he also has not lived in a congregate living setting.   Ivan has not had a close contact with a laboratory-confirmed COVID-19 patient within 14 days of symptom onset.    Since December 2019, Ivan has not been tested for COVID-19 and has not had upper respiratory infection or influenza-like illness.   Pertinent medical history  He has not been told by his provider to avoid NSAIDs.   Ivan does not have diabetes. He denies having immunosuppressive conditions (e.g., chemotherapy, HIV, organ transplant, long-term use of steroids or other immunosuppressive medications, splenectomy). He does not have severe COPD and congestive heart failure. He does not have asthma.   Ivan needs a return to work/school note.   Weight: 170 lbs   Ivan smokes or uses smokeless tobacco.   Weight: 170 lbs    MEDICATIONS: No current medications, ALLERGIES: NKDA  Clinician Response:  Dear Ivan,   Your symptoms show that you may have coronavirus (COVID-19). This illness can cause fever, cough and trouble breathing. Many people get a mild case and get better on their own. Some people can get very sick.  Based on the symptoms you have shared, I would like you to be re-checked in 2 to 3 days. Please call your family clinic to set up a video or phone visit.  Will I be tested for COVID-19?  We would like to test you for this virus.   Please call [phone] to schedule your visit. Explain that you were [facility] have [facility]-19 test. Be ready to share your [facility] visit ID number.   * If you need to schedule in [place] or [facility] please call [phone] or for [employer] employees please call [phone].    The following will serve as your written order for this COVID Test, ordered by me, for the  "indication of suspected COVID [Z20.828]: The test will be ordered in [employer], our electronic health record, after you are scheduled. It will show as ordered and authorized by José Miguel Coleman MD.  Order: COVID-19 (Coronavirus) PCR for SYMPTOMATIC testing from [facility].   1.When it's time for your COVID test:   Stay at least 6 feet away from others. (If someone will drive you to your test, stay in the backseat, as far away from the  as you can.)   Cover your mouth and nose with a mask, tissue or washcloth.  Go straight to the testing site. Don't make any stops on the way there or back.      2.Starting now: Stay home and away from others (self-isolate) until:   You've had no fever---and no medicine that reduces fever---for one full day (24 hours). And...   Your other symptoms have gotten better. For example, your cough or breathing has improved. And...   At least 10 days have passed since your symptoms started.       During this time, don't leave the house except for testing or medical care.   Stay in your own room, even for meals. Use your own bathroom if you can.   Stay away from others in your home. No hugging, kissing or shaking hands. No visitors.  Don't go to work, school or anywhere else.    Clean \"high touch\" surfaces often (doorknobs, counters, handles, etc.). Use a household cleaning spray or wipes. You'll find a full list of  on the EPA website: www.epa.gov/pesticide-registration/list-n-disinfectants-use-against-sars-cov-2.   Cover your mouth and nose with a mask, tissue or washcloth to avoid spreading germs.  Wash your hands and face often. Use soap and water.  Caregivers in these groups are at risk for severe illness due to COVID-19:  o People 65 years and older  o People who live in a nursing home or long-term care facility  o People with chronic disease (lung, heart, cancer, diabetes, kidney, liver, immunologic)   o People who have a weakened immune system, including those who:   Are in cancer " treatment  Take medicine that weakens the immune system, such as corticosteroids  Had a bone marrow or organ transplant  Have an immune deficiency  Have poorly controlled HIV or AIDS  Are obese (body mass index of 40 or higher)  Smoke regularly   o Caregivers should wear gloves while washing dishes, handling laundry and cleaning bedrooms and bathrooms.  o Use caution when washing and drying laundry: Don't shake dirty laundry, and use the warmest water setting that you can.  o For more tips, go to www.cdc.gov/coronavirus/2019-ncov/downloads/10Things.pdf.      How can I take care of myself?   Get lots of rest. Drink extra fluids (unless a doctor has told you not to)   Take Tylenol (acetaminophen) for fever or pain. If you have liver or kidney problems, ask your family doctor if it's okay to take Tylenol.   Adults can take either:    650 mg (two 325 mg pills) every 4 to 6 hours, or...   1,000 mg (two 500 mg pills) every 8 hours as needed.    Note: Don't take more than 3,000 mg in one day. Acetaminophen is found in many medicines (both prescribed and over-the-counter medicines). Read all labels to be sure you don't take too much.   For children, check the Tylenol bottle for the right dose. The dose is based on the child's age or weight.    If you have other health problems (like cancer, heart failure, an organ transplant or severe kidney disease): Call your specialty clinic if you don't feel better in the next 2 days.       Know when to call 911. Emergency warning signs include:    Trouble breathing or shortness of breath Pain or pressure in the chest that doesn't go away Feeling confused like you haven't felt before, or not being able to wake up Bluish-colored lips or face  Where can I get more information?    [facility] -- About COVID-19: [email or web address]   CDC -- What to Do If You're Sick: www.cdc.gov/coronavirus/2019-ncov/about/steps-when-sick.html   CDC -- Ending Home Isolation:  www.cdc.gov/coronavirus/2019-ncov/hcp/disposition-in-home-patients.html   CDC -- Caring for Someone: www.cdc.gov/coronavirus/2019-ncov/if-you-are-sick/care-for-someone.html   [facility] -- Interim Guidance for Hospital Discharge to Home: www.health.[facility].[place].us/diseases/coronavirus/hcp/hospdischarge.pdf   [facility] clinical trials (COVID-19 research studies): clinicalaffairs.[facility]-clinical-trials    Below are the COVID-19 hotlines at the [place] Department of Health ([facility]). Interpreters are available.    For health questions: Call 863-701-5048 or 1-158.256.6827 (7 a.m. to 7 p.m.) For questions about schools and childcare: Call 975-982-3669 or 1-617.985.9123 (7 a.m. to 7 p.m.)       COVID-19 (Coronavirus) General Information  Because there is currently no vaccine to prevent infection, the best way to protect yourself is to avoid being exposed to this virus. Common symptoms of COVID-19 include but are not limited to fever, cough, and shortness of breath. These symptoms appear 2-14 days after you are exposed to the virus that causes COVID-19. Click here for more information from the CDC on how to protect yourself.  If you are sick with COVID-19 or suspect you are infected with the virus that causes COVID-19, follow the steps here from the CDC to help prevent the disease from spreading to people in your home and community.  Click here for general information from the CDC on testing.  If you develop any of these emergency warning signs for COVID-19, get medical attention immediately:     Trouble breathing    Persistent pain or pressure in the chest    New confusion or inability to arouse    Bluish lips or face      Call your doctor or clinic before going in. Call [phone] if you have a medical emergency and notify the  you have or think you may have COVID-19.  For more detailed and up to date information on COVID-19 (Coronavirus), please visit the CDC website.   Diagnosis: Contact with and (suspected)  exposure to other viral communicable diseases  Diagnosis ICD: Z20.828

## 2020-11-29 DIAGNOSIS — Z20.822 ENCOUNTER FOR LABORATORY TESTING FOR COVID-19 VIRUS: Primary | ICD-10-CM

## 2020-11-29 PROCEDURE — U0003 INFECTIOUS AGENT DETECTION BY NUCLEIC ACID (DNA OR RNA); SEVERE ACUTE RESPIRATORY SYNDROME CORONAVIRUS 2 (SARS-COV-2) (CORONAVIRUS DISEASE [COVID-19]), AMPLIFIED PROBE TECHNIQUE, MAKING USE OF HIGH THROUGHPUT TECHNOLOGIES AS DESCRIBED BY CMS-2020-01-R: HCPCS | Performed by: FAMILY MEDICINE

## 2020-11-30 LAB
SARS-COV-2 RNA SPEC QL NAA+PROBE: ABNORMAL
SPECIMEN SOURCE: ABNORMAL

## 2020-12-01 ENCOUNTER — TELEPHONE (OUTPATIENT)
Dept: EMERGENCY MEDICINE | Facility: CLINIC | Age: 20
End: 2020-12-01

## 2020-12-01 NOTE — TELEPHONE ENCOUNTER
"Coronavirus (COVID-19) Notification    Caller Name (Patient, parent, daughter/son, grandparent, etc)  Patient     Reason for call  Notify of Positive Coronavirus (COVID-19) lab results, assess symptoms,  review [facility] recommendations    Lab Result    Lab test:  2019-nCoV rRt-PCR or SARS-CoV-2 PCR    Oropharyngeal AND/OR nasopharyngeal swabs is POSITIVE for 2019-nCoV RNA/SARS-COV-2 PCR (COVID-19 virus)    RN Recommendations/Instructions per [facility] Coronavirus COVID-19 recommendations    Brief introduction script  Introduce self then review script:  \"I am calling on behalf of [employer].  We were notified that your Coronavirus test (COVID-19) for was POSITIVE for the virus.  I have some information to relay to you but first I wanted to mention that the [place] Dept of Health will be contacting you shortly [it's possible MD already called Patient] to talk to you more about how you are feeling and other people you have had contact with who might now also have the virus.  Also, [facility] is Partnering with the [facility] for Covid-19 research, you may be contacted directly by research staff.\"    Assessment (Inquire about Patient's current symptoms)   Assessment   Current Symptoms at time of phone call: (if no symptoms, document No symptoms]  cough, fever, SOB with activity    Symptoms onset (if applicable)  11/27/20     If at time of call, Patients symptoms hare worsened, the Patient should contact 911 or have someone drive them to Emergency Dept promptly:      If Patient calling 911, inform 911 personal that you have tested positive for the Coronavirus (COVID-19).  Place mask on and await 911 to arrive.    If Emergency Dept, If possible, please have another adult drive you to the Emergency Dept but you need to wear mask when in contact with other people.      Review information with Patient    Your result was positive. This means you have COVID-19 (coronavirus).  We have sent you a " letter that reviews the information that I'll be reviewing with you now.    How can I protect others?    If you have symptoms: stay home and away from others (self-isolate) until:    You've had no fever--and no medicine that reduces fever--for 1 full day (24 hours). And       Your other symptoms have gotten better. For example, your cough or breathing has improved. And     At least 10 days have passed since your symptoms started. (If you've been told by a doctor that you have a weak immune system, wait 20 days.)     If you don't have symptoms: Stay home and away from others (self-isolate) until at least 10 days have passed since your first positive COVID-19 test. (Date test collected)    During this time:    Stay in your own room, including for meals. Use your own bathroom if you can.    Stay away from others in your home. No hugging, kissing or shaking hands. No visitors.     Don't go to work, school or anywhere else.     Clean  high touch  surfaces often (doorknobs, counters, handles, etc.). Use a household cleaning spray or wipes. You'll find a full list on the EPA website at www.epa.gov/pesticide-registration/list-n-disinfectants-use-against-sars-cov-2.     Cover your mouth and nose with a mask, tissue or other face covering to avoid spreading germs.    Wash your hands and face often with soap and water.    Caregivers in these groups are at risk for severe illness due to COVID-19:  o People 65 years and older  o People who live in a nursing home or long-term care facility  o People with chronic disease (lung, heart, cancer, diabetes, kidney, liver, immunologic)  o People who have a weakened immune system, including those who:  - Are in cancer treatment  - Take medicine that weakens the immune system, such as corticosteroids  - Had a bone marrow or organ transplant  - Have an immune deficiency  - Have poorly controlled HIV or AIDS  - Are obese (body mass index of 40 or higher)  - Smoke regularly    Caregivers  should wear gloves while washing dishes, handling laundry and cleaning bedrooms and bathrooms.    Wash and dry laundry with special caution. Don't shake dirty laundry, and use the warmest water setting you can.    If you have a weakened immune system, ask your doctor about other actions you should take.    For more tips, go to www.cdc.gov/coronavirus/2019-ncov/downloads/10Things.pdf.    You should not go back to work until you meet the guidelines above for ending your home isolation. You don't need to be retested for COVID-19 before going back to work--studies show that you won't spread the virus if it's been at least 10 days since your symptoms started (or 20 days, if you have a weak immune system).    Employers: This document serves as formal notice of your employee's medical guidelines for going back to work. They must meet the above guidelines before going back to work in person.    How can I take care of myself?    1. Get lots of rest. Drink extra fluids (unless a doctor has told you not to).    2. Take Tylenol (acetaminophen) for fever or pain. If you have liver or kidney problems, ask your family doctor if it's okay to take Tylenol.     Take either:     650 mg (two 325 mg pills) every 4 to 6 hours, or     1,000 mg (two 500 mg pills) every 8 hours as needed.     Note: Don't take more than 3,000 mg in one day. Acetaminophen is found in many medicines (both prescribed and over-the-counter medicines). Read all labels to be sure you don't take too much.    For children, check the Tylenol bottle for the right dose (based on their age or weight).    3. If you have other health problems (like cancer, heart failure, an organ transplant or severe kidney disease): Call your specialty clinic if you don't feel better in the next 2 days.    4. Know when to call 911: Emergency warning signs include:    Trouble breathing or shortness of breath    Pain or pressure in the chest that doesn't go away    Feeling confused like you  haven't felt before, or not being able to wake up    Bluish-colored lips or face    5. Sign up for [employer]. We know it's scary to hear that you have COVID-19. We want to track your symptoms to make sure you're okay over the next 2 weeks. Please look for an email from [employer]--this is a free, online program that we'll use to keep in touch. To sign up, follow the link in the email. Learn more at [email or web address].    Where can I get more information?    [facility]: [email or web address]    Coronavirus Basics: www.health.[email or web address].[place]./diseases/coronavirus/basics.html    What to Do If You're Sick: www.cdc.gov/coronavirus/2019-ncov/about/steps-when-sick.html    Ending Home Isolation: www.cdc.gov/coronavirus/2019-ncov/hcp/disposition-in-home-patients.html     Caring for Someone with COVID-19: www.cdc.gov/coronavirus/2019-ncov/if-you-are-sick/care-for-someone.html     [facility] clinical trials (COVID-19 research studies): [email or web address]-clinical-trials     A Positive COVID-19 letter will be sent via [employer] or the mail. (Exception, no letters sent to Presurgerical/Preprocedure Patients)    Tamela Diaz RN

## 2021-12-27 DIAGNOSIS — M25.511 PAIN OF RIGHT STERNOCLAVICULAR JOINT: Primary | ICD-10-CM

## 2021-12-31 ENCOUNTER — TELEPHONE (OUTPATIENT)
Dept: ORTHOPEDICS | Facility: CLINIC | Age: 21
End: 2021-12-31
Payer: COMMERCIAL

## 2022-01-03 ENCOUNTER — ANCILLARY PROCEDURE (OUTPATIENT)
Dept: GENERAL RADIOLOGY | Facility: CLINIC | Age: 22
End: 2022-01-03
Attending: ORTHOPAEDIC SURGERY
Payer: COMMERCIAL

## 2022-01-03 ENCOUNTER — TELEPHONE (OUTPATIENT)
Dept: ORTHOPEDICS | Facility: CLINIC | Age: 22
End: 2022-01-03

## 2022-01-03 ENCOUNTER — OFFICE VISIT (OUTPATIENT)
Dept: ORTHOPEDICS | Facility: CLINIC | Age: 22
End: 2022-01-03
Payer: COMMERCIAL

## 2022-01-03 DIAGNOSIS — M25.511 PAIN OF RIGHT STERNOCLAVICULAR JOINT: ICD-10-CM

## 2022-01-03 DIAGNOSIS — M25.511 PAIN OF RIGHT STERNOCLAVICULAR JOINT: Primary | ICD-10-CM

## 2022-01-03 PROCEDURE — 73000 X-RAY EXAM OF COLLAR BONE: CPT | Mod: RT | Performed by: RADIOLOGY

## 2022-01-03 PROCEDURE — 99214 OFFICE O/P EST MOD 30 MIN: CPT | Mod: GC | Performed by: ORTHOPAEDIC SURGERY

## 2022-01-03 ASSESSMENT — PATIENT HEALTH QUESTIONNAIRE - PHQ9: SUM OF ALL RESPONSES TO PHQ QUESTIONS 1-9: 12

## 2022-01-03 NOTE — TELEPHONE ENCOUNTER
Health Call Center    Phone Message    May a detailed message be left on voicemail: yes     Reason for Call: Other: Patient's mom called to reschedule appt that was cancelled for this afternoon to see Dr. Crane.      Patient lives in [place] and was only in town for another week for the holidays.    Mom says patient is in a lot of pain and can't do anything.    Please reach out to Mom and let her know when patient could get in anytime this week, even if it's not with Dr. Crane as patient returns to [place] on 1/10/22.    Action Taken: Message routed to:  [facility] ortho    Travel Screening: Not Applicable

## 2022-01-03 NOTE — LETTER
1/3/2022         RE: Ivan Guzman  [id number] [address] [address]        Dear Colleague,    Thank you for referring your patient, Ivan Guzman, to the [facility] [place]. Please see a copy of my visit note below.    CHIEF CONCERN: Right recurrent sternoclavicular pain    DATE OF SURGERY: 9/27/19    HISTORY OF PRESENT ILLNESS:  Patient is a 21-year-old male who is now over 2 years status post right sternoclavicular joint reconstruction with allograft. The patient initially did very well after surgery, and was essentially symptom-free for almost 2 years, even able to participate in activities such as tennis. 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 This job aggravated his SC joint at the same location that he was having symptoms prior to his surgery. He quit the job after 1 month and tried to limit his shoulder motion and activity as much as possible to allow for healing, however his symptoms worsened over the next 4 months despite his self-imposed activity restriction. He has been taking up to 1000 g 3 times daily of Tylenol alternating with 800 mg 3 times daily of ibuprofen which partially helped his pain. He also finds heat helpful. He has not tried any injections or formal physical therapy yet. The activities he finds most bothersome now are reaching activities and lifting any significant weight and that arm. Rarely he finds that his pain radiates down the arm, but it is mostly quite localized to the SC joint.    PHYSICAL EXAM:  VITAL SIGNS: There were no vitals taken for this visit.  RESP: Non labored breathing  MUSCULOSKELETAL:      Neck: Active ROM full without pain    RUE:  Skin intact. No erythema. Prior scars include his SC reconstruction scar which is well-healed with no erythema or swelling. No standing shoulder asymmetry compared with contralateral shoulder. No  obvious asymmetric muscle atrophy. Quite focally tender on palpation over the SC joint. No significant tenderness over the AC joint, bicipital groove, or posterior glenohumeral joint. SILT in axillary, median, ulnar musculocutaneous, and radial nerve distributions. 5/5 , EPL, FPL, intrinsics. 4/5 strength in biceps, triceps, and deltoid (limited by pain)    Active motion is equal to passive motion and is FE to 120 with discomfort, Abduction to 100 with discomfort, ER at side to 50 with discomfort, IR to L5 with discomfort at end range. Symptoms reliably referred to the right SC joint with active and passive overhead motion, cross body adduction, and reaching abduction activity.    No clinically detectable SC joint instability with active or passive motion of the shoulder.      IMAGING:  X-rays of the clavicles taken today were reviewed. Sternoclavicular joint relationship on the right appears appropriate and similar to the left. No significant evidence of SC joint osteolysis or other findings suggestive of infection. No fractures seen.    ASSESSMENT:  21-year-old now over 2 years status post right SC joint reconstruction with allograft, presenting with 4 months of worsening pain localized to the previously reconstructed SC joint.    Given the patient's relatively long period of symptom relief following the surgery, and the clear exacerbating event of his highly repetitive new job, it would seem that the patient is suffering from an SC joint flare rather than simply experiencing a slow progression of baseline pain. Clinically, he does not appear to have signs or symptoms consistent with an infection. It would be unusual for his symptoms to be explained simply by overtensioning of the SC joint, given his relatively long period of symptom relief.      Unfortunately, we discussed that our options at this time are limited. We did discuss both operative and nonoperative options, including oral and topical pain  medications, activity modification and physical therapy, and injections into the sternoclavicular joint. We offered the patient a ultrasound or fluoroscopy guided SI joint injection to be scheduled for both diagnostic and therapeutic purposes. The patient wished to proceed with this injection. We told the patient that the best case scenario he would experience American relief from the injection and his symptoms would not return, but it was possible that his symptoms would only experience partial relief or for a finite duration of time, and our next steps would depend on how much relief he experienced from this injection and for how long. Surgery may be an option in the future depending on his relief, but we will have to see how he responds to his injection first. We will work with our coordinators on scheduling this injection for the patient and follow-up with him on an as-needed basis following this injection.    PLAN:  -Right sternoclavicular joint corticosteroid injection  -Patient was instructed to keep in touch with us following his response to the injection which will help determine our next steps in care.    Gabriel Cook MD  Orthopedic Surgery PGY-5  Pager: [phone]    Patient was seen and discussed with Dr. Crane        Attestation signed by Luca Crane MD at 1/17/2022  1:17 PM:  I saw the patient with the resident or fellow.  I agree with the resident or fellow note and plan of care.      Luca Crane MD

## 2022-01-03 NOTE — PROGRESS NOTES
CHIEF CONCERN: Right recurrent sternoclavicular pain    DATE OF SURGERY: 9/27/19    HISTORY OF PRESENT ILLNESS:  Patient is a 21-year-old male who is now over 2 years status post right sternoclavicular joint reconstruction with allograft. The patient initially did very well after surgery, and was essentially symptom-free for almost 2 years, even able to participate in activities such as tennis. 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 This job aggravated his SC joint at the same location that he was having symptoms prior to his surgery. He quit the job after 1 month and tried to limit his shoulder motion and activity as much as possible to allow for healing, however his symptoms worsened over the next 4 months despite his self-imposed activity restriction. He has been taking up to 1000 g 3 times daily of Tylenol alternating with 800 mg 3 times daily of ibuprofen which partially helped his pain. He also finds heat helpful. He has not tried any injections or formal physical therapy yet. The activities he finds most bothersome now are reaching activities and lifting any significant weight and that arm. Rarely he finds that his pain radiates down the arm, but it is mostly quite localized to the SC joint.    PHYSICAL EXAM:  VITAL SIGNS: There were no vitals taken for this visit.  RESP: Non labored breathing  MUSCULOSKELETAL:      Neck: Active ROM full without pain    RUE:  Skin intact. No erythema. Prior scars include his SC reconstruction scar which is well-healed with no erythema or swelling. No standing shoulder asymmetry compared with contralateral shoulder. No obvious asymmetric muscle atrophy. Quite focally tender on palpation over the SC joint. No significant tenderness over the AC joint, bicipital groove, or posterior glenohumeral joint. SILT in axillary, median, ulnar musculocutaneous, and radial nerve distributions.  5/5 , EPL, FPL, intrinsics. 4/5 strength in biceps, triceps, and deltoid (limited by pain)    Active motion is equal to passive motion and is FE to 120 with discomfort, Abduction to 100 with discomfort, ER at side to 50 with discomfort, IR to L5 with discomfort at end range. Symptoms reliably referred to the right SC joint with active and passive overhead motion, cross body adduction, and reaching abduction activity.    No clinically detectable SC joint instability with active or passive motion of the shoulder.      IMAGING:  X-rays of the clavicles taken today were reviewed. Sternoclavicular joint relationship on the right appears appropriate and similar to the left. No significant evidence of SC joint osteolysis or other findings suggestive of infection. No fractures seen.    ASSESSMENT:  21-year-old now over 2 years status post right SC joint reconstruction with allograft, presenting with 4 months of worsening pain localized to the previously reconstructed SC joint.    Given the patient's relatively long period of symptom relief following the surgery, and the clear exacerbating event of his highly repetitive new job, it would seem that the patient is suffering from an SC joint flare rather than simply experiencing a slow progression of baseline pain. Clinically, he does not appear to have signs or symptoms consistent with an infection. It would be unusual for his symptoms to be explained simply by overtensioning of the SC joint, given his relatively long period of symptom relief.      Unfortunately, we discussed that our options at this time are limited. We did discuss both operative and nonoperative options, including oral and topical pain medications, activity modification and physical therapy, and injections into the sternoclavicular joint. We offered the patient a ultrasound or fluoroscopy guided SI joint injection to be scheduled for both diagnostic and therapeutic purposes. The patient wished to proceed  with this injection. We told the patient that the best case scenario he would experience American relief from the injection and his symptoms would not return, but it was possible that his symptoms would only experience partial relief or for a finite duration of time, and our next steps would depend on how much relief he experienced from this injection and for how long. Surgery may be an option in the future depending on his relief, but we will have to see how he responds to his injection first. We will work with our coordinators on scheduling this injection for the patient and follow-up with him on an as-needed basis following this injection.    PLAN:  -Right sternoclavicular joint corticosteroid injection  -Patient was instructed to keep in touch with us following his response to the injection which will help determine our next steps in care.    Gabriel Cook MD  Orthopedic Surgery PGY-5  Pager: [phone]    Patient was seen and discussed with Dr. Crane

## 2022-01-03 NOTE — NURSING NOTE
Reason For Visit:   Chief Complaint   Patient presents with     Surgical Followup     DOS 9/27/19 Right Sternoclavicular Joint Reconstruction With Allograft       PCP: Sebastien Guerrero     ?no  Occupation Not working  Date of injury: None  Date of surgery: 9/27/2019  Type of surgery: Right shoulder sternoclavicular joint reconstruction with allograft semitendinosis.   Smoker: None     Right hand dominant    SANE score  Affected shoulder: Right  Right shoulder SANE: 25  Left shoulder SANE: 100    There were no vitals taken for this visit.      Pain Assessment  Patient Currently in Pain: Yes  0-10 Pain Scale: 6  Primary Pain Location: Sternum (Right SC joint)  Pain Descriptors: Sharp,Radiating  Alleviating Factors: Pain medication,Heat (Tylenol, Ibuprofen)  Aggravating Factors: Movement,Other (comment) (being in the wrong position)    Keeley Barry ATC

## 2022-01-03 NOTE — NURSING NOTE
Depression Response    Patient completed the PHQ-9 assessment for depression and scored >9? Yes  Question 9 on the PHQ-9 was positive for suicidality? No  Does patient have current mental health provider? No    Is this a virtual visit? No    I personally notified the following: clinic nurse

## 2022-01-04 ENCOUNTER — PRE VISIT (OUTPATIENT)
Dept: ORTHOPEDICS | Facility: CLINIC | Age: 22
End: 2022-01-04

## 2022-01-04 ENCOUNTER — OFFICE VISIT (OUTPATIENT)
Dept: ORTHOPEDICS | Facility: CLINIC | Age: 22
End: 2022-01-04
Payer: COMMERCIAL

## 2022-01-04 DIAGNOSIS — M25.511 STERNOCLAVICULAR JOINT PAIN, RIGHT: Primary | ICD-10-CM

## 2022-01-04 PROCEDURE — 20606 DRAIN/INJ JOINT/BURSA W/US: CPT | Mod: RT | Performed by: FAMILY MEDICINE

## 2022-01-04 PROCEDURE — 99207 PR DROP WITH A PROCEDURE: CPT | Performed by: FAMILY MEDICINE

## 2022-01-04 RX ORDER — LIDOCAINE HYDROCHLORIDE 10 MG/ML
1 INJECTION, SOLUTION EPIDURAL; INFILTRATION; INTRACAUDAL; PERINEURAL
Status: DISCONTINUED | OUTPATIENT
Start: 2022-01-04 | End: 2022-06-27

## 2022-01-04 RX ORDER — TRIAMCINOLONE ACETONIDE 40 MG/ML
40 INJECTION, SUSPENSION INTRA-ARTICULAR; INTRAMUSCULAR
Status: DISCONTINUED | OUTPATIENT
Start: 2022-01-04 | End: 2022-06-27

## 2022-01-04 RX ORDER — DICLOFENAC SODIUM 75 MG/1
75 TABLET, DELAYED RELEASE ORAL 2 TIMES DAILY
Qty: 28 TABLET | Refills: 1 | Status: SHIPPED | OUTPATIENT
Start: 2022-01-04 | End: 2022-02-07

## 2022-01-04 RX ADMIN — TRIAMCINOLONE ACETONIDE 40 MG: 40 INJECTION, SUSPENSION INTRA-ARTICULAR; INTRAMUSCULAR at 10:39

## 2022-01-04 RX ADMIN — LIDOCAINE HYDROCHLORIDE 1 ML: 10 INJECTION, SOLUTION EPIDURAL; INFILTRATION; INTRACAUDAL; PERINEURAL at 10:39

## 2022-01-04 NOTE — PROGRESS NOTES
[facility]  SPORTS & ORTHOPEDIC CLINIC VISIT     Jan 4, 2022        Ultrasound Guided Right Sternoclavicular Joint Injection    Date/Time: 1/4/2022 10:39 AM  Performed by: Carlos Moraes MD  Authorized by: Carlos Moraes MD     Indications:  Pain  Needle Size:  25 G  Guidance: ultrasound    Approach:  Anterior  Location:  Shoulder  Location comment:  R Sternoclavicular   Medications:  40 mg triamcinolone 40 MG/ML; 1 mL lidocaine (PF) 1 %  Outcome:  Tolerated well, no immediate complications  Procedure discussed: discussed risks, benefits, and alternatives    Consent Given by:  Patient  Timeout: timeout called immediately prior to procedure    Prep: patient was prepped and draped in usual sterile fashion     Patient was positioned lying supine on exam table.  The sternoclavicular joint was identified with ultrasound.  No significant effusion demonstrated on ultrasound exam. Minimal enhancement with PDI. The area was cleaned with a chlorhexidine swab.  Ultrasound guidance was necessary due to the postoperative changes to the joint as well as proximity to significant vasculature to ensure proper placement of the needle.  Using sterile technique with continuous ultrasound guidance a 25-gauge needle was advanced into the sternoclavicular joint with an in plane approach.  Next a solution of 40 mg triamcinolone and 1 mL 1% lidocaine was injected and seen flowing into the joint.  Images were captured and saved to the permanent record.  The patient tolerated the procedure well.  Routine postinjection instructions were given.  He was given a prescription for diclofenac to be used in place of the ibuprofen he is currently been taking as it had been ineffective.  He will call or message us in 2 weeks to let us know about his response to the injection.  Carlos Moraes MD

## 2022-01-04 NOTE — NURSING NOTE
Depression Response    Patient completed the PHQ-9 assessment for depression and scored >9? Yes  Question 9 on the PHQ-9 was positive for suicidality? No  Does patient have current mental health provider? No    Is this a virtual visit? No    I personally notified the following: visit provider and placed an order for adult mental health.    Alyssa Izaguirre RN on 1/4/2022 at 7:48 AM

## 2022-01-04 NOTE — NURSING NOTE
[address] [address]  Dept: [phone]  ______________________________________________________________________________    Patient: Ivan Guzman   : 2000   MRN: [id number]   2022    INVASIVE PROCEDURE SAFETY CHECKLIST    Date: 2022   Procedure: R Sternoclavicular Joint CSI USG   Patient Name: Ivan Guzman  MRN: [id number]  YOB: 2000    Action: Complete sections as appropriate. Any discrepancy results in a HARD COPY until resolved.     PRE PROCEDURE:  Patient ID verified with 2 identifiers (name and  or MRN): Yes  Procedure and site verified with patient/designee (when able): Yes  Accurate consent documentation in medical record: Yes  H&P (or appropriate assessment) documented in medical record: Yes  H&P must be up to 20 days prior to procedure and updates within 24 hours of procedure as applicable: NA  Relevant diagnostic and radiology test results appropriately labeled and displayed as applicable: Yes  Procedure site(s) marked with provider initials: NA    TIMEOUT:  Time-Out performed immediately prior to starting procedure, including verbal and active participation of all team members addressing the following:Yes  * Correct patient identify  * Confirmed that the correct side and site are marked  * An accurate procedure consent form  * Agreement on the procedure to be done  * Correct patient position  * Relevant images and results are properly labeled and appropriately displayed  * The need to administer antibiotics or fluids for irrigation purposes during the procedure as applicable   * Safety precautions based on patient history or medication use    DURING PROCEDURE: Verification of correct person, site, and procedures any time the responsibility for care of the patient is transferred to another member of the care team.       Prior to injection, verified patient identity using patient's name and date  of birth.  Due to injection administration, patient instructed to remain in clinic for 15 minutes  afterwards, and to report any adverse reaction to me immediately.    Joint injection was performed.      Drug Amount Wasted:  Yes: 4 mg/ml   Vial/Syringe: Single dose vial  Expiration Date:  9/1/25      Suyapa Wilson ATC  January 4, 2022

## 2022-01-04 NOTE — TELEPHONE ENCOUNTER
DIAGNOSIS: Right SC joint steroid injection ref. Dr. Crane   APPOINTMENT DATE: 1.4.22   NOTES STATUS DETAILS   OFFICE NOTE from referring provider Internal 1.3.22 Dr Luca Crane, [facility]  12.16.19  10.14.19  8.5.19  6.17.19   OFFICE NOTE from other specialist [facility] records scanned    DISCHARGE SUMMARY from hospital N/A    DISCHARGE REPORT from the ER N/A    OPERATIVE REPORT Internal 9.27.19   EMG report N/A    MEDICATION LIST Internal    MRI Internal 6.4.19 sternum   DEXA (osteoporosis/bone health) N/A    CT SCAN N/A    XRAYS (IMAGES & REPORTS) Internal 1.3.22 clavicle  12.16.19 clavicle  6.17.19 R clavicle  12.15.15 R clavicle

## 2022-01-04 NOTE — LETTER
1/4/2022      RE: Ivan Guzman  [address] [address] [address]         [facility]  SPORTS & ORTHOPEDIC CLINIC VISIT     Jan 4, 2022        Ultrasound Guided Right Sternoclavicular Joint Injection    Date/Time: 1/4/2022 10:39 AM  Performed by: Carlos Moraes MD  Authorized by: Carlos Moraes MD     Indications:  Pain  Needle Size:  25 G  Guidance: ultrasound    Approach:  Anterior  Location:  Shoulder  Location comment:  R Sternoclavicular   Medications:  40 mg triamcinolone 40 MG/ML; 1 mL lidocaine (PF) 1 %  Outcome:  Tolerated well, no immediate complications  Procedure discussed: discussed risks, benefits, and alternatives    Consent Given by:  Patient  Timeout: timeout called immediately prior to procedure    Prep: patient was prepped and draped in usual sterile fashion     Patient was positioned lying supine on exam table.  The sternoclavicular joint was identified with ultrasound.  No significant effusion demonstrated on ultrasound exam. Minimal enhancement with PDI. The area was cleaned with a chlorhexidine swab.  Ultrasound guidance was necessary due to the postoperative changes to the joint as well as proximity to significant vasculature to ensure proper placement of the needle.  Using sterile technique with continuous ultrasound guidance a 25-gauge needle was advanced into the sternoclavicular joint with an in plane approach.  Next a solution of 40 mg triamcinolone and 1 mL 1% lidocaine was injected and seen flowing into the joint.  Images were captured and saved to the permanent record.  The patient tolerated the procedure well.  Routine postinjection instructions were given.  He was given a prescription for diclofenac to be used in place of the ibuprofen he is currently been taking as it had been ineffective.  He will call or message us in 2 weeks to let us know about his response to the injection.  Carlos Moraes MD

## 2022-01-18 ENCOUNTER — TELEPHONE (OUTPATIENT)
Dept: ORTHOPEDICS | Facility: CLINIC | Age: 22
End: 2022-01-18
Payer: COMMERCIAL

## 2022-01-18 NOTE — TELEPHONE ENCOUNTER
Pt's mother reports injection given on 1/4/22 has helped with pain relief only when pt is at rest.  However, when he moves his head pt is still experiencing pain.  Writer helped make an appointment with Dr. Crane on 2/7/22 to follow up.    Alyssa Izaguirre RN on 1/18/2022 at 3:33 PM

## 2022-01-18 NOTE — TELEPHONE ENCOUNTER
[facility] Call Center    Phone Message    May a detailed message be left on voicemail: yes     Reason for Call: Other: patients mom would like realy update on patient after the injection      Action Taken: Message routed to:  [facility]o    Travel Screening: Not Applicable     Per pt's mom-  it has helped some w/ the pain when he is not using the arm -- but he still cannot do anything i.e turn head -- would like ot know if they should come back to see Dr.see Moraes or see Dr. colin ??

## 2022-01-20 ENCOUNTER — VIRTUAL VISIT (OUTPATIENT)
Dept: BEHAVIORAL HEALTH | Facility: CLINIC | Age: 22
End: 2022-01-20
Attending: ORTHOPAEDIC SURGERY
Payer: COMMERCIAL

## 2022-01-20 DIAGNOSIS — F43.21 ADJUSTMENT DISORDER WITH DEPRESSED MOOD: Primary | ICD-10-CM

## 2022-01-20 DIAGNOSIS — M25.511 STERNOCLAVICULAR JOINT PAIN, RIGHT: ICD-10-CM

## 2022-01-20 PROCEDURE — 90834 PSYTX W PT 45 MINUTES: CPT | Mod: 95

## 2022-01-20 ASSESSMENT — COLUMBIA-SUICIDE SEVERITY RATING SCALE - C-SSRS
1. IN THE PAST MONTH, HAVE YOU WISHED YOU WERE DEAD OR WISHED YOU COULD GO TO SLEEP AND NOT WAKE UP?: NO
1. IN THE PAST MONTH, HAVE YOU WISHED YOU WERE DEAD OR WISHED YOU COULD GO TO SLEEP AND NOT WAKE UP?: YES

## 2022-01-20 NOTE — PROGRESS NOTES
"[facility]: Integrated Behavioral Health  January 20, 2022      Behavioral Health Clinician Progress Note    Patient Name: Ivan Guzman           Service Type:  Individual      Service Location:   Phone call (patient / identified key support person reached)     Session Start Time: 11:05a  Session End Time: 11:57a      Session Length: 38 - 52      Attendees: Client    Visit Activities (Refresh list every visit): NEW    Diagnostic Assessment Date: In Progress  Treatment Plan Review Date: In Progress  See Flowsheets for today's PHQ-9 and KERON-7 results  Previous PHQ-9:   PHQ-9 SCORE 5/10/2019 12/16/2019 1/3/2022   PHQ-9 Total Score - - -   PHQ-9 Total Score MyChart - 3 (Minimal depression) -   PHQ-9 Total Score 1 3 12     Previous KERON-7:   KERON-7 SCORE 6/14/2016 12/6/2017 5/10/2019   Total Score - - -   Total Score 1 6 5       TRACIE LEVEL:  No flowsheet data found.    DATA  Extended Session (60+ minutes): No  Interactive Complexity: No  Crisis: No  [facility] Patient: No    Treatment Objective(s) Addressed in This Session:  Target Behavior(s): disease management/lifestyle changes - experiencing stagnation and discomfort/depression while waiting for treatment options to alleviate sternoclavicular pain    Depressed Mood: Increase interest, engagement, and pleasure in doing things  Decrease frequency and intensity of feeling down, depressed, hopeless    Current Stressors / Issues:  The patient has been notified of the following:      \"We have found that certain health care needs can be provided without the need for a face to face visit.  This service lets us provide the care you need with a phone conversation.       I will have full access to your [place] medical record during this entire phone call.   I will be taking notes for your medical record.      Since this is like an office visit, we will bill your insurance company for this service.       There are potential benefits and " "risks of telephone visits (e.g. limits to patient confidentiality) that differ from in-person visits.?  Confidentiality still applies for telephone services, and nobody will record the visit.  It is important to be in a quiet, private space that is free of distractions (including cell phone or other devices) during the visit.??      If during the course of the call I believe a telephone visit is not appropriate, you will not be charged for this service\"     Consent has been obtained for this service by care team member: Yes     Ivan walked through his experience with his sternoclavicular pain and associated distress since 11th grade--he began experiencing the pain and range of motion issues when he was on the swim team. 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 Some relief of pain with cortisone, but describes having no range of motion. Uses ibuprofen and heat as well--has an appt with Dr Crane early Feb to discuss surgery again.    Ivan is frustrated, and \"wanting to get my life started\", but has been prevented because of recurring arm/shoulder issues. He enjoys warehouse work and would be interested in pursuing that if his shoulder limitations can be corrected. He is also contemplating school, possibly for electrical or HVAC credentials, but feels ambivalent and stuck about making any decisions until there is a plan of action re: his shoulder/arm. He recently moved back to his mom's house after spending 8mos living with a friend in [place], [place]; reason for the move was mostly to attempt to resolve his health issues more permanently. Ivan enjoyed [place], and got along with his friend/roommate there; he is also enjoying being back home and connecting with good friends in his hometown.    Ivan described his anxiety in high school, that he did experience " some SI and practiced self-harm (cutting), and sought therapy at the time which he found to be beneficial. He reports that his primary complaint was social anxiety, understanding social cues to facilitate social relationships. He reports that while this is still something he struggles with, he ranks his areas for personal growth in order of importance as !) pain management techniques, 2) coping with being in a transition phase/stalled waiting for healthcare, and 3) social anxiety.    Ivan has several strengths/supports--he has a good & supportive relationship with his mom, he connects with many friends virtually through gilberto platforms, and identified that he has friends on whom he can call for in-person socializing. Provided psychoeducation on the benefits of intentional socialization & routine (e.g. playing DnD every Sunday); Ivan identified that occasionally he gets into patterns of watching YouTube for several hours and feels very drained & depressed when he does this. Discussed alternative strategies for occupying his time; Ivan has begun teaching himself chess from YouTube and playing out configurations. Brainstormed other options for mentally engaging materials online rather than passively watching videos. Affirmed his move towards recognizing when he feels drawn toward avoidance with watching internet content, and intentionally choosing to do something else (e.g. meet a friend for coffee).    Progress on Treatment Objective(s) / Homework:  New Objective established this session - PREPARATION (Decided to change - considering how); Intervened by negotiating a change plan and determining options / strategies for behavior change, identifying triggers, exploring social supports, and working towards setting a date to begin behavior change    Motivational Interviewing    MI Intervention: Co-Developed Goal: awareness of the draw toward maladaptive coping mechanisms (e.g. passively watching YouTube for many hours in  bed); brainstormed appealing creative outlets (learning chess); discussed the therapeutic benefit of routine and maintaining regular social commitments (plays DnD every Sunday with friends), Supported Autonomy, Collaboration, Evocation and Open-ended questions     Change Talk Expressed by the Patient: Desire to change    Provider Response to Change Talk: E - Evoked more info from patient about behavior change and A - Affirmed patient's thoughts, decisions, or attempts at behavior change    Also provided psychoeducation about behavioral health condition, symptoms, and treatment options    Care Plan review completed: No    Medication Review:  No current psychiatric medications prescribed    Medication Compliance:  NA    Changes in Health Issues:   Yes: Pain, Associated Psychological Distress  Injury stasis (limited range of motion prohibits many work and/or hobbies), Associated Psychological Distress    Chemical Use Review:   Substance Use: Chemical use reviewed, no active concerns identified      Tobacco Use: No current tobacco use.      Assessment: Current Emotional / Mental Status (status of significant symptoms):  Risk status (Self / Other harm or suicidal ideation)  Patient has had a history of suicidal ideation: desire to be dead during freshman yr of high school (5+ yrs ago); rec'd therapy, no SI since and self-injurious behavior: in high school, engaged in non-suicidal cutting behaviors; no self-harm since sophomore yr (3+yrs ago)  Patient denies current fears or concerns for personal safety.  Patient denies current or recent suicidal ideation or behaviors.  Patient denies current or recent homicidal ideation or behaviors.  Patient denies current or recent self injurious behavior or ideation.  Patient denies other safety concerns.  A safety and risk management plan has not been developed at this time, however patient was encouraged to call [place] [phone] should there be a change in any of these risk  factors.    Appearance:   Appropriate  - unable to determine, non-video phone call   Eye Contact:   unable to determine, non-video phone call   Psychomotor Behavior: Normal  - unable to determine, non-video phone call   Attitude:   Cooperative  Guarded   Orientation:   All  Speech   Rate / Production: Normal    Volume:  Normal   Mood:    Normal  Affect:    Appropriate  Restricted   Thought Content:  Clear   Thought Form:  Coherent  Logical   Insight:    Fair     Diagnoses:  1. Adjustment disorder with depressed mood    2. Sternoclavicular joint pain, right        Collateral Reports Completed:  Not Applicable    Plan: (Homework, other):  Patient was given information about behavioral services and encouraged to schedule a follow up appointment with the clinic [facility] in 3 weeks.  He was also given information about mental health symptoms and treatment options .  CD Recommendations: No indications of CD issues.        Rosario Beaver  January 20, 2022

## 2022-01-24 DIAGNOSIS — M25.511 STERNOCLAVICULAR JOINT PAIN, RIGHT: ICD-10-CM

## 2022-01-24 RX ORDER — DICLOFENAC SODIUM 75 MG/1
75 TABLET, DELAYED RELEASE ORAL 2 TIMES DAILY
Qty: 28 TABLET | Refills: 1 | OUTPATIENT
Start: 2022-01-24

## 2022-01-31 ENCOUNTER — TELEPHONE (OUTPATIENT)
Dept: ORTHOPEDICS | Facility: CLINIC | Age: 22
End: 2022-01-31
Payer: COMMERCIAL

## 2022-01-31 NOTE — TELEPHONE ENCOUNTER
[facility] Call Center    Phone Message:  Pt's mother called in regards to pt's pain level.  Pt's mother stated pt is in so much pain, he is no longer able to sleep.  Pt's mother stated the Tylenol PM, pain meds, and Cortisone injection are no longer helping with pt's pain.    Pt's mother would like a CALL BACK to discuss her concerns and a PLAN for pt's pain control.  Pt's mother would also like pt seen sooner than his scheduled appt, if possible.    Aimee, Mother:  [phone]    Pt's mother transferred to Nurse Triage Line, in addition to this message.    May a detailed message be left on voicemail: Yes     Reason for Call: Other: Pt's Mother Called:  Pt's Pain Level     Action Taken: Message routed to:  [facility] Team    Travel Screening: Not Applicable

## 2022-02-01 NOTE — TELEPHONE ENCOUNTER
Called patient's mother, Aimee to review patients pain.  Aimee reports pt is in so much pain, reports this pain is constant.  After he received the steroid injection from sports it did help some with pain relief only at rest.  Now the pain is constant even at rest and so much so patient is unable to sleep at night.  Aimee reports patient is taking Voltaren, Tylenol and Tylenol PM with no relief.  She is unsure what the pain feels like to the patient, she attempted to contact him while on the phone but unable to describe to writer if the pain is dull/sharp/aching etc.  Aimee reports ice is not helpful for patient, that heat has been helpful and he has been utilizing this.    Writer reviewed with Aimee that next appt is on 2/7/22 with Dr. Crane.  That is the next time he is in clinic, there are no sooner availabilities.  Writer recommended they could call Sports Medicine to see if they have availability to see him same day or next day or he could see his PCP/urgent care if needed.    Pt mother verbalized understanding and in agreement of the recommendations.    Alyssa Izaguirre RN on 2/1/2022 at 8:16 AM

## 2022-02-07 ENCOUNTER — OFFICE VISIT (OUTPATIENT)
Dept: ORTHOPEDICS | Facility: CLINIC | Age: 22
End: 2022-02-07
Payer: COMMERCIAL

## 2022-02-07 DIAGNOSIS — M25.511 PAIN OF RIGHT STERNOCLAVICULAR JOINT: Primary | ICD-10-CM

## 2022-02-07 PROCEDURE — 99214 OFFICE O/P EST MOD 30 MIN: CPT | Mod: GC | Performed by: ORTHOPAEDIC SURGERY

## 2022-02-07 RX ORDER — IBUPROFEN 800 MG/1
800 TABLET, FILM COATED ORAL
Qty: 42 TABLET | Refills: 0 | Status: SHIPPED | OUTPATIENT
Start: 2022-02-07 | End: 2022-08-01

## 2022-02-07 RX ORDER — HYDROXYZINE PAMOATE 25 MG/1
25 CAPSULE ORAL 3 TIMES DAILY PRN
Qty: 30 CAPSULE | Refills: 0 | Status: SHIPPED | OUTPATIENT
Start: 2022-02-07 | End: 2022-03-23

## 2022-02-07 RX ORDER — OXYCODONE HYDROCHLORIDE 5 MG/1
5 TABLET ORAL EVERY 6 HOURS PRN
Qty: 15 TABLET | Refills: 0 | Status: SHIPPED | OUTPATIENT
Start: 2022-02-07 | End: 2022-03-23

## 2022-02-07 RX ORDER — GABAPENTIN 300 MG/1
300 CAPSULE ORAL AT BEDTIME
Qty: 30 CAPSULE | Refills: 0 | Status: SHIPPED | OUTPATIENT
Start: 2022-02-07 | End: 2022-03-23

## 2022-02-07 NOTE — NURSING NOTE
Reason For Visit:   Chief Complaint   Patient presents with     Surgical Followup     DOS 9/27/19 S/P Right Sternoclavicular Joint Reconstruction With Allograft     RECHECK     Injection only helped for around 2 weeks. Pain is back.        PCP: Sebastien Guerrero     ?no  Occupation Not working  Date of injury: None  Date of surgery: 9/27/2019  Type of surgery: Right shoulder sternoclavicular joint reconstruction with allograft semitendinosis.   Smoker: None     Right hand dominant    SANE score  Affected shoulder: Right   Right shoulder SANE: 10  Left shoulder SANE: 100    There were no vitals taken for this visit.      Pain Assessment  Patient Currently in Pain: Yes  0-10 Pain Scale: 8  Primary Pain Location: Shoulder  Pain Descriptors: Discomfort    Ryann Soto LPN

## 2022-02-07 NOTE — PROGRESS NOTES
I saw the patient with the resident or fellow.  I agree with the resident or fellow note and plan of care.      Luca Crane MD

## 2022-02-07 NOTE — LETTER
Date:February 11, 2022      Patient was self referred, no letter generated. Do not send.        [facility] Health Information

## 2022-02-07 NOTE — LETTER
2/7/2022         RE: Ivan Guzman  [address] [facility] [address]        Dear Colleague,    Thank you for referring your patient, Ivan Guzman, to the [facility] [place]. Please see a copy of my visit note below.    I saw the patient with the resident or fellow.  I agree with the resident or fellow note and plan of care.      Luca Crane MD      CHIEF COMPLAINT:  Status post right shoulder sternoclavicular joint reconstruction with allograft semitendinosis - 9/27/19 with Dr. Crane    HISTORY:  Unfortunately Ivan is still having significant pain at this time.  It briefly improved with his injection - when not moving he had 100% pain relief, and he was at a 3-4/10 on the pain scale with movement.  This pain relief lasted for about 2 weeks and then his pain rebounded to where it was prior to the injection and has continued to worsen since that time.  He's having difficulty with sleep and is not able to participate in all his activities of daily living at this time due to the pain.  He does not feel instability symptoms about his sternoclavicular joint, only pain, worse with movement.  All of his pain is anterior.    EXAM:    General: Awake, Alert, and oriented. Has a flat and depressed affect and avoids eye contact.     Right upper Extremity:    Incisions well healed without evidence of infection    Significant tenderness to palpation at incision and around SC joint, very hypersensitive with even light touch    Range of motion forward flexion to 60, external rotation to 60, internal rotation to T10.  Forward flexion is most painful but all cause pain.  No sensation to patient or me of SC joint instability with range of motion    Strength is 5/5 with pain in forward flexion, external rotation, internal rotation.    Further exam deferred due to pain    Neurovascularly intact distally    ASSESSMENT:  1. Pain, right sternoclavicular joint, following prior surgical  intervention as above.    PLAN:   We discussed at length with the patient and his mother.  We discussed that we don't have a good answer for what is causing this extreme level of pain for the patient, particularly considering the amount of time he was symptom free post-operatively.    We discussed next steps including an option to obtain a CTA to determine if there is any reason to pursue operative intervention versus non-operative pain management (Dr. Guzman) to help with pain control.  The patient and his mother elected to proceed with CTA, which was ordered today.    We also recommended the patient discuss with his PCP getting a referral to the chronic pain team.  At this point he is unable to sleep and the patient's mother is requesting medications to help with this.  We prescribed multimodal pain control including a short dose of narcotic pain medication, but reinforced that we will unable to refill this prescription.  If he is going to need further pain control, he will need to work with a chronic pain team to help develop a plan with the patient and his primary care physician.    The patient and his mother understand and agree.  All of their questions were answered.  We will call them to discuss the results of the CTA after we have a chance to review it and review it with our vascular surgeon.    The patient was seen and examined with Dr. Crane, who agrees with the assessment and plan as above.    Keren Reece MD  Orthopaedic Surgery Sports Medicine Fellow        Again, thank you for allowing me to participate in the care of your patient.        Sincerely,        Luca Crane MD

## 2022-02-08 ENCOUNTER — HOSPITAL ENCOUNTER (OUTPATIENT)
Dept: CT IMAGING | Facility: CLINIC | Age: 22
Discharge: HOME | End: 2022-02-08
Attending: ORTHOPAEDIC SURGERY | Admitting: ORTHOPAEDIC SURGERY
Payer: COMMERCIAL

## 2022-02-08 DIAGNOSIS — M25.511 PAIN OF RIGHT STERNOCLAVICULAR JOINT: ICD-10-CM

## 2022-02-08 PROCEDURE — 250N000009 HC RX 250: Performed by: RADIOLOGY

## 2022-02-08 PROCEDURE — 250N000011 HC RX IP 250 OP 636: Performed by: RADIOLOGY

## 2022-02-08 PROCEDURE — 71275 CT ANGIOGRAPHY CHEST: CPT

## 2022-02-08 RX ORDER — IOPAMIDOL 755 MG/ML
80 INJECTION, SOLUTION INTRAVASCULAR ONCE
Status: COMPLETED | OUTPATIENT
Start: 2022-02-08 | End: 2022-02-08

## 2022-02-08 RX ADMIN — SODIUM CHLORIDE 100 ML: 9 INJECTION, SOLUTION INTRAVENOUS at 14:43

## 2022-02-08 RX ADMIN — IOPAMIDOL 80 ML: 755 INJECTION, SOLUTION INTRAVENOUS at 14:43

## 2022-02-10 NOTE — PROGRESS NOTES
CHIEF COMPLAINT:  Status post right shoulder sternoclavicular joint reconstruction with allograft semitendinosis - 9/27/19 with Dr. Crane    HISTORY:  Unfortunately Ivan is still having significant pain at this time.  It briefly improved with his injection - when not moving he had 100% pain relief, and he was at a 3-4/10 on the pain scale with movement.  This pain relief lasted for about 2 weeks and then his pain rebounded to where it was prior to the injection and has continued to worsen since that time.  He's having difficulty with sleep and is not able to participate in all his activities of daily living at this time due to the pain.  He does not feel instability symptoms about his sternoclavicular joint, only pain, worse with movement.  All of his pain is anterior.    EXAM:    General: Awake, Alert, and oriented. Has a flat and depressed affect and avoids eye contact.     Right upper Extremity:    Incisions well healed without evidence of infection    Significant tenderness to palpation at incision and around SC joint, very hypersensitive with even light touch    Range of motion forward flexion to 60, external rotation to 60, internal rotation to T10.  Forward flexion is most painful but all cause pain.  No sensation to patient or me of SC joint instability with range of motion    Strength is 5/5 with pain in forward flexion, external rotation, internal rotation.    Further exam deferred due to pain    Neurovascularly intact distally    ASSESSMENT:  1. Pain, right sternoclavicular joint, following prior surgical intervention as above.    PLAN:   We discussed at length with the patient and his mother.  We discussed that we don't have a good answer for what is causing this extreme level of pain for the patient, particularly considering the amount of time he was symptom free post-operatively.    We discussed next steps including an option to obtain a CTA to determine if there is any reason to pursue operative  intervention versus non-operative pain management (Dr. Guzman) to help with pain control.  The patient and his mother elected to proceed with CTA, which was ordered today.    We also recommended the patient discuss with his PCP getting a referral to the chronic pain team.  At this point he is unable to sleep and the patient's mother is requesting medications to help with this.  We prescribed multimodal pain control including a short dose of narcotic pain medication, but reinforced that we will unable to refill this prescription.  If he is going to need further pain control, he will need to work with a chronic pain team to help develop a plan with the patient and his primary care physician.    The patient and his mother understand and agree.  All of their questions were answered.  We will call them to discuss the results of the CTA after we have a chance to review it and review it with our vascular surgeon.    The patient was seen and examined with Dr. Crane, who agrees with the assessment and plan as above.    Keren Reece MD  Orthopaedic Surgery Sports Medicine Fellow

## 2022-02-14 ENCOUNTER — VIRTUAL VISIT (OUTPATIENT)
Dept: BEHAVIORAL HEALTH | Facility: CLINIC | Age: 22
End: 2022-02-14
Payer: COMMERCIAL

## 2022-02-14 DIAGNOSIS — M25.511 STERNOCLAVICULAR JOINT PAIN, RIGHT: ICD-10-CM

## 2022-02-14 DIAGNOSIS — F43.21 ADJUSTMENT DISORDER WITH DEPRESSED MOOD: Primary | ICD-10-CM

## 2022-02-14 PROCEDURE — 90834 PSYTX W PT 45 MINUTES: CPT | Mod: 95

## 2022-02-14 NOTE — PROGRESS NOTES
[facility]: Integrated Behavioral Health  February 14, 2022      Behavioral Health Clinician Progress Note    Patient Name: Ivan Guzman           Service Type:  Individual      Service Location:   Phone call (patient / identified key support person reached); video visit, patient's camera was off     Session Start Time: 10:14a  Session End Time: 11:00a      Session Length: 38 - 52      Attendees: Client    Service Modality:  Video Visit:      Provider verified identity through the following two step process.  Patient provided:  Patient is known previously to provider    Telemedicine Visit: The patient's condition can be safely assessed and treated via synchronous audio and visual telemedicine encounter.      Reason for Telemedicine Visit: Services only offered telehealth    Originating Site (Patient Location): Patient's other - car    Distant Site (Provider Location): Provider Remote Setting- Home Office    Consent:  The patient/guardian has verbally consented to: the potential risks and benefits of telemedicine (video visit) versus in person care; bill my insurance or make self-payment for services provided; and responsibility for payment of non-covered services.     Patient would like the video invitation sent by:  My Chart    Mode of Communication:  Video Conference via Highlight    As the provider I attest to compliance with applicable laws and regulations related to telemedicine.      Visit Activities (Refresh list every visit): [facility] Only      Diagnostic Assessment Date: In Progress  Treatment Plan Review Date: In Progress  See Flowsheets for today's PHQ-9 and KERON-7 results  Previous PHQ-9:   PHQ-9 SCORE 5/10/2019 12/16/2019 1/3/2022   PHQ-9 Total Score - - -   PHQ-9 Total Score MyChart - 3 (Minimal depression) -   PHQ-9 Total Score 1 3 12     Previous KERON-7:   KERON-7 SCORE 6/14/2016 12/6/2017 5/10/2019   Total Score - - -   Total Score 1 6 5       TRACIE LEVEL:  No  "flowsheet data found.    DATA  Extended Session (60+ minutes): No  Interactive Complexity: No  Crisis: No  [facility] Patient: No    Treatment Objective(s) Addressed in This Session:  Target Behavior(s): disease management/lifestyle changes - experiencing stagnation and discomfort/depression while waiting for treatment options to alleviate sternoclavicular pain    Depressed Mood: Increase interest, engagement, and pleasure in doing things  Decrease frequency and intensity of feeling down, depressed, hopeless    Current Stressors / Issues:  The patient has been notified of the following:      \"We have found that certain health care needs can be provided without the need for a face to face visit.  This service lets us provide the care you need with a phone conversation.       I will have full access to your [facility] medical record during this entire phone call.   I will be taking notes for your medical record.      Since this is like an office visit, we will bill your insurance company for this service.       There are potential benefits and risks of telephone visits (e.g. limits to patient confidentiality) that differ from in-person visits.?  Confidentiality still applies for telephone services, and nobody will record the visit.  It is important to be in a quiet, private space that is free of distractions (including cell phone or other devices) during the visit.??      If during the course of the call I believe a telephone visit is not appropriate, you will not be charged for this service\"     Consent has been obtained for this service by care team member: Yes     Ivan is still waiting from the results from his CT scan to determine what next steps will be to alleviate his sternoclavicular pain; he has OTC & prescribed medication, but is reticent to take it unless he absolutely has to--his primary strategy is to distract himself. He's begun baking with yeast, reporting \"I think this is going to be a good hobby for me\". He " "also has been consistently and intentionally socializing with friends--sometimes this is difficult, as they often prefer to go out to bars. However he feels really comfortable bringing up his discomfort/triggers with his friends, and with taking breaks throughout the night (stepping outside every 15-30 minutes). He continues to teach himself chess, and is paying with some of his friends before their DND sessions.     Ivan sometimes feels some \"imposter syndrome\" around his pain/disability, worrying that \"it's not as big a deal as I'm making it out to be\". Provided some narrative context, discussed the value of \"talking to\" his imposter syndrome as though it's outside of himself (which he already does, and experiences success/respite as a result). Ivan feels part of these feelings have to do with some of his behaviors from high school: \"it's a bit of a problem because I used to be a pathological liar in high school...why did I do that?\" At the time he felt he was the \"arias child\" and \"got away with everything\", which encouraged him to continue to push those boundaries with social deceit. He was involved in theater; he felt that he was \"always acting\" because he \"just didn't want to be seen\".    Ivan reports that he's connecting with some friends from high school who did know him when he was habitually lying, although they didn't know each other well at the time. Now he feels really gratified by these friendships, particularly because these friends are especially outgoing and it is fulfilling to spend time with them. Provided psychoeducation about the evolutionary adaptation of pain, that it serves the purpose of both alerting the self to a problem and (theorized) to draw others towards us--this is definitely an adaptive coping strategy to alleviate his emotional distress over his chronic pain.    Progress on Treatment Objective(s) / Homework:  Satisfactory progress - PREPARATION (Decided to change - considering " how); Intervened by negotiating a change plan and determining options / strategies for behavior change, identifying triggers, exploring social supports, and working towards setting a date to begin behavior change    Motivational Interviewing    MI Intervention: Co-Developed Goal: awareness of the draw toward maladaptive coping mechanisms (e.g. passively watching YouTube for many hours in bed); brainstormed appealing creative outlets (learning [employer]); discussed the therapeutic benefit of routine and maintaining regular social commitments (plays DnD every Sunday with friends), Supported Autonomy, Collaboration, Evocation and Open-ended questions     Change Talk Expressed by the Patient: Desire to change    Provider Response to Change Talk: E - Evoked more info from patient about behavior change and A - Affirmed patient's thoughts, decisions, or attempts at behavior change    Also provided psychoeducation about behavioral health condition, symptoms, and treatment options    Care Plan review completed: No    Medication Review:  No current psychiatric medications prescribed    Medication Compliance:  NA    Changes in Health Issues:   Yes: Pain, Associated Psychological Distress  Injury stasis (limited range of motion prohibits many work and/or hobbies), Associated Psychological Distress    Chemical Use Review:   Substance Use: Chemical use reviewed, no active concerns identified      Tobacco Use: No current tobacco use.      Assessment: Current Emotional / Mental Status (status of significant symptoms):  Risk status (Self / Other harm or suicidal ideation)  Patient has had a history of suicidal ideation: desire to be dead during freshman yr of high school (5+ yrs ago); rec'd therapy, no SI since and self-injurious behavior: in high school, engaged in non-suicidal cutting behaviors; no self-harm since sophomore yr (3+yrs ago)  Patient denies current fears or concerns for personal safety.  Patient denies current or recent  suicidal ideation or behaviors.  Patient denies current or recent homicidal ideation or behaviors.  Patient denies current or recent self injurious behavior or ideation.  Patient denies other safety concerns.  A safety and risk management plan has not been developed at this time, however patient was encouraged to call [place] [phone] should there be a change in any of these risk factors.    Appearance:   Appropriate  - unable to determine, non-video phone call   Eye Contact:   unable to determine, non-video phone call   Psychomotor Behavior: Normal  - unable to determine, non-video phone call   Attitude:   Cooperative  Guarded   Orientation:   All  Speech   Rate / Production: Normal    Volume:  Normal   Mood:    Normal  Affect:    Appropriate  Restricted   Thought Content:  Clear   Thought Form:  Coherent  Logical   Insight:    Fair     Diagnoses:  1. Adjustment disorder with depressed mood    2. Sternoclavicular joint pain, right        Collateral Reports Completed:  Not Applicable    Plan: (Homework, other):  Patient was given information about behavioral services and encouraged to schedule a follow up appointment with the clinic [facility] in 3 weeks.  He was also given information about mental health symptoms and treatment options .  CD Recommendations: No indications of CD issues.        Rosario Beaver  February 14, 2022

## 2022-02-21 ENCOUNTER — TELEPHONE (OUTPATIENT)
Dept: ORTHOPEDICS | Facility: CLINIC | Age: 22
End: 2022-02-21
Payer: COMMERCIAL

## 2022-02-21 ASSESSMENT — COLUMBIA-SUICIDE SEVERITY RATING SCALE - C-SSRS
3. HAVE YOU BEEN THINKING ABOUT HOW YOU MIGHT KILL YOURSELF?: NO
4. HAVE YOU HAD THESE THOUGHTS AND HAD SOME INTENTION OF ACTING ON THEM?: NO
1. IN THE PAST MONTH, HAVE YOU WISHED YOU WERE DEAD OR WISHED YOU COULD GO TO SLEEP AND NOT WAKE UP?: NO
5. HAVE YOU STARTED TO WORK OUT OR WORKED OUT THE DETAILS OF HOW TO KILL YOURSELF? DO YOU INTEND TO CARRY OUT THIS PLAN?: NO
2. HAVE YOU ACTUALLY HAD ANY THOUGHTS OF KILLING YOURSELF IN THE PAST MONTH?: NO
1. IN THE PAST MONTH, HAVE YOU WISHED YOU WERE DEAD OR WISHED YOU COULD GO TO SLEEP AND NOT WAKE UP?: YES
6. HAVE YOU EVER DONE ANYTHING, STARTED TO DO ANYTHING, OR PREPARED TO DO ANYTHING TO END YOUR LIFE?: YES

## 2022-02-21 NOTE — TELEPHONE ENCOUNTER
[facility] Call Center    Phone Message    May a detailed message be left on voicemail: yes     Reason for Call C T SCANS RESULT  Action Taken: Message routed to:  [facility] [place]    Travel Screening: Not Applicable

## 2022-02-21 NOTE — TELEPHONE ENCOUNTER
Patient was called back and was told that Dr. Crane is in communication with Dr. Bui in vascular and they are working on a plan.  He has reviewed the CT and and has stated that there is a possible surgical option.  Ivan was told that once there is a more concrete plan he will be contacted.

## 2022-02-28 NOTE — PROGRESS NOTES
Ivan Guzman  :  2000  DOS: 3/4/2022  MRN: [id number]    Sports Medicine Clinic Visit    PCP: No Ref-Primary, Physician    Ivan Guzman is a 21 year old Right hand dominant male who is seen as a self referral presenting with sternoclavicular pain    Chronic right sternoclavicular joint subluxations since childhood that would self reduce. Starting in , joint started to become painful and was unable to do any overhead movements without subluxation. Had allograft semitendonosis sternoclavicular surgery in 2019 and notes that for about 2 years, had helped with subluxation events and now notes more subluxation events and pain then was there pre-surgery. Has seen Dr. Moraes for corticosteroid injection into the right sternoclavicular joint on 2022, which helped with pain for a few weeks. Notes that he no longer has the shoulder ROM in shoulder abduction and shoulder flexion to cause the sternoclavicular joint to sublux. Has been heating, is on Oxycodone, Tylenol and Gabapentin. 7/10 pain at worst.    Social History: Currently unemployed    Review of Systems  Musculoskeletal: as above  Remainder of review of systems is negative including constitutional, CV, pulmonary, GI, Skin and Neurologic except as noted in HPI or medical history.    Past Medical History:   Diagnosis Date     Chest pain      Past Surgical History:   Procedure Laterality Date     GENITOURINARY SURGERY       none       ORCHIECTOMY SCROTAL  3/6/2014    Procedure: ORCHIECTOMY SCROTAL;  Scrotal Exploration,Left Orchidopexy, ,Possible Right Orchiectomy;  Surgeon: Nazario Alvarado MD;  Location: [facility] OR     STERNOPLASTY Right 2019    Procedure: Right Sternoclavicular Joint Reconstruction With Allograft;  Surgeon: Luca Crane MD;  Location: [facility] OR     Family History   Problem Relation Age of Onset     Hypertension Maternal Grandfather      Cancer Paternal Grandmother         colon cancer     Cancer Maternal  Grandmother      Hypertension Maternal Grandmother        Objective  BP (!) 147/92   Wt 75.3 kg (166 lb)   BMI 23.15 kg/m        General: healthy, alert and in no distress      HEENT: no scleral icterus or conjunctival erythema     Skin: no suspicious lesions or rash. No jaundice.     CV: regular rhythm by palpation, 2+ distal pulses, no pedal edema      Resp: normal respiratory effort without conversational dyspnea     Psych: normal mood and affect      Gait: nonantalgic, appropriate coordination and balance     Neuro: normal light touch sensory exam of the extremities. Motor strength as noted below     Right Shoulder exam    ROM:        forward flexion 110        abduction 100       internal rotation very mildly limited       external rotation lacking 10 degrees       asymmetric scapular motion on the right due to guarding, stiffness, pain    Tender:        Most focal over the sternoclavicular joint       Milder tenderness palpation of the midshaft clavicle and milder in the surrounding upper shoulder and anterior chest wall musculature    Non Tender:       remainder of shoulder       acromioclavicular joint       subacromial space       periscapular region    Strength:        abduction 5/5       internal rotation 5/5       external rotation 5/5       adduction 5/5    Impingement testing:       positive (+) crossover    Skin:       no visible deformities       well perfused       capillary refill brisk    Sensation:        normal sensation over shoulder and upper extremity       Radiology  CTA CHEST WITH CONTRAST 2/8/2022 3:03 PM     HISTORY:  21-year-old patient with pain in right sternoclavicular  joint. Request made to exclude possibility of vascular abnormality.     COMPARISON: June 18, 2019.     TECHNIQUE: Multiplanar and multiformatted CTA images obtained from the  lung apices through the lung bases after the uneventful administration  of Isovue-370 intravenous contrast given for a total of 80  mL.  Radiation dose for this scan was reduced using automated exposure  control, adjustment of the mA and/or kV according to patient size, or  iterative reconstruction technique. Three-D reformatted images were  created at a separate workstation.     FINDINGS: The visible thyroid gland is unremarkable. No abnormally  enlarged mediastinal lymph nodes. Heart size is normal. No pericardial  or pleural effusion. The visible solid organs in the upper abdomen are  unremarkable. No acute osseous abnormality. No pulmonary masses. The  thoracic aorta is normal in size and appearance. No dissection,  ulceration, or acute abnormality. Origins of the great arteries are  widely patent. The visible great vessels are widely patent including  right innominate, bilateral common carotid, and proximal bilateral  subclavian arteries are patent. The medial right clavicle is displaced  posteriorly relative to previous exam. No underlying vascular  abnormality. Well-corticated lucencies noted in the manubrium and  medial clavicle, perhaps related to previous screw fixation,  clinically correlate if these are postoperative findings. Lucency  noted in the lateral manubrium, also likely related to previous  surgery or abnormality given it is well corticated.                                                                      IMPRESSION:  Patent aortic arch and visualized great vessels. No acute  vascular abnormality identified.     ASIA CLARK MD       Exam: 3 views of the clavicles dated 1/3/2022.     COMPARISON: 12/16/2019.     CLINICAL HISTORY: Pain right sternoclavicular joint. History of right  sternoclavicular joint reconstruction with allograft.     FINDINGS: 3 views of the clavicles were obtained. No fracture in  either clavicle. The acromioclavicular joints are well aligned. The  visualized portions of the sternoclavicular joint are unremarkable.                                                                      IMPRESSION:  No acute bone abnormality in either clavicle.     REBECCA MARIE MD       Assessment:  1. Sternoclavicular joint pain, right    2. Arthritis of right sternoclavicular joint    3. History of shoulder surgery        Plan:  Discussed the assessment with the patient.  Follow up: prn based on clinical progress  Still awaiting follow up conversation from Dr Crane about potential surgical options given recent severe flare of pain  Good relief for 2 weeks from recent CSI, then pain returned  Certainly most concerning would presumably be the CTA finding of posterior position of the clavicular head compared to previous  No recent instability/subluxation of joint which is reassuring  Offered 2nd opinion for their peace-of-mind, referral placed to see Dr Mccarty of [facility] in [place]  Topical voltaren gel option reviewed  Gentle PT option reviewed, basic HEP provided, facilitated appt with Willam Freed next Tuesday  XR and CT images independently visualized and reviewed with patient today in clinic  We discussed modified progressive pain-free activity as tolerated  Home handouts provided and supportive care reviewed  All questions were answered today  Contact us with additional questions or concerns  Signs and sx of concern reviewed      Junaid Rosario DO, JEFFREY  Sports Medicine Physician  [facility]      Time spent in chart review, one-on-one evaluation, discussion with patient regarding: nature of problem, clinical course, prior treatments, therapeutic options, shared-decision making, potential procedures and referrals, and charting related to the visit: 32 minutes.  If applicable, time does not include time spent performing any procedure.          Disclaimer: This note consists of symbols derived from keyboarding, dictation and/or voice recognition software. As a result, there may be errors in the script that have gone undetected. Please consider this when interpreting information found in this  chart.

## 2022-03-04 ENCOUNTER — OFFICE VISIT (OUTPATIENT)
Dept: ORTHOPEDICS | Facility: CLINIC | Age: 22
End: 2022-03-04
Payer: COMMERCIAL

## 2022-03-04 VITALS — BODY MASS INDEX: 23.15 KG/M2 | SYSTOLIC BLOOD PRESSURE: 147 MMHG | WEIGHT: 166 LBS | DIASTOLIC BLOOD PRESSURE: 92 MMHG

## 2022-03-04 DIAGNOSIS — M19.011 ARTHRITIS OF RIGHT STERNOCLAVICULAR JOINT: ICD-10-CM

## 2022-03-04 DIAGNOSIS — Z98.890 HISTORY OF SHOULDER SURGERY: ICD-10-CM

## 2022-03-04 DIAGNOSIS — M25.511 STERNOCLAVICULAR JOINT PAIN, RIGHT: Primary | ICD-10-CM

## 2022-03-04 PROCEDURE — 99214 OFFICE O/P EST MOD 30 MIN: CPT | Performed by: FAMILY MEDICINE

## 2022-03-04 ASSESSMENT — PAIN SCALES - GENERAL: PAINLEVEL: MODERATE PAIN (4)

## 2022-03-04 NOTE — LETTER
3/4/2022         RE: Ivan Guzman  [id number] [facility] [address]        Dear Colleague,    Thank you for referring your patient, Ivan Guzman, to the [facility]. Please see a copy of my visit note below.    Ivan Guzman  :  2000  DOS: 3/4/2022  MRN: [id number]    Sports Medicine Clinic Visit    PCP: No Ref-Primary, Physician    Ivan Guzman is a 21 year old Right hand dominant male who is seen as a self referral presenting with sternoclavicular pain    Chronic right sternoclavicular joint subluxations since childhood that would self reduce. Starting in , joint started to become painful and was unable to do any overhead movements without subluxation. Had allograft semitendonosis sternoclavicular surgery in 2019 and notes that for about 2 years, had helped with subluxation events and now notes more subluxation events and pain then was there pre-surgery. Has seen Dr. Moraes for corticosteroid injection into the right sternoclavicular joint on 2022, which helped with pain for a few weeks. Notes that he no longer has the shoulder ROM in shoulder abduction and shoulder flexion to cause the sternoclavicular joint to sublux. Has been heating, is on Oxycodone, Tylenol and Gabapentin. 7/10 pain at worst.    Social History: Currently unemployed    Review of Systems  Musculoskeletal: as above  Remainder of review of systems is negative including constitutional, CV, pulmonary, GI, Skin and Neurologic except as noted in HPI or medical history.    Past Medical History:   Diagnosis Date     Chest pain      Past Surgical History:   Procedure Laterality Date     GENITOURINARY SURGERY       none       ORCHIECTOMY SCROTAL  3/6/2014    Procedure: ORCHIECTOMY SCROTAL;  Scrotal Exploration,Left Orchidopexy, ,Possible Right Orchiectomy;  Surgeon: Nazario Alvarado MD;  Location: [place] OR     STERNOPLASTY Right 2019    Procedure: Right Sternoclavicular Joint  Reconstruction With Allograft;  Surgeon: Luca Crane MD;  Location:  OR     Family History   Problem Relation Age of Onset     Hypertension Maternal Grandfather      Cancer Paternal Grandmother         colon cancer     Cancer Maternal Grandmother      Hypertension Maternal Grandmother        Objective  BP (!) 147/92   Wt 75.3 kg (166 lb)   BMI 23.15 kg/m        General: healthy, alert and in no distress      HEENT: no scleral icterus or conjunctival erythema     Skin: no suspicious lesions or rash. No jaundice.     CV: regular rhythm by palpation, 2+ distal pulses, no pedal edema      Resp: normal respiratory effort without conversational dyspnea     Psych: normal mood and affect      Gait: nonantalgic, appropriate coordination and balance     Neuro: normal light touch sensory exam of the extremities. Motor strength as noted below     Right Shoulder exam    ROM:        forward flexion 110        abduction 100       internal rotation very mildly limited       external rotation lacking 10 degrees       asymmetric scapular motion on the right due to guarding, stiffness, pain    Tender:        Most focal over the sternoclavicular joint       Milder tenderness palpation of the midshaft clavicle and milder in the surrounding upper shoulder and anterior chest wall musculature    Non Tender:       remainder of shoulder       acromioclavicular joint       subacromial space       periscapular region    Strength:        abduction 5/5       internal rotation 5/5       external rotation 5/5       adduction 5/5    Impingement testing:       positive (+) crossover    Skin:       no visible deformities       well perfused       capillary refill brisk    Sensation:        normal sensation over shoulder and upper extremity       Radiology  CTA CHEST WITH CONTRAST 2/8/2022 3:03 PM     HISTORY:  21-year-old patient with pain in right sternoclavicular  joint. Request made to exclude possibility of vascular  abnormality.     COMPARISON: June 18, 2019.     TECHNIQUE: Multiplanar and multiformatted CTA images obtained from the  lung apices through the lung bases after the uneventful administration  of Isovue-370 intravenous contrast given for a total of 80 mL.  Radiation dose for this scan was reduced using automated exposure  control, adjustment of the mA and/or kV according to patient size, or  iterative reconstruction technique. Three-D reformatted images were  created at a separate workstation.     FINDINGS: The visible thyroid gland is unremarkable. No abnormally  enlarged mediastinal lymph nodes. Heart size is normal. No pericardial  or pleural effusion. The visible solid organs in the upper abdomen are  unremarkable. No acute osseous abnormality. No pulmonary masses. The  thoracic aorta is normal in size and appearance. No dissection,  ulceration, or acute abnormality. Origins of the great arteries are  widely patent. The visible great vessels are widely patent including  right innominate, bilateral common carotid, and proximal bilateral  subclavian arteries are patent. The medial right clavicle is displaced  posteriorly relative to previous exam. No underlying vascular  abnormality. Well-corticated lucencies noted in the manubrium and  medial clavicle, perhaps related to previous screw fixation,  clinically correlate if these are postoperative findings. Lucency  noted in the lateral manubrium, also likely related to previous  surgery or abnormality given it is well corticated.                                                                      IMPRESSION:  Patent aortic arch and visualized great vessels. No acute  vascular abnormality identified.     ASIA CLARK MD       Exam: 3 views of the clavicles dated 1/3/2022.     COMPARISON: 12/16/2019.     CLINICAL HISTORY: Pain right sternoclavicular joint. History of right  sternoclavicular joint reconstruction with allograft.     FINDINGS: 3 views of the clavicles  were obtained. No fracture in  either clavicle. The acromioclavicular joints are well aligned. The  visualized portions of the sternoclavicular joint are unremarkable.                                                                      IMPRESSION: No acute bone abnormality in either clavicle.     REBECCA MARIE MD       Assessment:  1. Sternoclavicular joint pain, right    2. Arthritis of right sternoclavicular joint    3. History of shoulder surgery        Plan:  Discussed the assessment with the patient.  Follow up: prn based on clinical progress  Still awaiting follow up conversation from Dr Crane about potential surgical options given recent severe flare of pain  Good relief for 2 weeks from recent CSI, then pain returned  Certainly most concerning would presumably be the CTA finding of posterior position of the clavicular head compared to previous  No recent instability/subluxation of joint which is reassuring  Offered 2nd opinion for their peace-of-mind, referral placed to see Dr Mccarty of [facility] in [place]  Topical voltaren gel option reviewed  Gentle PT option reviewed, basic HEP provided, facilitated appt with Willam Freed next Tuesday  XR and CT images independently visualized and reviewed with patient today in clinic  We discussed modified progressive pain-free activity as tolerated  Home handouts provided and supportive care reviewed  All questions were answered today  Contact us with additional questions or concerns  Signs and sx of concern reviewed      Junaid Rosario DO, CAQ  Sports Medicine Physician  [facility]      Time spent in chart review, one-on-one evaluation, discussion with patient regarding: nature of problem, clinical course, prior treatments, therapeutic options, shared-decision making, potential procedures and referrals, and charting related to the visit: 32 minutes.  If applicable, time does not include time spent performing any  procedure.          Disclaimer: This note consists of symbols derived from keyboarding, dictation and/or voice recognition software. As a result, there may be errors in the script that have gone undetected. Please consider this when interpreting information found in this chart.        Again, thank you for allowing me to participate in the care of your patient.        Sincerely,        Junaid Rosario, DO

## 2022-03-05 ENCOUNTER — HEALTH MAINTENANCE LETTER (OUTPATIENT)
Age: 22
End: 2022-03-05

## 2022-03-06 ENCOUNTER — MYC MEDICAL ADVICE (OUTPATIENT)
Dept: ORTHOPEDICS | Facility: CLINIC | Age: 22
End: 2022-03-06
Payer: COMMERCIAL

## 2022-03-07 ENCOUNTER — PREP FOR PROCEDURE (OUTPATIENT)
Dept: ORTHOPEDICS | Facility: CLINIC | Age: 22
End: 2022-03-07
Payer: COMMERCIAL

## 2022-03-07 ENCOUNTER — VIRTUAL VISIT (OUTPATIENT)
Dept: BEHAVIORAL HEALTH | Facility: CLINIC | Age: 22
End: 2022-03-07
Payer: COMMERCIAL

## 2022-03-07 DIAGNOSIS — M25.511 PAIN OF RIGHT STERNOCLAVICULAR JOINT: Primary | ICD-10-CM

## 2022-03-07 DIAGNOSIS — Z53.9 NO SHOW: Primary | ICD-10-CM

## 2022-03-07 NOTE — TELEPHONE ENCOUNTER
"Called pt directly as we do not have authorization to speak with a \"Malcom Reeves\" who MyCharted on behalf of pt for prescription refill request.  No answer during phone call, writer left  with call back info.    Alyssa Izaguirre RN on 3/7/2022 at 10:14 AM    "

## 2022-03-07 NOTE — PROGRESS NOTES
Called PT for appointment, left 2 voicemails; left [facility] number and Stone Hugo's voicemail number if he's like to reschedule.    Rosario Beaver  [facility] Intern  March 7, 2022

## 2022-03-08 ENCOUNTER — HOSPITAL ENCOUNTER (OUTPATIENT)
Dept: PHYSICAL THERAPY | Facility: CLINIC | Age: 22
Setting detail: THERAPIES SERIES
End: 2022-03-08
Attending: FAMILY MEDICINE
Payer: COMMERCIAL

## 2022-03-08 DIAGNOSIS — M19.011 ARTHRITIS OF RIGHT STERNOCLAVICULAR JOINT: ICD-10-CM

## 2022-03-08 DIAGNOSIS — Z98.890 HISTORY OF SHOULDER SURGERY: ICD-10-CM

## 2022-03-08 DIAGNOSIS — M25.511 STERNOCLAVICULAR JOINT PAIN, RIGHT: ICD-10-CM

## 2022-03-08 PROCEDURE — 97110 THERAPEUTIC EXERCISES: CPT | Mod: GP | Performed by: PHYSICAL THERAPIST

## 2022-03-08 PROCEDURE — 97140 MANUAL THERAPY 1/> REGIONS: CPT | Mod: GP | Performed by: PHYSICAL THERAPIST

## 2022-03-08 PROCEDURE — 97161 PT EVAL LOW COMPLEX 20 MIN: CPT | Mod: GP | Performed by: PHYSICAL THERAPIST

## 2022-03-08 NOTE — PROGRESS NOTES
03/08/22 0800   General Information   Type of Visit Initial OP Ortho PT Evaluation   Start of Care Date 03/08/22   Referring Physician Abraham   Patient/Family Goals Statement decrease pain   Orders Evaluate and Treat   Date of Order 03/01/22   Medical Diagnosis SCJ, SP repair in 2019   Surgical/Medical history reviewed Yes   Precautions/Limitations no known precautions/limitations   Body Part(s)   Body Part(s) Shoulder   Presentation and Etiology   Pertinent history of current problem (include personal factors and/or comorbidities that impact the POC) hx of SCJ repair.  it went well.  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  started getting worse last august.  has been resting since it flared up.  saw Dr Rosario, who spoke with the surgeon.  had a CT.     Impairments A. Pain;B. Decreased WB tolerance;D. Decreased ROM;E. Decreased flexibility;F. Decreased strength and endurance   Functional Limitations perform activities of daily living;perform desired leisure / sports activities;perform required work activities   Symptom Location SCJ, some pain into the shoulder and down.  no T and N.     How/Where did it occur From insidious onset   Onset date of current episode/exacerbation 08/08/21   Chronicity Chronic   Pain rating (0-10 point scale) Best (/10);Worst (/10)   Best (/10) 3   Worst (/10) 8   Pain quality A. Sharp;B. Dull;C. Aching;D. Burning;E. Shooting   Frequency of pain/symptoms A. Constant   Pain/symptoms are: The same all the time   Pain/symptoms exacerbated by A. Sitting;C. Lifting;D. Carrying;E. Rest;G. Certain positions;H. Overhead reach;J. ADL;K. Home tasks   Pain/symptoms eased by G. Heat   Progression of symptoms since onset: Worsened   Fall Risk Screen   Have you fallen 2 or more times in the past year? No   Have you fallen and had an injury in the past year? No   Is patient a fall risk? No   Abuse Screen (yes response referral indicated)   Feels Unsafe at Home or Work/School no   Feels  Threatened by Someone no   Does Anyone Try to Keep You From Having Contact with Others or Doing Things Outside Your Home? no   Shoulder Objective Findings   Side (if bilateral, select both right and left) Right   Posture very rounded R>L   Cervical Screen (ROM, quadrant) stiff CTJ, R 1,2nd rib stiff and superior.  very tender ant and posteriorly.  FRS LT4 ERS RT2 T1, R 4th rib exhalation restriction.   Scapulothoracic Rhythm early protraction, UT sub   Neer's Test +   Palpation tender inf SCJ, very high tone UT and levotor   Right Shoulder Flexion AROM 90 wiht ERP   Right Shoulder Flexion PROM 90   Right Shoulder ER AROM 60 with pec and ERP   Right Shoulder ER PROM ER45 50 deg with SA pain, less with posterior glide.   Right Shoulder IR AROM T8   Right Shoulder IR PROM IR45 70 no pain   Right Shoulder IR Strength 5   Right Shoulder Extension Strength 5   Planned Therapy Interventions   Planned Therapy Interventions strengthening;stretching;ROM;neuromuscular re-education;manual therapy;joint mobilization   Clinical Impression   Criteria for Skilled Therapeutic Interventions Met yes, treatment indicated   PT Diagnosis SCJ pain, CTJ dysfunction   Influenced by the following impairments pain, guarding, stiffness   Functional limitations due to impairments reaching, lifting, sleeping   Clinical Presentation Stable/Uncomplicated   Clinical Presentation Rationale chronic pain, SP surgery   Clinical Decision Making (Complexity) Low complexity   Therapy Frequency 1 time/week   Predicted Duration of Therapy Intervention (days/wks) 8wk   Risk & Benefits of therapy have been explained Yes   Patient, Family & other staff in agreement with plan of care Yes   Clinical Impression Comments ACJ and clavicular pain.  Appears to have compensatory mechanical stiffness in the CT junction.  he felt less tone and pain after MT to improve CTJ mobility   Education Assessment   Preferred Learning Style Demonstration   Barriers to Learning No  barriers   ORTHO GOALS   PT Ortho Eval Goals 1;2;3   Ortho Goal 1   Goal Identifier 1   Goal Description pt will be able to wash hair with R shoulder without pain   Target Date 04/08/22   Ortho Goal 2   Goal Identifier 2   Goal Description pt will be able to reach top shelf without pain   Target Date 04/12/22   Ortho Goal 3   Goal Identifier 3   Goal Description pt will be able to lift 5# overhead without pain   Target Date 05/03/22   Total Evaluation Time   PT Eval, Low Complexity Minutes (24957) 35

## 2022-03-09 ENCOUNTER — TELEPHONE (OUTPATIENT)
Dept: ORTHOPEDICS | Facility: CLINIC | Age: 22
End: 2022-03-09
Payer: COMMERCIAL

## 2022-03-09 NOTE — TELEPHONE ENCOUNTER
Called pt to review surgical plan with patient.  No answer during phone call, writer left VM with call back instructions.    Alyssa Izaguirre RN on 3/9/2022 at 9:18 AM

## 2022-03-14 NOTE — TELEPHONE ENCOUNTER
Pt called back, relayed surgical plan as follows to patient -    Plan for right revision of resection of sternoclavicular joint with allograft reconstruction.  Reviewed this will be a multi surgeon case with Dr. Anderson and Dr. Bui from [facility].  Let patient know since this is a multisurgeon case he will get a call from our schedulers once OR availability has been determined.     Pt verbalized understanding and will reach out if he has any questions.    Alyssa Izaguirre RN on 3/14/2022 at 10:40 AM

## 2022-03-17 ENCOUNTER — TELEPHONE (OUTPATIENT)
Dept: ORTHOPEDICS | Facility: CLINIC | Age: 22
End: 2022-03-17
Payer: COMMERCIAL

## 2022-03-17 NOTE — TELEPHONE ENCOUNTER
Called and left voicemail for patient about scheduling surgery with Dr. Crane. Offered 6/27/2022 as a potential surgery date. Gave [phone] as call back number.

## 2022-03-23 ENCOUNTER — OFFICE VISIT (OUTPATIENT)
Dept: FAMILY MEDICINE | Facility: CLINIC | Age: 22
End: 2022-03-23
Payer: COMMERCIAL

## 2022-03-23 ENCOUNTER — HOSPITAL ENCOUNTER (OUTPATIENT)
Dept: PHYSICAL THERAPY | Facility: CLINIC | Age: 22
Setting detail: THERAPIES SERIES
Discharge: HOME | End: 2022-03-23
Attending: FAMILY MEDICINE
Payer: COMMERCIAL

## 2022-03-23 VITALS
HEART RATE: 74 BPM | DIASTOLIC BLOOD PRESSURE: 68 MMHG | RESPIRATION RATE: 16 BRPM | WEIGHT: 169 LBS | SYSTOLIC BLOOD PRESSURE: 128 MMHG | BODY MASS INDEX: 23.66 KG/M2 | OXYGEN SATURATION: 98 % | HEIGHT: 71 IN

## 2022-03-23 DIAGNOSIS — M25.511 PAIN OF RIGHT STERNOCLAVICULAR JOINT: ICD-10-CM

## 2022-03-23 PROCEDURE — 99213 OFFICE O/P EST LOW 20 MIN: CPT | Performed by: NURSE PRACTITIONER

## 2022-03-23 PROCEDURE — 97140 MANUAL THERAPY 1/> REGIONS: CPT | Mod: GP | Performed by: PHYSICAL THERAPIST

## 2022-03-23 RX ORDER — GABAPENTIN 300 MG/1
300 CAPSULE ORAL AT BEDTIME
Qty: 30 CAPSULE | Refills: 0 | Status: SHIPPED | OUTPATIENT
Start: 2022-03-23 | End: 2022-08-01

## 2022-03-23 RX ORDER — HYDROXYZINE PAMOATE 25 MG/1
25 CAPSULE ORAL 3 TIMES DAILY PRN
COMMUNITY
End: 2023-01-31

## 2022-03-23 RX ORDER — OXYCODONE HYDROCHLORIDE 5 MG/1
5 TABLET ORAL EVERY 6 HOURS PRN
Qty: 15 TABLET | Refills: 0 | Status: SHIPPED | OUTPATIENT
Start: 2022-03-23 | End: 2022-08-01

## 2022-03-23 ASSESSMENT — PAIN SCALES - GENERAL: PAINLEVEL: SEVERE PAIN (6)

## 2022-03-23 NOTE — PROGRESS NOTES
Assessment & Plan     Pain of right sternoclavicular joint    - gabapentin (NEURONTIN) 300 MG capsule; Take 1 capsule (300 mg) by mouth At Bedtime  - oxyCODONE (ROXICODONE) 5 MG tablet; Take 1 tablet (5 mg) by mouth every 6 hours as needed for severe pain  Discussed how to take the medication(s), expected outcomes, potential side effects.  See below for plan of care.  Short term RX for pain medication to be used sparingly and make last until surgery date which is scheduled.           See Patient Instructions  Patient Instructions   Continue with ice and heat to area of pain as needed.  Continue with gabapentin daily and try scheduled ibuprofen 600 mg and tylenol 1000 mg three times a day with food.  Take the pain pill only as needed for really bad pain until you can have your surgery.      Our Clinic hours are:  Mondays    7:20 am - 7 pm  Tues - Fri  7:20 am - 5 pm    Clinic Phone: [phone]    The clinic lab opens at 7:30 am Mon - Fri and appointments are required.    [facility]. [phone]  Monday  8 am - 7pm  Tues - Fri 8 am - 5:30 pm             Return in about 6 months (around 9/23/2022) for or sooner if symptoms persist or worsen.    ASHLEY Addison CNP  [facility]    Henna Love is a 21 year old who presents for the following health issues     History of Present Illness       Reason for visit:  Sturnoclavicular joint pain  Symptom onset:  More than a month  Symptoms include:  Pain reduced range of motion sleeplessness  Symptom intensity:  Severe  Symptom progression:  Staying the same  Had these symptoms before:  Yes  Has tried/received treatment for these symptoms:  Yes  Previous treatment was successful:  Yes  Prior treatment description:  Sturnoclavicular joint reconstruction  What makes it worse:  Moving right arm  What makes it better:  Heat, CBD    He eats 2-3 servings of fruits and vegetables daily.He consumes 1 sweetened  "beverage(s) daily.He exercises with enough effort to increase his heart rate 9 or less minutes per day.  He exercises with enough effort to increase his heart rate 3 or less days per week. He is missing 1 dose(s) of medications per week.       Accompanied by his mother.  Surgery on clavicle schedule but not until June. Pain is relentless. Mom states they will use narcotic only if needed for sleep due to increase in pain.  No changes in chronic pain. No other concerns voiced.        Review of Systems   Constitutional, HEENT, cardiovascular, pulmonary, gi and gu systems are negative, except as otherwise noted.      Objective    /68   Pulse 74   Resp 16   Ht 1.81 m (5' 11.25\")   Wt 76.7 kg (169 lb)   SpO2 98%   BMI 23.41 kg/m    Body mass index is 23.41 kg/m .  Physical Exam   GENERAL: healthy, alert and no distress  NECK: no adenopathy, no asymmetry  RESP: lungs clear   CV: regular rate and rhythm  MS: no gross musculoskeletal defects noted                  "

## 2022-03-23 NOTE — PATIENT INSTRUCTIONS
Continue with ice and heat to area of pain as needed.  Continue with gabapentin daily and try scheduled ibuprofen 600 mg and tylenol 1000 mg three times a day with food.  Take the pain pill only as needed for really bad pain until you can have your surgery.      Our Clinic hours are:  Mondays    7:20 am - 7 pm  Tues -  Fri  7:20 am - 5 pm    Clinic Phone: [phone]    The clinic lab opens at 7:30 am Mon - Fri and appointments are required.    [facility] [place]  Ph. [phone]  Monday  8 am - 7pm  Tues - Fri 8 am - 5:30 pm

## 2022-04-18 ENCOUNTER — VIRTUAL VISIT (OUTPATIENT)
Dept: ORTHOPEDICS | Facility: CLINIC | Age: 22
End: 2022-04-18
Payer: COMMERCIAL

## 2022-04-18 DIAGNOSIS — M25.511 PAIN OF RIGHT STERNOCLAVICULAR JOINT: Primary | ICD-10-CM

## 2022-04-18 PROCEDURE — 99214 OFFICE O/P EST MOD 30 MIN: CPT | Mod: 95 | Performed by: ORTHOPAEDIC SURGERY

## 2022-04-18 NOTE — NURSING NOTE
Ivan is a 21 year old who is being evaluated via a billable Telephone visit.      How would you like to obtain your AVS? Vamshi    Call mother at  [phone] and she will 3 way Ivan in.         Reason For Visit:   Chief Complaint   Patient presents with     RECHECK     Discuss surgical plan        PCP: Sebastien Guerrero     ?no  Occupation Not working  Date of injury: None  Date of surgery: 9/27/2019  Type of surgery: Right shoulder sternoclavicular joint reconstruction with allograft semitendinosis.   Smoker: None     Right hand dominant      SANE score  Affected shoulder:  Right   Right shoulder SANE: 15  Left shoulder SANE: 100    There were no vitals taken for this visit.      Pain Assessment  Patient Currently in Pain: Yes  0-10 Pain Scale: 6  Primary Pain Location: Shoulder  Pain Descriptors: Discomfort    Ryann Soto LPN

## 2022-04-18 NOTE — LETTER
4/18/2022         RE: Ivan Guzman  [address] [facility] [id number]        Dear Colleague,    Thank you for referring your patient, Ivan Guzman, to the [facility] [place]. Please see a copy of my visit note below.    TELEPHONE VISIT    CHIEF COMPLAINT:  Right shoulder sternoclavicular joint pain.    HISTORY OF PRESENT ILLNESS:  I had a telephone conference with Mr. Guzman.  His mother was teleconferenced in.  He has had no significant change in his symptoms.  I reviewed with him the findings on his CT scan.  To my interpretation it shows mild posterior subluxation of the sternoclavicular joint relative to the sternum.  The vessels appear patent, but per Dr. Bui, are in direct contact with the posterior aspect of the sternoclavicular joint.    ASSESSMENT: I had a lengthy talk with Ivan and his mother.  I told him that this surgery was more complicated than most surgeries as evidenced by the fact that we were planning a surgical team of 3 different surgeons.  In addition, we talked about the fact there could be a need for thoracotomy or other interventions.  I told him that the plan would be for surgery on 06/27/2022 with Dr. Bui and Dr. Anderson assisting.  I told him that there was a significant risk of continued symptoms, recurrent instability, injury to the great vessels, problems with the nerves and arteries that make the arm work, or reaction to the anesthetic with damage to the heart, lungs, brain and even death.  He demonstrated a good understanding of this and wished to proceed with surgical scheduling.  The plan will be for resection of the medial aspect of the clavicle with allograft reconstruction at the sternoclavicular joint through tunnels in the sternum and clavicle.  Additional assistance will be determined by my 2 expert colleagues who have been kind enough to offer to assist.    Luca Crane MD    cc:  Brittany Anderson MD  [facility]  [address] [place] [address]    Jaimie Bui MD  [facility]  [address] [place] [address]            Again, thank you for allowing me to participate in the care of your patient.        Sincerely,        Luca Crane MD

## 2022-04-18 NOTE — LETTER
Date:April 19, 2022      Provider requested that no letter be sent. Do not send.       [facility]

## 2022-04-18 NOTE — PROGRESS NOTES
TELEPHONE VISIT    CHIEF COMPLAINT:  Right shoulder sternoclavicular joint pain.    HISTORY OF PRESENT ILLNESS:  I had a telephone conference with Mr. Guzman.  His mother was teleconferenced in.  He has had no significant change in his symptoms.  I reviewed with him the findings on his CT scan.  To my interpretation it shows mild posterior subluxation of the sternoclavicular joint relative to the sternum.  The vessels appear patent, but per Dr. Bui, are in direct contact with the posterior aspect of the sternoclavicular joint.    ASSESSMENT: I had a lengthy talk with Ivan and his mother.  I told him that this surgery was more complicated than most surgeries as evidenced by the fact that we were planning a surgical team of 3 different surgeons.  In addition, we talked about the fact there could be a need for thoracotomy or other interventions.  I told him that the plan would be for surgery on 06/27/2022 with Dr. Bui and Dr. Anderson assisting.  I told him that there was a significant risk of continued symptoms, recurrent instability, injury to the great vessels, problems with the nerves and arteries that make the arm work, or reaction to the anesthetic with damage to the heart, lungs, brain and even death.  He demonstrated a good understanding of this and wished to proceed with surgical scheduling.  The plan will be for resection of the medial aspect of the clavicle with allograft reconstruction at the sternoclavicular joint through tunnels in the sternum and clavicle.  Additional assistance will be determined by my 2 expert colleagues who have been kind enough to offer to assist.    Luca Crane MD    cc:  Brittany Anderson MD  [facility]  [address] [place] [address]  [id number]    Jaimie Bui MD  [facility] [address]  [address]      Visit time: 23 mins

## 2022-04-19 NOTE — TELEPHONE ENCOUNTER
Called and left voicemail for mother about surgery with Dr. Crane. Gave 6/27/2022 as a surgery date. Gave [phone] as call back number.

## 2022-04-20 NOTE — TELEPHONE ENCOUNTER
Patient is scheduled for surgery with Dr. Crane    Spoke with: Aimee    Date of Surgery: 6/27/2022    Location: [place]    Informed patient they will need an adult  yes    Pre op with Provider n/a    H&P: Scheduled with pcp    Pre-procedure COVID-19 Test: [place] [facility] on 6/23/2022    Additional imaging/appointments: n/a    Surgery packet: Given in clinic     Additional comments: n/a

## 2022-04-25 ENCOUNTER — TELEPHONE (OUTPATIENT)
Dept: ORTHOPEDICS | Facility: CLINIC | Age: 22
End: 2022-04-25
Payer: COMMERCIAL

## 2022-04-25 NOTE — TELEPHONE ENCOUNTER
Patient mother, Aimee calling with several questions about Ivan's upcoming surgery.    Her first question is how long will the actual surgery take.  Writer relayed that Dr. Crane is planning for 180min procedure.    Her second question, if he has significant blood loss during the surgery - what the plan will be.  Writer relayed that he would likely need to give him blood.  We do type and screening prior to some shoulder surgeries and writer will clarify if that is needed prior to his surgery.    Lastly, they are wondering if there are any alternatives to try, other than surgery for Ivan's shoulder.  During their last conversation with Dr. Crane, they were told that it is a possibility that surgery may not help his shoulder problems and wanted to know if there is anything else they can try.    Writer will consult with Dr. Crane and relay any further recommendations.    Alyssa Izaguirre RN on 4/25/2022 at 2:23 PM

## 2022-04-26 NOTE — TELEPHONE ENCOUNTER
Consulted with Dr. Crane who reiterated that surgery may not help this patient and nothing that we have not tried that we could consider for alternatives.  Patient and family need to weigh the risks vs benefits of the surgery.  Writer let mother know that bleed risk is something the OR is prepared for all patients to take action if in the rare event this happens.    Aimee, patients mother verbalized understanding of the above and thanked writer for the information.    Alyssa Izaguirre RN on 4/26/2022 at 2:19 PM

## 2022-04-26 NOTE — TELEPHONE ENCOUNTER
Left VM with Aimee regarding answers to yesterdays questions.    Alyssa Izaguirre RN on 4/26/2022 at 9:48 AM

## 2022-06-07 ENCOUNTER — TELEPHONE (OUTPATIENT)
Dept: ORTHOPEDICS | Facility: CLINIC | Age: 22
End: 2022-06-07
Payer: COMMERCIAL

## 2022-06-07 NOTE — TELEPHONE ENCOUNTER
Attempted to reach patient to review preop teaching, no answer during phone call.  Writer left  with call back instructions.    Alyssa Izaguirre RN on 6/7/2022 at 10:01 AM

## 2022-06-21 ENCOUNTER — OFFICE VISIT (OUTPATIENT)
Dept: FAMILY MEDICINE | Facility: CLINIC | Age: 22
End: 2022-06-21
Payer: COMMERCIAL

## 2022-06-21 VITALS
OXYGEN SATURATION: 97 % | SYSTOLIC BLOOD PRESSURE: 118 MMHG | DIASTOLIC BLOOD PRESSURE: 72 MMHG | BODY MASS INDEX: 21.98 KG/M2 | TEMPERATURE: 98.7 F | WEIGHT: 157 LBS | RESPIRATION RATE: 19 BRPM | HEIGHT: 71 IN | HEART RATE: 78 BPM

## 2022-06-21 DIAGNOSIS — Z01.818 PREOP GENERAL PHYSICAL EXAM: Primary | ICD-10-CM

## 2022-06-21 DIAGNOSIS — M25.9 DISORDER OF STERNOCLAVICULAR JOINT: ICD-10-CM

## 2022-06-21 PROCEDURE — 99213 OFFICE O/P EST LOW 20 MIN: CPT | Performed by: FAMILY MEDICINE

## 2022-06-21 ASSESSMENT — PAIN SCALES - GENERAL: PAINLEVEL: SEVERE PAIN (6)

## 2022-06-21 ASSESSMENT — PATIENT HEALTH QUESTIONNAIRE - PHQ9
SUM OF ALL RESPONSES TO PHQ QUESTIONS 1-9: 9
10. IF YOU CHECKED OFF ANY PROBLEMS, HOW DIFFICULT HAVE THESE PROBLEMS MADE IT FOR YOU TO DO YOUR WORK, TAKE CARE OF THINGS AT HOME, OR GET ALONG WITH OTHER PEOPLE: SOMEWHAT DIFFICULT
SUM OF ALL RESPONSES TO PHQ QUESTIONS 1-9: 9

## 2022-06-21 NOTE — PATIENT INSTRUCTIONS
Preparing for Your Surgery  Getting started  A nurse will call you to review your health history and instructions. They will give you an arrival time based on your scheduled surgery time. Please be ready to share:  Your doctor's clinic name and phone number  Your medical, surgical and anesthesia history  A list of allergies and sensitivities  A list of medicines, including herbal treatments and over-the-counter drugs  Whether the patient has a legal guardian (ask how to send us the papers in advance)  Please tell us if you're pregnant--or if there's any chance you might be pregnant. Some surgeries may injure a fetus (unborn baby), so they require a pregnancy test. Surgeries that are safe for a fetus don't always need a test, and you can choose whether to have one.   If you have a child who's having surgery, please ask for a copy of Preparing for Your Child's Surgery.    Preparing for surgery  Within 30 days of surgery: Have a pre-op exam (sometimes called an H&P, or History and Physical). This can be done at a clinic or pre-operative center.  If you're having a , you may not need this exam. Talk to your care team.  At your pre-op exam, talk to your care team about all medicines you take. If you need to stop any medicines before surgery, ask when to start taking them again.  We do this for your safety. Many medicines can make you bleed too much during surgery. Some change how well surgery (anesthesia) drugs work.  Call your insurance company to let them know you're having surgery. (If you don't have insurance, call [phone].)  Call your clinic if there's any change in your health. This includes signs of a cold or flu (sore throat, runny nose, cough, rash, fever). It also includes a scrape or scratch near the surgery site.  If you have questions on the day of surgery, call your hospital or surgery center.  COVID testing  You may need to be tested for COVID-19 before having surgery. If so, we will give  you instructions.  Eating and drinking guidelines  For your safety: Unless your surgeon tells you otherwise, follow the guidelines below.  Eat and drink as usual until 8 hours before surgery. After that, no food or milk.  Drink clear liquids until 2 hours before surgery. These are liquids you can see through, like water, Gatorade and Propel Water. You may also have black coffee and tea (no cream or milk).  Nothing by mouth within 2 hours of surgery. This includes gum, candy and breath mints.  If you drink alcohol: Stop drinking it the night before surgery.  If your care team tells you to take medicine on the morning of surgery, it's okay to take it with a sip of water.  Preventing infection  Shower or bathe the night before and morning of your surgery. Follow the instructions your clinic gave you. (If no instructions, use regular soap.)  Don't shave or clip hair near your surgery site. We'll remove the hair if needed.  Don't smoke or vape the morning of surgery. You may chew nicotine gum up to 2 hours before surgery. A nicotine patch is okay.  Note: Some surgeries require you to completely quit smoking and nicotine. Check with your surgeon.  Your care team will make every effort to keep you safe from infection. We will:  Clean our hands often with soap and water (or an alcohol-based hand rub).  Clean the skin at your surgery site with a special soap that kills germs.  Give you a special gown to keep you warm. (Cold raises the risk of infection.)  Wear special hair covers, masks, gowns and gloves during surgery.  Give antibiotic medicine, if prescribed. Not all surgeries need antibiotics.  What to bring on the day of surgery  Photo ID and insurance card  Copy of your health care directive, if you have one  Glasses and hearing aides (bring cases)  You can't wear contacts during surgery  Inhaler and eye drops, if you use them (tell us about these when you arrive)  CPAP machine or breathing device, if you use them  A  few personal items, if spending the night  If you have . . .  A pacemaker, ICD (cardiac defibrillator) or other implant: Bring the ID card.  An implanted stimulator: Bring the remote control.  A legal guardian: Bring a copy of the certified (court-stamped) guardianship papers.  Please remove any jewelry, including body piercings. Leave jewelry and other valuables at home.  If you're going home the day of surgery  You must have a responsible adult drive you home. They should stay with you overnight as well.  If you don't have someone to stay with you, and you aren't safe to go home alone, we may keep you overnight. Insurance often won't pay for this.  After surgery  If it's hard to control your pain or you need more pain medicine, please call your surgeon's office.  Questions?   If you have any questions for your care team, list them here: __________________________ ____________________________________  For informational purposes only. Not to replace the advice of your health care provider. Copyright   2003, 2019 [facility]. All rights reserved. Clinically reviewed by Justyna Griffin MD. [employer] [id number] - REV 07/21.    How to Take Your Medication Before Surgery  - HOLD (do not take) NSAIDs for 5 days before surgery - ibuprofen, aspirin, naproxen  - Tylenol is safe to continue if needed for pain - OK to take to small sip of water on the AM of surgery if needed for pain  - STOP taking all vitamins and herbal supplements 7 days before surgery.

## 2022-06-21 NOTE — PROGRESS NOTES
[facility]  [address]  [facility] [address]  Phone: [phone]  Primary Provider: No Ref-Primary, Physician  Pre-op Performing Provider: LYNN TELLO      PREOPERATIVE EVALUATION:  Today's date: 6/21/2022    Ivan Guzman is a 22 year old male who presents for a preoperative evaluation.    Surgical Information:  Surgery/Procedure: right  Revision of resection of sternoclavicular joint with allograft reconstruction  Surgery Location: [facility]  Surgeon: Dr. Crane  Surgery Date: 6-27-22  Time of Surgery: 7:30am  Where patient plans to recover: At home with family  Fax number for surgical facility: Note does not need to be faxed, will be available electronically in Epic.    Type of Anesthesia Anticipated: Choice    Assessment & Plan     The proposed surgical procedure is considered LOW to INTERMEDIATE risk.    Preop general physical exam  Disorder of sternoclavicular joint, Right    Risks and Recommendations:  The patient has the following additional risks and recommendations for perioperative complications:   - No identified additional risk factors other than previously addressed    Medication Instructions:  - HOLD (do not take) NSAIDs for 5 days before surgery - ibuprofen, aspirin, naproxen  - Tylenol is safe to continue if needed for pain - OK to take to small sip of water on the AM of surgery if needed for pain  - STOP taking all vitamins and herbal supplements 7 days before surgery.     RECOMMENDATION:  APPROVAL GIVEN to proceed with proposed procedure, without further diagnostic evaluation.    Lynn Tello MD  Family Medicine  [facility]          Subjective     HPI related to upcoming procedure: R sternoclavicular joint dysfunction s/p repair in 2019 needing revision    Preop Questions 6/21/2022   1. Have you ever had a heart attack or stroke? No   2. Have you ever had surgery on your heart or blood vessels, such as a stent placement, a  coronary artery bypass, or surgery on an artery in your head, neck, heart, or legs? No   3. Do you have chest pain with activity? No   4. Do you have a history of  heart failure? No   5. Do you currently have a cold, bronchitis or symptoms of other infection? No   6. Do you have a cough, shortness of breath, or wheezing? No   7. Do you or anyone in your family have previous history of blood clots? No   8. Do you or does anyone in your family have a serious bleeding problem such as prolonged bleeding following surgeries or cuts? No   9. Have you ever had problems with anemia or been told to take iron pills? No   10. Have you had any abnormal blood loss such as black, tarry or bloody stools? No   11. Have you ever had a blood transfusion? No   12. Are you willing to have a blood transfusion if it is medically needed before, during, or after your surgery? Yes   13. Have you or any of your relatives ever had problems with anesthesia? No   14. Do you have sleep apnea, excessive snoring or daytime drowsiness? No   15. Do you have any artifical heart valves or other implanted medical devices like a pacemaker, defibrillator, or continuous glucose monitor? No   16. Do you have artificial joints? No   17. Are you allergic to latex? No       Health Care Directive:  Patient does not have a Health Care Directive or Living Will: Discussed advance care planning with patient; information given to patient to review.    Preoperative Review of :   reviewed - controlled substances reflected in medication list.- few small opioid prescriptions recently for this issue    Review of Systems  CONSTITUTIONAL: NEGATIVE for fever, chills, change in weight  INTEGUMENTARY/SKIN: NEGATIVE for worrisome rashes, moles or lesions  EYES: NEGATIVE for vision changes or irritation  ENT/MOUTH: NEGATIVE for ear, mouth and throat problems  RESP: NEGATIVE for significant cough or SOB  CV: NEGATIVE for chest pain, palpitations or peripheral edema  GI:  NEGATIVE for nausea, abdominal pain, heartburn, or change in bowel habits  : NEGATIVE for frequency, dysuria, or hematuria  MUSCULOSKELETAL: NEGATIVE for significant arthralgias or myalgia  NEURO: NEGATIVE for weakness, dizziness or paresthesias  ENDOCRINE: NEGATIVE for temperature intolerance, skin/hair changes  HEME: NEGATIVE for bleeding problems  PSYCHIATRIC: NEGATIVE for changes in mood or affect    Patient Active Problem List    Diagnosis Date Noted     Depression, unspecified depression type 12/11/2017     Priority: Medium     Anxiety 04/13/2015     Priority: Medium     CARDIOVASCULAR SCREENING; LDL GOAL LESS THAN 160 12/14/2012     Priority: Medium      Past Medical History:   Diagnosis Date     Chest pain      Past Surgical History:   Procedure Laterality Date     GENITOURINARY SURGERY       none       ORCHIECTOMY SCROTAL  3/6/2014    Procedure: ORCHIECTOMY SCROTAL;  Scrotal Exploration,Left Orchidopexy, ,Possible Right Orchiectomy;  Surgeon: Nazario Alvarado MD;  Location: [facility]     STERNOPLASTY Right 9/27/2019    Procedure: Right Sternoclavicular Joint Reconstruction With Allograft;  Surgeon: Luca Crane MD;  Location: [facility] OR     Current Outpatient Medications   Medication Sig Dispense Refill     ibuprofen (ADVIL/MOTRIN) 800 MG tablet Take 1 tablet (800 mg) by mouth 3 times daily (with meals) 42 tablet 0     oxyCODONE (ROXICODONE) 5 MG tablet Take 1 tablet (5 mg) by mouth every 6 hours as needed for severe pain 15 tablet 0     gabapentin (NEURONTIN) 300 MG capsule Take 1 capsule (300 mg) by mouth At Bedtime (Patient not taking: Reported on 6/21/2022) 30 capsule 0     hydrOXYzine (VISTARIL) 25 MG capsule Take 25 mg by mouth 3 times daily as needed for itching (Patient not taking: Reported on 6/21/2022)         Allergies   Allergen Reactions     Nka [No Known Allergies]         Social History     Tobacco Use     Smoking status: Never Smoker     Smokeless tobacco: Never Used  "  Substance Use Topics     Alcohol use: No     History   Drug Use No         Objective     /72   Pulse 78   Temp 98.7  F (37.1  C)   Resp 19   Ht 1.803 m (5' 11\")   Wt 71.2 kg (157 lb)   SpO2 97%   BMI 21.90 kg/m      Physical Exam    GENERAL APPEARANCE: healthy, alert and no distress     EYES: EOMI,  PERRL     HENT: ear canals and TM's normal and nose and mouth without ulcers or lesions     NECK: no adenopathy, no asymmetry, masses, or scars and thyroid normal to palpation     RESP: lungs clear to auscultation - no rales, rhonchi or wheezes     CV: regular rates and rhythm, normal S1 S2, no S3 or S4 and no murmur, click or rub     ABDOMEN:  soft, nontender, no HSM or masses and bowel sounds normal     MS: extremities normal- no gross deformities noted, no evidence of inflammation in joints, FROM in all extremities.     SKIN: no suspicious lesions or rashes     NEURO: Normal strength and tone, sensory exam grossly normal, mentation intact and speech normal     PSYCH: mentation appears normal. and affect normal/bright     LYMPHATICS: No cervical adenopathy    No results for input(s): HGB, PLT, INR, NA, POTASSIUM, CR, A1C in the last 41913 hours.     Diagnostics:  No labs were ordered during this visit.   No EKG required, no history of coronary heart disease, significant arrhythmia, peripheral arterial disease or other structural heart disease.    Revised Cardiac Risk Index (RCRI):  The patient has the following serious cardiovascular risks for perioperative complications:   - No serious cardiac risks = 0 points     RCRI Interpretation: 0 points: Class I (very low risk - 0.4% complication rate)           Signed Electronically by: Lynn Coleman MD  Copy of this evaluation report is provided to requesting physician.    {  Answers for HPI/ROS submitted by the patient on 6/21/2022  If you checked off any problems, how difficult have these problems made it for you to do your work, take care of things at home, " or get along with other people?: Somewhat difficult  PHQ9 TOTAL SCORE: 9

## 2022-06-21 NOTE — TELEPHONE ENCOUNTER
DIAGNOSIS: Pre-op discussion   DATE RECEIVED: 6.23.22   NOTES STATUS DETAILS   OFFICE NOTE from referring provider Internal 4.18.22, 2.7.22  [place]  Or[facility]   OFFICE NOTE from other specialist Internal 3.4.22  [facility]   MEDICATION LIST Internal    PERTINENT LABS Internal    CTA (CT ANGIOGRAPHY) Internal 2.8.22  [facility] Chest

## 2022-06-22 ENCOUNTER — VIRTUAL VISIT (OUTPATIENT)
Dept: VASCULAR SURGERY | Facility: CLINIC | Age: 22
End: 2022-06-22
Payer: COMMERCIAL

## 2022-06-22 DIAGNOSIS — M25.511 PAIN OF RIGHT STERNOCLAVICULAR JOINT: Primary | ICD-10-CM

## 2022-06-22 PROCEDURE — 99205 OFFICE O/P NEW HI 60 MIN: CPT | Mod: 95 | Performed by: SURGERY

## 2022-06-22 RX ORDER — CEFAZOLIN SODIUM 2 G/50ML
2 SOLUTION INTRAVENOUS
Status: CANCELLED | OUTPATIENT
Start: 2022-06-22

## 2022-06-22 RX ORDER — CEFAZOLIN SODIUM 2 G/50ML
2 SOLUTION INTRAVENOUS SEE ADMIN INSTRUCTIONS
Status: CANCELLED | OUTPATIENT
Start: 2022-06-22

## 2022-06-22 NOTE — LETTER
6/22/2022       RE: Ivan Guzman  [address] [facility] [address]     Dear Colleague,    Thank you for referring your patient, Ivan Guzman, to the [facility] [place] [facility]. Please see a copy of my visit note below.    Vascular Surgery Consultation Note     Patient:  Ivan Guzman   Date of birth 2000, Medical record number [id number]  Date of Visit:  06/22/2022  Consult Requester:No att. providers found          Assessment and Recommendations:   ASSESSMENT / RECOMMENDATION:  Very pleasant 22-year-old male with right sternoclavicular pain from multiple subluxations in the past with plan fixation with Dr. Crane from orthopedic surgery.  I explained to Ivan that as a vascular surgeon I will be providing balloon control of the right subclavian vein and possibly the superior vena cava.  We went through the various points of my involvement and the idea of hopefully avoiding a larger incision-sternotomy/thoracotomy- by achieving balloon control.  I explained I would be getting access into a vein in the right arm and being available at the time of the joint stabilization to inflate a balloon into the vein to control any bleeding while it might need to be repaired by Dr. Bui of cardiac surgery.  I discussed there may be a possibility that clotting could occur as well.  He is planned right now as an outpatient unless he needs to be observed overnight if any significant bleeding.  I encouraged him to reach out if any additional questions arise.  He feels he has a good understanding of what we are planning.    Many thanks for involving me in the care of this very pleasant patient. Should any questions or concerns arise, please don't hesitate to contact me.    Warm Regards,    Brittany Anderson MD, DFSVS, RPVI  Director, [facility] Vascular Services  Professor and Chief, Vascular and Endovascular Surgery  [facility]  [place]  Pager: 1. Send message or 10 digit call back number Securely via Prylos with the Prylos Web Console (learn more here)              2. Outside of [facility]? Call [phone]      60 minutes spent on the date of the encounter doing chart review, review of outside records, review of test results, interpretation of tests, patient visit, documentation and discussion with other provider(s)       HPI: Ivan is a very pleasant 22-year-old male being seen today to discuss my involvement in his upcoming surgery with Dr. Crane of Orthopedics who is planning repeat intervention on the note was right sternoclavicular joint which was reconstructed with allograft semitendinosus on September 27, 2019.  There has been recurrent pain which did not respond to steroid injections and reintervention is being planned for Monday, June 27.  On CT scan it appeared that the proximal aspect of the right subclavian vein and proximal superior vena cava were closely adjacent to this area.  Dr. Tracie Bui of cardiac surgery was consulted for possible need for thoracotomy and repair of the vein.    Dr Crane, Dr Bui, and I met via conference call approximately 2 weeks ago and reviewed the images and our plan.  I will be gaining percutaneous access into the right subclavian vein via a right upper extremity approach through the basilic vein via fluoroscopy and have wire and balloon access ready at the time of the joint stabilization.      Review of Systems   Constitutional, HEENT, cardiovascular, pulmonary, GI, , musculoskeletal, neuro, skin, endocrine and psych systems are negative, except as otherwise noted.    Physical Exam   GENERAL: Healthy, alert and no distress  RESP: No audible wheeze, cough, or visible cyanosis.  No visible retractions or increased work of breathing.    NEURO: Cranial nerves grossly intact.  Mentation and speech appropriate for age.  PSYCH: Mentation appears normal, affect normal/bright, judgement and  insight intact, normal speech and appearance well-groomed.    Imaging: CT scan reviewed of the right sternoclavicular joint with the associated subclavian vein immediately adjacent to it.    Video Start Time: 2 PM  Video End Time: 2:20 PM       Brittany Anderson MD

## 2022-06-22 NOTE — NURSING NOTE
Chief Complaint   Patient presents with     Follow Up     Preop discussion.          Patient confirms medications and allergies are accurate via patients echeck in completion, and or denies any changes since last reviewed/verified.     Ramya Arevalo, Virtual Facilitator

## 2022-06-22 NOTE — PROGRESS NOTES
Vascular Surgery Consultation Note     Patient:  Ivan Guzman   Date of birth 2000, Medical record number [id number]  Date of Visit:  06/22/2022  Consult Requester:No att. providers found            Assessment and Recommendations:   ASSESSMENT / RECOMMENDATION:    Very pleasant 22-year-old male with right sternoclavicular pain from multiple subluxations in the past with plan fixation with Dr. Crane from orthopedic surgery.  I explained to Ivan that as a vascular surgeon I will be providing balloon control of the right subclavian vein and possibly the superior vena cava.  We went through the various points of my involvement and the idea of hopefully avoiding a larger incision-sternotomy/thoracotomy- by achieving balloon control.  I explained I would be getting access into a vein in the right arm and being available at the time of the joint stabilization to inflate a balloon into the vein to control any bleeding while it might need to be repaired by Dr. Bui of cardiac surgery.  I discussed there may be a possibility that clotting could occur as well.  He is planned right now as an outpatient unless he needs to be observed overnight if any significant bleeding.  I encouraged him to reach out if any additional questions arise.  He feels he has a good understanding of what we are planning.    Many thanks for involving me in the care of this very pleasant patient. Should any questions or concerns arise, please don't hesitate to contact me.    Warm Regards,    Brittany Anderson MD, DFSVS, RPVI  Director, [facility]  Professor and Chief, Vascular and Endovascular Surgery  [facility]  Pager: 1. Send message or 10 digit call back number Securely via [employer] with the [employer] Web Console (learn more here)              2. Outside of [facility]? Call [phone]        60 minutes spent on the date of the encounter doing chart review, review of outside records, review of test  results, interpretation of tests, patient visit, documentation and discussion with other provider(s)           HPI: Ivan is a very pleasant 22-year-old male being seen today to discuss my involvement in his upcoming surgery with Dr. Crane of Orthopedics who is planning repeat intervention on the note was right sternoclavicular joint which was reconstructed with allograft semitendinosus on September 27, 2019.  There has been recurrent pain which did not respond to steroid injections and reintervention is being planned for Monday, June 27.  On CT scan it appeared that the proximal aspect of the right subclavian vein and proximal superior vena cava were closely adjacent to this area.  Dr. Tracie Bui of cardiac surgery was consulted for possible need for thoracotomy and repair of the vein.    Dr Crane, Dr Bui, and I met via conference call approximately 2 weeks ago and reviewed the images and our plan.  I will be gaining percutaneous access into the right subclavian vein via a right upper extremity approach through the basilic vein via fluoroscopy and have wire and balloon access ready at the time of the joint stabilization.        Review of Systems   Constitutional, HEENT, cardiovascular, pulmonary, GI, , musculoskeletal, neuro, skin, endocrine and psych systems are negative, except as otherwise noted.    Physical Exam   GENERAL: Healthy, alert and no distress  RESP: No audible wheeze, cough, or visible cyanosis.  No visible retractions or increased work of breathing.    NEURO: Cranial nerves grossly intact.  Mentation and speech appropriate for age.  PSYCH: Mentation appears normal, affect normal/bright, judgement and insight intact, normal speech and appearance well-groomed.    Imaging: CT scan reviewed of the right sternoclavicular joint with the associated subclavian vein immediately adjacent to it.    Video Start Time: 2 PM  Video End Time: 2:20 PM           Ivan is a 22 year old who is being evaluated via a  billable video visit.      How would you like to obtain your AVS? MyChart  If the video visit is dropped, the invitation should be resent by: Text to cell phone: [phone]  Will anyone else be joining your video visit? No     Patient states is currently in the [facility]: Yes      Darlyn Jacques/CESAR, 6/22/2022     Originating Location (pt. Location): Home    Distant Location (provider location):  [facility] [place]     Platform used for Video Visit: [employer]    .  ..

## 2022-06-23 ENCOUNTER — PRE VISIT (OUTPATIENT)
Dept: VASCULAR SURGERY | Facility: CLINIC | Age: 22
End: 2022-06-23

## 2022-06-23 ENCOUNTER — PREP FOR PROCEDURE (OUTPATIENT)
Dept: VASCULAR SURGERY | Facility: CLINIC | Age: 22
End: 2022-06-23

## 2022-06-23 ENCOUNTER — ANESTHESIA EVENT (OUTPATIENT)
Dept: SURGERY | Facility: CLINIC | Age: 22
End: 2022-06-23
Payer: COMMERCIAL

## 2022-06-23 ENCOUNTER — LAB (OUTPATIENT)
Dept: LAB | Facility: CLINIC | Age: 22
End: 2022-06-23
Payer: COMMERCIAL

## 2022-06-23 ENCOUNTER — PREP FOR PROCEDURE (OUTPATIENT)
Dept: CARDIOLOGY | Facility: CLINIC | Age: 22
End: 2022-06-23

## 2022-06-23 DIAGNOSIS — Z11.52 ENCOUNTER FOR PREOPERATIVE SCREENING LABORATORY TESTING FOR COVID-19 VIRUS: Primary | ICD-10-CM

## 2022-06-23 DIAGNOSIS — Z01.812 ENCOUNTER FOR PREOPERATIVE SCREENING LABORATORY TESTING FOR COVID-19 VIRUS: Primary | ICD-10-CM

## 2022-06-23 PROCEDURE — U0005 INFEC AGEN DETEC AMPLI PROBE: HCPCS

## 2022-06-23 PROCEDURE — U0003 INFECTIOUS AGENT DETECTION BY NUCLEIC ACID (DNA OR RNA); SEVERE ACUTE RESPIRATORY SYNDROME CORONAVIRUS 2 (SARS-COV-2) (CORONAVIRUS DISEASE [COVID-19]), AMPLIFIED PROBE TECHNIQUE, MAKING USE OF HIGH THROUGHPUT TECHNOLOGIES AS DESCRIBED BY CMS-2020-01-R: HCPCS

## 2022-06-24 LAB — SARS-COV-2 RNA RESP QL NAA+PROBE: NEGATIVE

## 2022-06-27 ENCOUNTER — APPOINTMENT (OUTPATIENT)
Dept: INTERVENTIONAL RADIOLOGY/VASCULAR | Facility: CLINIC | Age: 22
End: 2022-06-27
Attending: SURGERY
Payer: COMMERCIAL

## 2022-06-27 ENCOUNTER — ANESTHESIA (OUTPATIENT)
Dept: SURGERY | Facility: CLINIC | Age: 22
End: 2022-06-27
Payer: COMMERCIAL

## 2022-06-27 ENCOUNTER — HOSPITAL ENCOUNTER (OUTPATIENT)
Facility: CLINIC | Age: 22
Discharge: HOME | End: 2022-06-27
Attending: ORTHOPAEDIC SURGERY | Admitting: ORTHOPAEDIC SURGERY
Payer: COMMERCIAL

## 2022-06-27 VITALS
RESPIRATION RATE: 14 BRPM | OXYGEN SATURATION: 97 % | DIASTOLIC BLOOD PRESSURE: 81 MMHG | WEIGHT: 159.61 LBS | BODY MASS INDEX: 22.35 KG/M2 | SYSTOLIC BLOOD PRESSURE: 122 MMHG | TEMPERATURE: 97.9 F | HEART RATE: 90 BPM | HEIGHT: 71 IN

## 2022-06-27 DIAGNOSIS — M25.511 PAIN OF RIGHT STERNOCLAVICULAR JOINT: ICD-10-CM

## 2022-06-27 LAB
ABO/RH(D): NORMAL
ANTIBODY SCREEN: NEGATIVE
BASE EXCESS BLDA CALC-SCNC: 2.1 MMOL/L (ref -9.6–2)
CA-I BLD-MCNC: 4.7 MG/DL (ref 4.4–5.2)
GLUCOSE BLD-MCNC: 93 MG/DL (ref 70–99)
GLUCOSE BLDC GLUCOMTR-MCNC: 106 MG/DL (ref 70–99)
HCO3 BLDA-SCNC: 25 MMOL/L (ref 21–28)
HGB BLD-MCNC: 13.6 G/DL (ref 13.3–17.7)
HGB BLD-MCNC: 15.1 G/DL (ref 13.3–17.7)
LACTATE BLD-SCNC: 1.1 MMOL/L
O2/TOTAL GAS SETTING VFR VENT: 50 %
OXYHGB MFR BLDA: 99 % (ref 92–100)
PCO2 BLDA: 34 MM HG (ref 35–45)
PH BLDA: 7.48 [PH] (ref 7.35–7.45)
PLATELET # BLD AUTO: 255 10E3/UL (ref 150–450)
PO2 BLDA: 256 MM HG (ref 80–105)
POTASSIUM BLD-SCNC: 3.7 MMOL/L (ref 3.5–5)
SODIUM BLD-SCNC: 139 MMOL/L (ref 133–144)
SPECIMEN EXPIRATION DATE: NORMAL

## 2022-06-27 PROCEDURE — 250N000009 HC RX 250: Performed by: ANESTHESIOLOGY

## 2022-06-27 PROCEDURE — 86850 RBC ANTIBODY SCREEN: CPT | Performed by: ANESTHESIOLOGY

## 2022-06-27 PROCEDURE — 36010 PLACE CATHETER IN VEIN: CPT | Mod: RT | Performed by: SURGERY

## 2022-06-27 PROCEDURE — 84132 ASSAY OF SERUM POTASSIUM: CPT

## 2022-06-27 PROCEDURE — 999N000141 HC STATISTIC PRE-PROCEDURE NURSING ASSESSMENT: Performed by: ORTHOPAEDIC SURGERY

## 2022-06-27 PROCEDURE — 85049 AUTOMATED PLATELET COUNT: CPT | Performed by: ANESTHESIOLOGY

## 2022-06-27 PROCEDURE — C1887 CATHETER, GUIDING: HCPCS | Performed by: ORTHOPAEDIC SURGERY

## 2022-06-27 PROCEDURE — 999N000083 HC STATISTIC IR STAFF TIME IN THE OR

## 2022-06-27 PROCEDURE — 370N000017 HC ANESTHESIA TECHNICAL FEE, PER MIN: Performed by: ORTHOPAEDIC SURGERY

## 2022-06-27 PROCEDURE — 410N000003 HC PER-PERFUSION 1ST 30 MIN: Performed by: ORTHOPAEDIC SURGERY

## 2022-06-27 PROCEDURE — C1762 CONN TISS, HUMAN(INC FASCIA): HCPCS | Performed by: ORTHOPAEDIC SURGERY

## 2022-06-27 PROCEDURE — 250N000013 HC RX MED GY IP 250 OP 250 PS 637: Performed by: ANESTHESIOLOGY

## 2022-06-27 PROCEDURE — C1769 GUIDE WIRE: HCPCS | Performed by: ORTHOPAEDIC SURGERY

## 2022-06-27 PROCEDURE — 36415 COLL VENOUS BLD VENIPUNCTURE: CPT | Performed by: ANESTHESIOLOGY

## 2022-06-27 PROCEDURE — 250N000011 HC RX IP 250 OP 636: Performed by: ANESTHESIOLOGY

## 2022-06-27 PROCEDURE — 250N000009 HC RX 250: Performed by: ORTHOPAEDIC SURGERY

## 2022-06-27 PROCEDURE — 710N000012 HC RECOVERY PHASE 2, PER MINUTE: Performed by: ORTHOPAEDIC SURGERY

## 2022-06-27 PROCEDURE — 82810 BLOOD GASES O2 SAT ONLY: CPT

## 2022-06-27 PROCEDURE — 76998 US GUIDE INTRAOP: CPT | Mod: 26 | Performed by: SURGERY

## 2022-06-27 PROCEDURE — 250N000011 HC RX IP 250 OP 636

## 2022-06-27 PROCEDURE — 85018 HEMOGLOBIN: CPT | Performed by: ANESTHESIOLOGY

## 2022-06-27 PROCEDURE — 360N000084 HC SURGERY LEVEL 4 W/ FLUORO, PER MIN: Performed by: ORTHOPAEDIC SURGERY

## 2022-06-27 PROCEDURE — 82962 GLUCOSE BLOOD TEST: CPT

## 2022-06-27 PROCEDURE — 710N000010 HC RECOVERY PHASE 1, LEVEL 2, PER MIN: Performed by: ORTHOPAEDIC SURGERY

## 2022-06-27 PROCEDURE — 272N000001 HC OR GENERAL SUPPLY STERILE: Performed by: ORTHOPAEDIC SURGERY

## 2022-06-27 PROCEDURE — L3675 SO VEST CANVAS/WEB PRE OTS: HCPCS

## 2022-06-27 PROCEDURE — 250N000009 HC RX 250

## 2022-06-27 PROCEDURE — 250N000013 HC RX MED GY IP 250 OP 250 PS 637

## 2022-06-27 PROCEDURE — 258N000003 HC RX IP 258 OP 636

## 2022-06-27 PROCEDURE — C1894 INTRO/SHEATH, NON-LASER: HCPCS | Performed by: ORTHOPAEDIC SURGERY

## 2022-06-27 PROCEDURE — 23530 OPTX STRNCLAV DISLC AQT/CHRN: CPT | Mod: RT | Performed by: ORTHOPAEDIC SURGERY

## 2022-06-27 PROCEDURE — 250N000025 HC SEVOFLURANE, PER MIN: Performed by: ORTHOPAEDIC SURGERY

## 2022-06-27 RX ORDER — SODIUM CHLORIDE, SODIUM LACTATE, POTASSIUM CHLORIDE, CALCIUM CHLORIDE 600; 310; 30; 20 MG/100ML; MG/100ML; MG/100ML; MG/100ML
INJECTION, SOLUTION INTRAVENOUS CONTINUOUS PRN
Status: DISCONTINUED | OUTPATIENT
Start: 2022-06-27 | End: 2022-06-27

## 2022-06-27 RX ORDER — TRANEXAMIC ACID 650 MG/1
1950 TABLET ORAL ONCE
Status: COMPLETED | OUTPATIENT
Start: 2022-06-27 | End: 2022-06-27

## 2022-06-27 RX ORDER — HYDROMORPHONE HCL IN WATER/PF 6 MG/30 ML
0.2 PATIENT CONTROLLED ANALGESIA SYRINGE INTRAVENOUS EVERY 5 MIN PRN
Status: DISCONTINUED | OUTPATIENT
Start: 2022-06-27 | End: 2022-06-27 | Stop reason: HOSPADM

## 2022-06-27 RX ORDER — SODIUM CHLORIDE, SODIUM LACTATE, POTASSIUM CHLORIDE, CALCIUM CHLORIDE 600; 310; 30; 20 MG/100ML; MG/100ML; MG/100ML; MG/100ML
INJECTION, SOLUTION INTRAVENOUS CONTINUOUS
Status: DISCONTINUED | OUTPATIENT
Start: 2022-06-27 | End: 2022-06-27 | Stop reason: HOSPADM

## 2022-06-27 RX ORDER — ONDANSETRON 4 MG/1
4 TABLET, ORALLY DISINTEGRATING ORAL EVERY 30 MIN PRN
Status: DISCONTINUED | OUTPATIENT
Start: 2022-06-27 | End: 2022-06-27 | Stop reason: HOSPADM

## 2022-06-27 RX ORDER — CEFAZOLIN SODIUM/WATER 2 G/20 ML
2 SYRINGE (ML) INTRAVENOUS
Status: DISCONTINUED | OUTPATIENT
Start: 2022-06-27 | End: 2022-06-27 | Stop reason: HOSPADM

## 2022-06-27 RX ORDER — PHENYLEPHRINE HCL IN 0.9% NACL 50MG/250ML
.5-1.25 PLASTIC BAG, INJECTION (ML) INTRAVENOUS CONTINUOUS
Status: DISCONTINUED | OUTPATIENT
Start: 2022-06-27 | End: 2022-06-27 | Stop reason: HOSPADM

## 2022-06-27 RX ORDER — LIDOCAINE 40 MG/G
CREAM TOPICAL
Status: DISCONTINUED | OUTPATIENT
Start: 2022-06-27 | End: 2022-06-27 | Stop reason: HOSPADM

## 2022-06-27 RX ORDER — ACETAMINOPHEN 325 MG/1
650 TABLET ORAL EVERY 4 HOURS PRN
Qty: 50 TABLET | Refills: 0 | Status: SHIPPED | OUTPATIENT
Start: 2022-06-27 | End: 2023-01-31

## 2022-06-27 RX ORDER — NALOXONE HYDROCHLORIDE 0.4 MG/ML
0.4 INJECTION, SOLUTION INTRAMUSCULAR; INTRAVENOUS; SUBCUTANEOUS
Status: DISCONTINUED | OUTPATIENT
Start: 2022-06-27 | End: 2022-06-27 | Stop reason: HOSPADM

## 2022-06-27 RX ORDER — CEFAZOLIN SODIUM/WATER 2 G/20 ML
2 SYRINGE (ML) INTRAVENOUS SEE ADMIN INSTRUCTIONS
Status: DISCONTINUED | OUTPATIENT
Start: 2022-06-27 | End: 2022-06-27 | Stop reason: HOSPADM

## 2022-06-27 RX ORDER — BUPIVACAINE HYDROCHLORIDE 5 MG/ML
INJECTION, SOLUTION PERINEURAL PRN
Status: DISCONTINUED | OUTPATIENT
Start: 2022-06-27 | End: 2022-06-27 | Stop reason: HOSPADM

## 2022-06-27 RX ORDER — LIDOCAINE HYDROCHLORIDE 20 MG/ML
INJECTION, SOLUTION INFILTRATION; PERINEURAL PRN
Status: DISCONTINUED | OUTPATIENT
Start: 2022-06-27 | End: 2022-06-27

## 2022-06-27 RX ORDER — CEFAZOLIN SODIUM/WATER 2 G/20 ML
2 SYRINGE (ML) INTRAVENOUS
Status: COMPLETED | OUTPATIENT
Start: 2022-06-27 | End: 2022-06-27

## 2022-06-27 RX ORDER — ONDANSETRON 2 MG/ML
INJECTION INTRAMUSCULAR; INTRAVENOUS PRN
Status: DISCONTINUED | OUTPATIENT
Start: 2022-06-27 | End: 2022-06-27

## 2022-06-27 RX ORDER — ONDANSETRON 4 MG/1
4 TABLET, ORALLY DISINTEGRATING ORAL EVERY 8 HOURS PRN
Qty: 4 TABLET | Refills: 0 | Status: SHIPPED | OUTPATIENT
Start: 2022-06-27 | End: 2022-08-01

## 2022-06-27 RX ORDER — NALOXONE HYDROCHLORIDE 0.4 MG/ML
0.2 INJECTION, SOLUTION INTRAMUSCULAR; INTRAVENOUS; SUBCUTANEOUS
Status: DISCONTINUED | OUTPATIENT
Start: 2022-06-27 | End: 2022-06-27 | Stop reason: HOSPADM

## 2022-06-27 RX ORDER — FLUMAZENIL 0.1 MG/ML
0.2 INJECTION, SOLUTION INTRAVENOUS
Status: DISCONTINUED | OUTPATIENT
Start: 2022-06-27 | End: 2022-06-27 | Stop reason: HOSPADM

## 2022-06-27 RX ORDER — PROPOFOL 10 MG/ML
INJECTION, EMULSION INTRAVENOUS PRN
Status: DISCONTINUED | OUTPATIENT
Start: 2022-06-27 | End: 2022-06-27

## 2022-06-27 RX ORDER — FENTANYL CITRATE 50 UG/ML
25-50 INJECTION, SOLUTION INTRAMUSCULAR; INTRAVENOUS
Status: DISCONTINUED | OUTPATIENT
Start: 2022-06-27 | End: 2022-06-27 | Stop reason: HOSPADM

## 2022-06-27 RX ORDER — ACETAMINOPHEN 325 MG/1
975 TABLET ORAL ONCE
Status: COMPLETED | OUTPATIENT
Start: 2022-06-27 | End: 2022-06-27

## 2022-06-27 RX ORDER — ONDANSETRON 2 MG/ML
4 INJECTION INTRAMUSCULAR; INTRAVENOUS EVERY 30 MIN PRN
Status: DISCONTINUED | OUTPATIENT
Start: 2022-06-27 | End: 2022-06-27 | Stop reason: HOSPADM

## 2022-06-27 RX ORDER — AMOXICILLIN 250 MG
1-2 CAPSULE ORAL 2 TIMES DAILY
Qty: 30 TABLET | Refills: 0 | Status: SHIPPED | OUTPATIENT
Start: 2022-06-27 | End: 2023-01-31

## 2022-06-27 RX ORDER — METHOCARBAMOL 500 MG/1
500 TABLET, FILM COATED ORAL 4 TIMES DAILY PRN
Qty: 30 TABLET | Refills: 0 | Status: SHIPPED | OUTPATIENT
Start: 2022-06-27 | End: 2022-08-01

## 2022-06-27 RX ORDER — OXYCODONE HYDROCHLORIDE 5 MG/1
5 TABLET ORAL EVERY 4 HOURS PRN
Qty: 30 TABLET | Refills: 0 | Status: SHIPPED | OUTPATIENT
Start: 2022-06-27 | End: 2022-06-27

## 2022-06-27 RX ORDER — EPHEDRINE SULFATE 50 MG/ML
INJECTION, SOLUTION INTRAMUSCULAR; INTRAVENOUS; SUBCUTANEOUS PRN
Status: DISCONTINUED | OUTPATIENT
Start: 2022-06-27 | End: 2022-06-27

## 2022-06-27 RX ORDER — FENTANYL CITRATE 50 UG/ML
INJECTION, SOLUTION INTRAMUSCULAR; INTRAVENOUS PRN
Status: DISCONTINUED | OUTPATIENT
Start: 2022-06-27 | End: 2022-06-27

## 2022-06-27 RX ORDER — FENTANYL CITRATE 50 UG/ML
25 INJECTION, SOLUTION INTRAMUSCULAR; INTRAVENOUS EVERY 5 MIN PRN
Status: DISCONTINUED | OUTPATIENT
Start: 2022-06-27 | End: 2022-06-27 | Stop reason: HOSPADM

## 2022-06-27 RX ORDER — OXYCODONE HYDROCHLORIDE 5 MG/1
5 TABLET ORAL EVERY 4 HOURS PRN
Status: DISCONTINUED | OUTPATIENT
Start: 2022-06-27 | End: 2022-06-27 | Stop reason: HOSPADM

## 2022-06-27 RX ORDER — OXYCODONE HYDROCHLORIDE 5 MG/1
5 TABLET ORAL EVERY 4 HOURS PRN
Qty: 30 TABLET | Refills: 0 | Status: SHIPPED | OUTPATIENT
Start: 2022-06-27 | End: 2023-01-31

## 2022-06-27 RX ORDER — DEXAMETHASONE SODIUM PHOSPHATE 4 MG/ML
INJECTION, SOLUTION INTRA-ARTICULAR; INTRALESIONAL; INTRAMUSCULAR; INTRAVENOUS; SOFT TISSUE PRN
Status: DISCONTINUED | OUTPATIENT
Start: 2022-06-27 | End: 2022-06-27

## 2022-06-27 RX ADMIN — Medication 30 MG: at 10:40

## 2022-06-27 RX ADMIN — LIDOCAINE HYDROCHLORIDE 100 MG: 20 INJECTION, SOLUTION INFILTRATION; PERINEURAL at 09:52

## 2022-06-27 RX ADMIN — SODIUM CHLORIDE, POTASSIUM CHLORIDE, SODIUM LACTATE AND CALCIUM CHLORIDE: 600; 310; 30; 20 INJECTION, SOLUTION INTRAVENOUS at 11:00

## 2022-06-27 RX ADMIN — OXYCODONE HYDROCHLORIDE 5 MG: 5 TABLET ORAL at 14:41

## 2022-06-27 RX ADMIN — Medication 10 MG: at 11:03

## 2022-06-27 RX ADMIN — Medication 2 G: at 09:55

## 2022-06-27 RX ADMIN — ACETAMINOPHEN 975 MG: 325 TABLET, FILM COATED ORAL at 08:15

## 2022-06-27 RX ADMIN — PHENYLEPHRINE HYDROCHLORIDE 100 MCG: 10 INJECTION INTRAVENOUS at 10:06

## 2022-06-27 RX ADMIN — ONDANSETRON 4 MG: 2 INJECTION INTRAMUSCULAR; INTRAVENOUS at 12:23

## 2022-06-27 RX ADMIN — FENTANYL CITRATE 50 MCG: 50 INJECTION, SOLUTION INTRAMUSCULAR; INTRAVENOUS at 12:07

## 2022-06-27 RX ADMIN — FENTANYL CITRATE 50 MCG: 50 INJECTION, SOLUTION INTRAMUSCULAR; INTRAVENOUS at 11:04

## 2022-06-27 RX ADMIN — FENTANYL CITRATE 200 MCG: 50 INJECTION, SOLUTION INTRAMUSCULAR; INTRAVENOUS at 09:52

## 2022-06-27 RX ADMIN — Medication 10 MG: at 11:24

## 2022-06-27 RX ADMIN — Medication 50 MG: at 09:52

## 2022-06-27 RX ADMIN — SODIUM CHLORIDE, SODIUM LACTATE, POTASSIUM CHLORIDE, CALCIUM CHLORIDE: 600; 310; 30; 20 INJECTION, SOLUTION INTRAVENOUS at 10:05

## 2022-06-27 RX ADMIN — Medication 10 MG: at 11:45

## 2022-06-27 RX ADMIN — SODIUM CHLORIDE, POTASSIUM CHLORIDE, SODIUM LACTATE AND CALCIUM CHLORIDE: 600; 310; 30; 20 INJECTION, SOLUTION INTRAVENOUS at 09:44

## 2022-06-27 RX ADMIN — Medication 5 MG: at 11:49

## 2022-06-27 RX ADMIN — PROPOFOL 200 MG: 10 INJECTION, EMULSION INTRAVENOUS at 09:52

## 2022-06-27 RX ADMIN — MIDAZOLAM 2 MG: 1 INJECTION INTRAMUSCULAR; INTRAVENOUS at 09:44

## 2022-06-27 RX ADMIN — DEXAMETHASONE SODIUM PHOSPHATE 6 MG: 4 INJECTION, SOLUTION INTRA-ARTICULAR; INTRALESIONAL; INTRAMUSCULAR; INTRAVENOUS; SOFT TISSUE at 10:33

## 2022-06-27 RX ADMIN — SUGAMMADEX 200 MG: 100 INJECTION, SOLUTION INTRAVENOUS at 12:33

## 2022-06-27 RX ADMIN — TRANEXAMIC ACID 1950 MG: 650 TABLET ORAL at 08:15

## 2022-06-27 NOTE — ANESTHESIA CARE TRANSFER NOTE
Patient: Ivan Guzman    Procedure: Procedure(s):  Revision of resection of sternoclavicular joint with allograft reconstruction using a 26cm Semitendinosus Tendon  Placement of Right bacilic and second order catherization of IVC       Diagnosis: Pain of right sternoclavicular joint [M25.511]  Diagnosis Additional Information: No value filed.    Anesthesia Type:   General     Note:    Oropharynx: oropharynx clear of all foreign objects and spontaneously breathing  Level of Consciousness: awake  Oxygen Supplementation: nasal cannula  Level of Supplemental Oxygen (L/min / FiO2): 2  Independent Airway: airway patency satisfactory and stable  Dentition: dentition unchanged  Vital Signs Stable: post-procedure vital signs reviewed and stable  Report to RN Given: handoff report given  Patient transferred to: PACU    Handoff Report: Identifed the Patient, Identified the Reponsible Provider, Reviewed the pertinent medical history, Discussed the surgical course, Reviewed Intra-OP anesthesia mangement and issues during anesthesia, Set expectations for post-procedure period and Allowed opportunity for questions and acknowledgement of understanding      Vitals:  Vitals Value Taken Time   /76 06/27/22 1300   Temp     Pulse 112 06/27/22 1303   Resp 12    SpO2 100 % 06/27/22 1303   Vitals shown include unvalidated device data.    Electronically Signed By: ASHLEY Lugo CRNA  June 27, 2022  1:04 PM

## 2022-06-27 NOTE — BRIEF OP NOTE
[facility] Brief Operative Note    Pre-operative diagnosis: Pain of right sternoclavicular joint [M25.511]   Post-operative diagnosis * No post-op diagnosis entered *  same   Procedure: Procedure(s):  Revision of resection of sternoclavicular joint with allograft reconstruction using a 26cm Semitendinosus Tendon  Placement of Right bacilic and second order catherization of IVC   Surgeon(s): Surgeon(s) and Role:  Panel 1:     * Luca Crane MD - Primary  Panel 2:     * Brittany Anderson MD - Primary   Tracie Bui MD   Estimated blood loss: 50 mL    Specimens: * No specimens in log *   Findings: Arthritic sternoclavicular joint

## 2022-06-27 NOTE — ANESTHESIA POSTPROCEDURE EVALUATION
Patient: Ivan Guzman    Procedure: Procedure(s):  Revision of resection of sternoclavicular joint with allograft reconstruction using a 26cm Semitendinosus Tendon  Placement of Right bacilic and second order catherization of IVC       Anesthesia Type:  General    Note:  Disposition: Outpatient   Postop Pain Control: Uneventful            Sign Out: Well controlled pain   PONV: No   Neuro/Psych: Uneventful            Sign Out: Acceptable/Baseline neuro status   Airway/Respiratory: Uneventful            Sign Out: Acceptable/Baseline resp. status   CV/Hemodynamics: Uneventful            Sign Out: Acceptable CV status; No obvious hypovolemia; No obvious fluid overload   Other NRE: NONE   DID A NON-ROUTINE EVENT OCCUR?            Last vitals:  Vitals Value Taken Time   /76 06/27/22 1300   Temp     Pulse 104 06/27/22 1307   Resp     SpO2 100 % 06/27/22 1307   Vitals shown include unvalidated device data.    Electronically Signed By: Malcom Escudero MD  June 27, 2022  1:08 PM

## 2022-06-27 NOTE — ANESTHESIA PROCEDURE NOTES
Arterial Line Procedure Note    Pre-Procedure   Staff -        Anesthesiologist:  Les Driver MD       Performed By: anesthesiologist       Pre-Anesthestic Checklist: patient identified, IV checked, risks and benefits discussed, informed consent, monitors and equipment checked, pre-op evaluation and at physician/surgeon's request  Timeout:       Correct Patient: Yes        Correct Procedure: Yes        Correct Site: Yes        Correct Position: Yes   Line Placement:   This line was placed Post Induction  Procedure   Procedure: arterial line       Laterality: left       Insertion Site: radial.  Sterile Prep        Skin prep: Chloraprep  Insertion/Injection        Technique: Seldinger Technique        Catheter Type/Size: 20 G, 12 cm  Narrative        Tegaderm dressing used.       Complications: None apparent,        Arterial waveform: Yes

## 2022-06-27 NOTE — ANESTHESIA PROCEDURE NOTES
Airway       Patient location during procedure: OR       Procedure Start/Stop Times: 6/27/2022 9:56 AM  Staff -        CRNA: Christina Keene APRN CRNA       Performed By: CRNA  Consent for Airway        Urgency: elective  Indications and Patient Condition       Indications for airway management: alicia-procedural       Induction type:intravenous       Mask difficulty assessment: 1 - vent by mask    Final Airway Details       Final airway type: endotracheal airway       Successful airway: ETT - single  Endotracheal Airway Details        ETT size (mm): 7.5       Cuffed: yes       Successful intubation technique: direct laryngoscopy       DL Blade Type: Poe 2       Grade View of Cords: 1       Adjucts: stylet       Measured from: gums/teeth       Secured at (cm): 22    Post intubation assessment        Placement verified by: capnometry, equal breath sounds and chest rise        Number of attempts at approach: 1       Secured with: silk tape       Ease of procedure: easy       Dentition: Intact    Medication(s) Administered   Medication Administration Time: 6/27/2022 9:56 AM

## 2022-06-27 NOTE — OP NOTE
Date: June 27, 2022    Pre-operative Diagnosis:  Right shoulder sternoclavicular joint arthritis and need for balloon control of the superior vena cava     Post-operative Diagnosis: Same     Procedure(s):  1.  Placement of 8 Tunisian sheath in right basilic vein, 2.  Second-order catheterization of the inferior vena cava     Surgeon: Brittany Anderson MD     Assistant: Flip Garcia MS4     Anesthesia: General     Indications: This 22 year old male had right shoulder sternoclavicular joint arthritis and was planned for resection and allograft reconstruction by Dr. Chip Crane.  The proximal right subclavian vein and proximal superior vena cava were in close proximity and Dr. Tracie Bui of cardiac surgery was available for sternotomy.  I was consulted for possible balloon control to avoid sternotomy.  I met the patient preoperatively in a separate appointment and we discussed the risks and benefits.  He understood my portion of the procedure and wished to proceed.         Findings: Normal right basilic vein     Complications: None     Estimated Blood Loss:   Less than 1 mL     Drains: None     Specimens: None        Procedure Details:      The patient was brought to the operating room placed in the supine position.  After general anesthesia was achieved and timeout performed the right upper extremities prepped and draped in sterile fashion.  Ultrasound-guided access was gained into the right basilic vein with the micropuncture access.  Over a 035 short J-wire, the 8 Tunisian sheath was placed and flushed easily.  The 035 angle-tip stiff Glidewire and 5 Tunisian glide catheter were used to select the subclavian vein, superior vena cava and inferior vena cava.  The 035 angle-tip stiff Glidewire was exchanged for an 03 5 to 60 cm Valladares wire which was positioned at the caval confluence.  A heparin flush drip was hooked to the SideArm of the sheath to maintain patency while the primary team did the resection.    Once the resection was  complete all wires sheaths and catheters were removed and pressure held followed by placement of a Tegaderm.  The patient appeared at time of seizure well without immediate complication.  Sponge needle and instrument counts were reported as correct at the end of the case.  I was present throughout my portion of the procedure.              Brittany Anderson MD, DFSVS, RPVI  Director, [facility]  Chief, Vascular and Endovascular Surgery  [facility]  Pager: 1. Send message or 10 digit call back number Securely via [employer] with the [employer] Web Console (learn more here)              2. Outside of [facility]? Call [phone]

## 2022-06-27 NOTE — BRIEF OP NOTE
"[facility]    Brief Operative Note    Pre-operative diagnosis: Pain of right sternoclavicular joint [M25.511]  Post-operative diagnosis Same as pre-operative diagnosis    Procedure:  Placement of right basilic vein sheath and second order IVC catheterization.    Procedure(s):  Revision of resection of sternoclavicular joint with allograft reconstruction using a 26cm Semitendinosus Tendon  Placement of Right bacilic and second order catherization of IVC  Surgeon: Surgeon(s) and Role:  Panel 1:     * Luca Crane MD - Primary  Panel 2:     * Brittany Anderson MD - Primary  Anesthesia: General with Block   Estimated Blood Loss: {OR ESTIMATED BLOOD LOSS LIST:347721}    Drains: {Drains:829185::\"None\"}  Specimens: * No specimens in log *  Findings:   None.  Complications: None.  Implants:   Implant Name Type Inv. Item Serial No.  Lot No. LRB No. Used Action   Semitendinosus Tendon FRZ<= 26cm length   [id number]   Right 1 Implanted           "

## 2022-06-27 NOTE — OP NOTE
Procedure Date: 06/27/2022    PREOPERATIVE DIAGNOSIS:  Right shoulder sternoclavicular joint arthritis.    POSTOPERATIVE DIAGNOSIS:  Right shoulder sternoclavicular joint arthritis.    SURGICAL PROCEDURE:  Right sternoclavicular joint resection with allograft reconstruction.    SURGEON:  Luca Crane M.D.     ASSISTANT:  Tracie Bui M.D., and Brittany Anderson M.D.  Dr. Bui's assistance was required because of the orthopedic complexity the case, the scarred and adhesed nature of the great vessels, the posterior aspect of the clavicle, and her expertise in thoracic anatomy.  Dr. Anderson performed placement of a basilic vein catheter in case we need it for balloon protection and dilatation and obstruction of the great vessels.    LEARNER:  Lainey Mayo, resident.    IMPLANTS:  Semitendinosus allograft.    INDICATIONS FOR PROCEDURE:  Mr. Guzman is a very pleasant 22-year-old man with history of pain and disability in his shoulder.  We had previously performed a sternoclavicular joint reconstruction for symptomatic sternoclavicular joint instability.  His instability stopped; however, he developed significant pain and arthritis at this joint.  This was not alleviated by nonsurgical treatments, and consequently, he elected to proceed with surgical remediation.  After preoperative evaluation demonstrated a significant amount of scarring of the great vessels to the posterior aspect of the sternoclavicular joint, the above plan was made.  This was a team surgical approach with team management.  Dr. Anderson initiated the procedure.  Please see her description for full details.    DESCRIPTION OF PROCEDURE:  The patient was positively identified in the preanesthesia care area, surgical site was initialed by me, and his consent was reviewed with him.  He was then taken to the operating theater where he was placed in a well-padded supine position with a liter bag in between his shoulder blades.  He was then prepped and draped in the  usual sterile fashion for right upper extremity surgery.    Dr. Anderson then started with the right upper extremity and placed her vein.  After preparation for her portion of the procedure was completed, the incision was identified.  A repeat brief was performed.  I incised down through the skin and his previous scar.  It was extended slightly proximally and distally.  I was able to come down and identify the longitudinal anterior structures.  I came through these in line with the clavicle.  I dissected subperiosteally, superiorly and inferiorly to expose it.  The previous graft was there and had densely scarred down.  I removed this.  The previous drill hole through the sternum was present.  The previous drill hole through the clavicle was also present.  There was no evidence of instability at the sternoclavicular joint.  As I dissected subperiosteally superiorly and inferiorly, I was able to dissect to the point where there was instability of the sternoclavicular joint and it was able to be anteriorly subluxated.  Using Dr. Bui's excellent assistance and expertise, I was able to safely dissect the medial aspect of the clavicle.  I was also able to dissect underneath the sternum medially and posteriorly to allow access.  Once this was done, attention was turned to the graft.    The graft was prepared by placing a #2 FiberWire on each side.  This was performed to allow adequate tension.  The PCL guide from the [employer] set was used to re-drill the hole from the sternum, and then it was altered to drill from superior to inferior.  This figure-of-8 orientation was used because I needed to resect the medial aspect of the clavicle.  Once I resected the medial aspect of the clavicle, it was clearly in the tunnel from the previous drill hole.  Approximately 8 mm of this were removed.  Once that was done, I was able to drag the graft through the drill holes using an Angiocath as a suture passing device.  Once I passed the  wire, I was able to bring the tendon allograft through.  I crossed this to itself and sewed to itself.  I then placed a drill hole into the clavicle and sewed the end of the suture into this as well and tied.  This allowed for an excellent stability with no evidence of anterior, posterior subluxation of the sternoclavicular joint.    Following this, I cut the suture ends off the clavicle and tucked them into the sternoclavicular joint recess to act as an interposition arthroplasty.  Once I did so, I closed in a layered fashion with 0 Vicryls closing the fascia on its superficial aspect followed by 2-0 Vicryl and 3-0 running subcuticular Monocryl.  Steri-Strips and a sterile nonadherent dressing were applied.  A sling was placed.    POSTOPERATIVE PLAN:   1.  The patient will be discharged home today on oral analgesics.  He should have no active or passive range of motion at this time.  2.  At the 2-week eugenia, he will continue without any active or passive range of motion.  3.  At approximately 6 weeks, his sling will be discontinued and he will begin activities of daily living.  4.  At 3 months, he will be evaluated clinically to determine whether or not he needs formalized physical therapy.    Luca Crane MD        D: 2022   T: 2022   MT: TONMT    Name:     CORY DE LEON  MRN:      [id number]        Account:        [id number]   :      2000           Procedure Date: 2022     Document: [id number]    cc:  MD Brittany Wilkes MD

## 2022-06-27 NOTE — DISCHARGE INSTRUCTIONS
[facility], [place]  Same-Day Surgery   Adult Discharge Orders & Instructions     For 24 hours after surgery    Get plenty of rest.  A responsible adult must stay with you for at least 24 hours after you leave the hospital.   Do not drive or use heavy equipment.  If you have weakness or tingling, don't drive or use heavy equipment until this feeling goes away.  Do not drink alcohol.  Avoid strenuous or risky activities.  Ask for help when climbing stairs.   You may feel lightheaded.  IF so, sit for a few minutes before standing.  Have someone help you get up.   If you have nausea (feel sick to your stomach): Drink only clear liquids such as apple juice, ginger ale, broth or 7-Up.  Rest may also help.  Be sure to drink enough fluids.  Move to a regular diet as you feel able.  You may have a slight fever. Call the doctor if your fever is over 100 F (37.7 C) (taken under the tongue) or lasts longer than 24 hours.  You may have a dry mouth, a sore throat, muscle aches or trouble sleeping.  These should go away after 24 hours.  Do not make important or legal decisions.   Call your doctor for any of the followin.  Signs of infection (fever, growing tenderness at the surgery site, a large amount of drainage or bleeding, severe pain, foul-smelling drainage, redness, swelling).    2. It has been over 8 to 10 hours since surgery and you are still not able to urinate (pass water).    3.  Headache for over 24 hours.    4.  Numbness, tingling or weakness the day after surgery (if you had spinal anesthesia).  To contact a doctor, call ________________________________________ or:    '   [phone] and ask for the resident on call for   ______________________________________________ (answered 24 hours a day)  '   Emergency Department:    [facility]: [phone]       (TTY for hearing impaired: [phone])    [facility]: [phone]       (TTY for hearing impaired:  [phone])

## 2022-06-27 NOTE — ANESTHESIA PREPROCEDURE EVALUATION
Anesthesia Pre-Procedure Evaluation    Patient: Ivan Guzman   MRN:     [id number] Gender:   male   Age:    19 year old :      2000        Preoperative Diagnosis: Right Sternoclavicular Joint Pain   Procedure(s):  Right Sternoclavicular Joint Reconstruction With Allograft     Past Medical History:   Diagnosis Date     Chest pain       Past Surgical History:   Procedure Laterality Date     GENITOURINARY SURGERY       none       ORCHIECTOMY SCROTAL  3/6/2014    Procedure: ORCHIECTOMY SCROTAL;  Scrotal Exploration,Left Orchidopexy, ,Possible Right Orchiectomy;  Surgeon: Nazario Alvarado MD;  Location: [facility] OR     STERNOPLASTY Right 2019    Procedure: Right Sternoclavicular Joint Reconstruction With Allograft;  Surgeon: Luca Crane MD;  Location: [facility]          Anesthesia Evaluation     . Pt has had prior anesthetic. Type: General.           ROS/MED HX    ENT/Pulmonary:       Neurologic:       Cardiovascular:         METS/Exercise Tolerance:     Hematologic:         Musculoskeletal:         GI/Hepatic:         Renal/Genitourinary:         Endo:         Psychiatric:         Infectious Disease:         Malignancy:       Other:                     ANESTHESIA PHYSICAL [id number]    LABS:  CBC:   Lab Results   Component Value Date    WBC 5.9 2019    WBC 4.5 2016    HGB 15.5 2019    HGB 15.5 2016    HCT 46.1 2019    HCT 44.0 2016     2019     2016     BMP:   Lab Results   Component Value Date     2015    POTASSIUM 3.9 2015    CHLORIDE 106 2015    CO2 26 2015    BUN 17 2015    CR 0.95 2015    GLC 81 2015     COAGS: No results found for: PTT, INR, FIBR  POC:   Lab Results   Component Value Date    BGM 85 2019     OTHER:   Lab Results   Component Value Date    COURTNEY 8.6 (L) 2015    ALBUMIN 4.5 2015    PROTTOTAL 7.7 2015    ALT 20 2015    AST 22  "03/02/2015    ALKPHOS 183 03/02/2015    BILITOTAL 0.6 03/02/2015    TSH 1.85 03/02/2015    SED 4 03/02/2015        Preop Vitals    BP Readings from Last 3 Encounters:   06/27/22 135/83   06/21/22 118/72   03/23/22 128/68    Pulse Readings from Last 3 Encounters:   06/27/22 103   06/21/22 78   03/23/22 74      Resp Readings from Last 3 Encounters:   06/27/22 18   06/21/22 19   03/23/22 16    SpO2 Readings from Last 3 Encounters:   06/27/22 99%   06/21/22 97%   03/23/22 98%      Temp Readings from Last 1 Encounters:   06/27/22 36.8  C (98.3  F) (Oral)    Ht Readings from Last 1 Encounters:   06/27/22 1.803 m (5' 11\")      Wt Readings from Last 1 Encounters:   06/27/22 72.4 kg (159 lb 9.8 oz)    Estimated body mass index is 22.26 kg/m  as calculated from the following:    Height as of an earlier encounter on 6/27/22: 1.803 m (5' 11\").    Weight as of an earlier encounter on 6/27/22: 72.4 kg (159 lb 9.8 oz).     LDA:        Assessment:   ASA SCORE: 2            Plan:   Anes. Type:  General                                  Les Driver MD    Anesthesia Evaluation   Pt has had prior anesthetic. Type: General.        ROS/MED HX  ENT/Pulmonary:       Neurologic:       Cardiovascular:       METS/Exercise Tolerance:     Hematologic:       Musculoskeletal:       GI/Hepatic:       Renal/Genitourinary:       Endo:       Psychiatric/Substance Use:       Infectious Disease:       Malignancy:  - neg malignancy ROS     Other:                   Anesthesia Plan    ASA Status:  2      Anesthesia Type: General.         Techniques and Equipment:     - Lines/Monitors: 2nd IV, Arterial Line     Consents    Anesthesia Plan(s) and associated risks, benefits, and realistic alternatives discussed. Questions answered and patient/representative(s) expressed understanding.    - Discussed:     - Discussed with:  Patient      - Extended Intubation/Ventilatory Support Discussed: No.      - Patient is DNR/DNI Status: No    Use of blood products " discussed: Yes.     - Discussed with: Patient.     - Consented: consented to blood products            Reason for refusal: other.     Postoperative Care    Pain management: IV analgesics.   PONV prophylaxis: Ondansetron (or other 5HT-3), Dexamethasone or Solumedrol     Comments:

## 2022-07-01 ENCOUNTER — TELEPHONE (OUTPATIENT)
Dept: ORTHOPEDICS | Facility: CLINIC | Age: 22
End: 2022-07-01

## 2022-07-01 NOTE — TELEPHONE ENCOUNTER
Patient's mother was called and Ivan was scheduled for a 2 and 6 week post op with Sara and Dr. Crane.  She stated that Ivan has been doing well and did not have any other questions at this time.  She was thankful for the call back.

## 2022-07-01 NOTE — TELEPHONE ENCOUNTER
[facility] Call Center    Phone Message    May a detailed message be left on voicemail: no     Reason for Call: Other: Patient's mom calling-they are wondering if patient is supposed to have a f/up or what the next step is     Action Taken: Message routed to:  [facility]    Travel Screening: Not Applicable

## 2022-07-13 ENCOUNTER — OFFICE VISIT (OUTPATIENT)
Dept: ORTHOPEDICS | Facility: CLINIC | Age: 22
End: 2022-07-13
Payer: COMMERCIAL

## 2022-07-13 DIAGNOSIS — M25.511 PAIN OF RIGHT STERNOCLAVICULAR JOINT: Primary | ICD-10-CM

## 2022-07-13 PROCEDURE — 99024 POSTOP FOLLOW-UP VISIT: CPT

## 2022-07-13 NOTE — LETTER
7/13/2022         RE: Ivan Guzman  [address] [address] [address]        Dear Colleague,    Thank you for referring your patient, Ivan Guzman, to the [facility] [place]. Please see a copy of my visit note below.    Chief Complaint:   1. Follow up - 2 week    Procedures:  1. Right shoulder sternoclavicular joint reconstruction with allograft semitendinosis - DOS 9/27/2019  2. Revision Right sternoclavicular joint resection with allograft reconstruction - DOS 6/27/22       History:  Ivan Guzman is a 22 year old patient s/p above procedures here for follow up. He is doing very well post operatively. Notes pain has completely resolved, discomfort is controlled with Tylenol. Denies any redness, wound drainage, pain, numbness, or tingling. He is staying in the sling at all times except for showering. Is not working or going to school at this time.     Exam:     General: Awake, Alert, and oriented. Articulates and communicates with a normal affect     MSK:    Wearing ultra-sling    Incisions well healed without evidence of infection, steri strips in place     Range of motion and stability exam not performed    Motor intact distally throughout the radial, ulnar, and median nerve distributions as indicated by intact, 5/5 strength with thumbs up, finger crossing, and OK sign    Neurologic: SILT ax/m/r/u nerve distributions.     Vascular: Radial pulse palpable, 2+.    Imaging:  No new imaging.     Medications:     Current Outpatient Medications:      acetaminophen (TYLENOL) 325 MG tablet, Take 2 tablets (650 mg) by mouth every 4 hours as needed for mild pain, Disp: 50 tablet, Rfl: 0     ibuprofen (ADVIL/MOTRIN) 800 MG tablet, Take 1 tablet (800 mg) by mouth 3 times daily (with meals), Disp: 42 tablet, Rfl: 0     gabapentin (NEURONTIN) 300 MG capsule, Take 1 capsule (300 mg) by mouth At Bedtime (Patient not taking: Reported on 7/13/2022), Disp: 30 capsule, Rfl: 0     hydrOXYzine  (VISTARIL) 25 MG capsule, Take 25 mg by mouth 3 times daily as needed for itching (Patient not taking: Reported on 7/13/2022), Disp: , Rfl:      methocarbamol (ROBAXIN) 500 MG tablet, Take 1 tablet (500 mg) by mouth 4 times daily as needed for muscle spasms (Patient not taking: Reported on 7/13/2022), Disp: 30 tablet, Rfl: 0     ondansetron (ZOFRAN ODT) 4 MG ODT tab, Take 1 tablet (4 mg) by mouth every 8 hours as needed for nausea (Patient not taking: Reported on 7/13/2022), Disp: 4 tablet, Rfl: 0     oxyCODONE (ROXICODONE) 5 MG tablet, Take 1 tablet (5 mg) by mouth every 4 hours as needed for pain (Patient not taking: Reported on 7/13/2022), Disp: 30 tablet, Rfl: 0     oxyCODONE (ROXICODONE) 5 MG tablet, Take 1 tablet (5 mg) by mouth every 6 hours as needed for severe pain (Patient not taking: Reported on 7/13/2022), Disp: 15 tablet, Rfl: 0     senna-docusate (SENOKOT-S/PERICOLACE) 8.6-50 MG tablet, Take 1-2 tablets by mouth 2 times daily (Patient not taking: Reported on 7/13/2022), Disp: 30 tablet, Rfl: 0    Assesment:  Doing very well 2 weeks s/p Revision Right sternoclavicular joint resection with allograft reconstruction with Dr. Crane. We discussed that he should remain in the sling at all times other than for hygiene. He expressed understanding. [facility] with any questions or concerns.     Plan:   -NWB to RUE  -Sling to remain at all times without any active or passive ROM  -Follow up with Dr. Crane 8/1/22, sooner if necessary      Sara Forbes PA-C 7/13/2022 4:25 PM  Orthopedic Surgery

## 2022-07-13 NOTE — PROGRESS NOTES
Chief Complaint:   1. Follow up - 2 week    Procedures:  1. Right shoulder sternoclavicular joint reconstruction with allograft semitendinosis - DOS 9/27/2019  2. Revision Right sternoclavicular joint resection with allograft reconstruction - DOS 6/27/22       History:  Ivan Guzman is a 22 year old patient s/p above procedures here for follow up. He is doing very well post operatively. Notes pain has completely resolved, discomfort is controlled with Tylenol. Denies any redness, wound drainage, pain, numbness, or tingling. He is staying in the sling at all times except for showering. Is not working or going to school at this time.     Exam:     General: Awake, Alert, and oriented. Articulates and communicates with a normal affect     MSK:    Wearing ultra-sling    Incisions well healed without evidence of infection, steri strips in place     Range of motion and stability exam not performed    Motor intact distally throughout the radial, ulnar, and median nerve distributions as indicated by intact, 5/5 strength with thumbs up, finger crossing, and OK sign    Neurologic: SILT ax/m/r/u nerve distributions.     Vascular: Radial pulse palpable, 2+.    Imaging:  No new imaging.     Medications:     Current Outpatient Medications:      acetaminophen (TYLENOL) 325 MG tablet, Take 2 tablets (650 mg) by mouth every 4 hours as needed for mild pain, Disp: 50 tablet, Rfl: 0     ibuprofen (ADVIL/MOTRIN) 800 MG tablet, Take 1 tablet (800 mg) by mouth 3 times daily (with meals), Disp: 42 tablet, Rfl: 0     gabapentin (NEURONTIN) 300 MG capsule, Take 1 capsule (300 mg) by mouth At Bedtime (Patient not taking: Reported on 7/13/2022), Disp: 30 capsule, Rfl: 0     hydrOXYzine (VISTARIL) 25 MG capsule, Take 25 mg by mouth 3 times daily as needed for itching (Patient not taking: Reported on 7/13/2022), Disp: , Rfl:      methocarbamol (ROBAXIN) 500 MG tablet, Take 1 tablet (500 mg) by mouth 4 times daily as needed for muscle spasms  (Patient not taking: Reported on 7/13/2022), Disp: 30 tablet, Rfl: 0     ondansetron (ZOFRAN ODT) 4 MG ODT tab, Take 1 tablet (4 mg) by mouth every 8 hours as needed for nausea (Patient not taking: Reported on 7/13/2022), Disp: 4 tablet, Rfl: 0     oxyCODONE (ROXICODONE) 5 MG tablet, Take 1 tablet (5 mg) by mouth every 4 hours as needed for pain (Patient not taking: Reported on 7/13/2022), Disp: 30 tablet, Rfl: 0     oxyCODONE (ROXICODONE) 5 MG tablet, Take 1 tablet (5 mg) by mouth every 6 hours as needed for severe pain (Patient not taking: Reported on 7/13/2022), Disp: 15 tablet, Rfl: 0     senna-docusate (SENOKOT-S/PERICOLACE) 8.6-50 MG tablet, Take 1-2 tablets by mouth 2 times daily (Patient not taking: Reported on 7/13/2022), Disp: 30 tablet, Rfl: 0    Assesment:  Doing very well 2 weeks s/p Revision Right sternoclavicular joint resection with allograft reconstruction with Dr. Crane. We discussed that he should remain in the sling at all times other than for hygiene. He expressed understanding. He will reach out via [facility] with any questions or concerns.     Plan:   -NWB to RUE  -Sling to remain at all times without any active or passive ROM  -Follow up with Dr. Crane 8/1/22, sooner if necessary      Sara Forbes PA-C 7/13/2022 4:25 PM  Orthopedic Surgery

## 2022-07-13 NOTE — NURSING NOTE
Reason For Visit:   Chief Complaint   Patient presents with     Surgical Followup     2 week pop DOS: 6/27/22 Right sternoclavicular joint resection with allograft reconstruction. with Dr. Crane        There were no vitals taken for this visit.    Pain Assessment  Patient Currently in Pain: Tisha Barry, ATC

## 2022-08-01 ENCOUNTER — OFFICE VISIT (OUTPATIENT)
Dept: ORTHOPEDICS | Facility: CLINIC | Age: 22
End: 2022-08-01
Payer: COMMERCIAL

## 2022-08-01 DIAGNOSIS — M25.511 PAIN OF RIGHT STERNOCLAVICULAR JOINT: Primary | ICD-10-CM

## 2022-08-01 PROCEDURE — 99024 POSTOP FOLLOW-UP VISIT: CPT | Performed by: ORTHOPAEDIC SURGERY

## 2022-08-01 NOTE — NURSING NOTE
Reason For Visit:   Chief Complaint   Patient presents with     Surgical Followup     DOS 6/27/22 S/P Revision of resection of sternoclavicular joint with allograft reconstruction using a 26cm Semitendinosus Tendon - Right     PCP: Sebastien Guerrero     ?no  Occupation Not working  Date of injury: None  Date of surgery: 9/27/2019  Type of surgery: Right shoulder sternoclavicular joint reconstruction with allograft semitendinosis.   Smoker: None     Right hand dominant      SANE score  Affected shoulder: Right   Right shoulder SANE: 0  Left shoulder SANE: 100    There were no vitals taken for this visit.      Pain Assessment  Patient Currently in Pain: Tisha Soto LPN

## 2022-08-01 NOTE — LETTER
8/1/2022         RE: Ivan Guzman  [address] [address] [facility] [address]        Dear Colleague,    Thank you for referring your patient, Ivan Guzman, to the [facility] [place]. Please see a copy of my visit note below.    CHIEF COMPLAINT:  Right sternoclavicular joint resection and reconstruction, date of surgery 06/27/2022.    HISTORY OF PRESENT ILLNESS:  Mr. Guzman returns today for followup.  He has done pretty well.  His pain is improved.  His incision is clean, dry and intact.  He sets his anterior, middle and posterior deltoid well, opposes his fingers and has a palpable pulse.    ASSESSMENT:  Right shoulder status post above procedure, doing well.    PLAN:  I advanced him per his operative report.  He can come out of his sling.  He should use the arm for activities of daily living but nothing more significant.  I will see him back at the 3-month eugenia postoperatively with radiographs (AP at superior chest and serendipity view) and then discussion of whether or not he is going to need formalized physical therapy.      Luca Crane MD

## 2022-08-01 NOTE — PROGRESS NOTES
CHIEF COMPLAINT:  Right sternoclavicular joint resection and reconstruction, date of surgery 06/27/2022.    HISTORY OF PRESENT ILLNESS:  Mr. Guzman returns today for followup.  He has done pretty well.  His pain is improved.  His incision is clean, dry and intact.  He sets his anterior, middle and posterior deltoid well, opposes his fingers and has a palpable pulse.    ASSESSMENT:  Right shoulder status post above procedure, doing well.    PLAN:  I advanced him per his operative report.  He can come out of his sling.  He should use the arm for activities of daily living but nothing more significant.  I will see him back at the 3-month eugenia postoperatively with radiographs (AP at superior chest and serendipity view) and then discussion of whether or not he is going to need formalized physical therapy.

## 2022-09-16 DIAGNOSIS — M25.511 PAIN OF RIGHT STERNOCLAVICULAR JOINT: Primary | ICD-10-CM

## 2022-09-19 ENCOUNTER — ANCILLARY PROCEDURE (OUTPATIENT)
Dept: GENERAL RADIOLOGY | Facility: CLINIC | Age: 22
End: 2022-09-19
Attending: ORTHOPAEDIC SURGERY
Payer: COMMERCIAL

## 2022-09-19 ENCOUNTER — OFFICE VISIT (OUTPATIENT)
Dept: ORTHOPEDICS | Facility: CLINIC | Age: 22
End: 2022-09-19
Payer: COMMERCIAL

## 2022-09-19 DIAGNOSIS — M25.511 PAIN OF RIGHT STERNOCLAVICULAR JOINT: ICD-10-CM

## 2022-09-19 DIAGNOSIS — M25.511 PAIN OF RIGHT STERNOCLAVICULAR JOINT: Primary | ICD-10-CM

## 2022-09-19 PROCEDURE — 71046 X-RAY EXAM CHEST 2 VIEWS: CPT | Performed by: RADIOLOGY

## 2022-09-19 PROCEDURE — 99024 POSTOP FOLLOW-UP VISIT: CPT | Performed by: ORTHOPAEDIC SURGERY

## 2022-09-19 NOTE — PROGRESS NOTES
CHIEF CONCERN:  Right shoulder, status post revision resection of sternoclavicular joint with allograft reconstruction.     HISTORY OF PRESENT ILLNESS:  Mr. Guzman returns today for followup.  He is seen again with his mother.  He is doing well.  His pain is dramatically improved from preop.  He is starting to use it more.    PHYSICAL EXAMINATION:  Today he has 130 degrees of forward elevation, 30 degrees of external rotation at the side.  His incision is clean, dry and intact.  There is no evidence of sternoclavicular joint instability.    IMAGING:  Serendipity views and chest x-ray show an aligned sternoclavicular joint.  It is symmetric to the contralateral side without evidence of subluxation or olisthesis.    ASSESSMENT:  Right shoulder, status post above procedure, doing well.    PLAN:  I had a nice talk with Ivan and his mom today.  We talked about the fact that I thought he was doing okay and he can progress to physical therapy.  Orders were put in the computer.  I will see him back at the 6-month eugenia postoperatively.  We did talk about reasonable return to activities including that it would be likely for him to swing and possible for him to play doubles tennis, depending on his recovery.  I will see him back at that point and we can discuss things further.  No new radiographs are necessary at that time if he is doing okay.    Luca Crane MD    cc:  Tracie Bui  [facility] Cardiothoracic Surgery

## 2022-09-19 NOTE — LETTER
9/19/2022         RE: Ivan Guzman  [address] [address] [address]        Dear Colleague,    Thank you for referring your patient, Ivan Guzman, to the [facility] [place]. Please see a copy of my visit note below.    CHIEF CONCERN:  Right shoulder, status post revision resection of sternoclavicular joint with allograft reconstruction.     HISTORY OF PRESENT ILLNESS:  Mr. Guzman returns today for followup.  He is seen again with his mother.  He is doing well.  His pain is dramatically improved from preop.  He is starting to use it more.    PHYSICAL EXAMINATION:  Today he has 130 degrees of forward elevation, 30 degrees of external rotation at the side.  His incision is clean, dry and intact.  There is no evidence of sternoclavicular joint instability.    IMAGING:  Serendipity views and chest x-ray show an aligned sternoclavicular joint.  It is symmetric to the contralateral side without evidence of subluxation or olisthesis.    ASSESSMENT:  Right shoulder, status post above procedure, doing well.    PLAN:  I had a nice talk with Ivan and his mom today.  We talked about the fact that I thought he was doing okay and he can progress to physical therapy.  Orders were put in the computer.  I will see him back at the 6-month eugenia postoperatively.  We did talk about reasonable return to activities including that it would be likely for him to swing and possible for him to play doubles tennis, depending on his recovery.  I will see him back at that point and we can discuss things further.  No new radiographs are necessary at that time if he is doing okay.    Luca Crane MD    cc:  Tracie Bui  [facility] Cardiothoracic Surgery

## 2022-09-19 NOTE — NURSING NOTE
Reason For Visit:   Chief Complaint   Patient presents with     Surgical Followup     DOS 6/27/22 S/P Revision of resection of sternoclavicular joint with allograft reconstruction using a 26cm Semitendinosus Tendon - Right       PCP: [facility]    ?no  Occupation Not working  Date of injury: None  Date of surgery: 9/27/2019  Type of surgery: Right shoulder sternoclavicular joint reconstruction with allograft semitendinosis.   Smoker: None     Right hand dominant    SANE score  Affected shoulder: Right   Right shoulder SANE: 30  Left shoulder SANE: 100    There were no vitals taken for this visit.      Pain Assessment  Patient Currently in Pain: Tisha Soto LPN

## 2022-10-07 ENCOUNTER — HOSPITAL ENCOUNTER (OUTPATIENT)
Dept: PHYSICAL THERAPY | Facility: CLINIC | Age: 22
Setting detail: THERAPIES SERIES
Discharge: HOME | End: 2022-10-07
Attending: ORTHOPAEDIC SURGERY
Payer: COMMERCIAL

## 2022-10-07 DIAGNOSIS — M25.511 PAIN OF RIGHT STERNOCLAVICULAR JOINT: ICD-10-CM

## 2022-10-07 PROCEDURE — 97110 THERAPEUTIC EXERCISES: CPT | Mod: GP | Performed by: PHYSICAL THERAPIST

## 2022-10-07 PROCEDURE — 97161 PT EVAL LOW COMPLEX 20 MIN: CPT | Mod: GP | Performed by: PHYSICAL THERAPIST

## 2022-10-07 NOTE — PROGRESS NOTES
[facility]    Outpatient Physical Therapy Discharge Note  Patient: Ivan Guzman  : 2000    Beginning/End Dates of Reporting Period:  3/4 to 3/22/22    Referring Provider: Repa    Therapy Diagnosis: SCJ     Client Self Report: has surgery scheduled for .  overall, does feel a noticible improvement.     Objective Measurements:         Goals:  Goal Identifier 1   Goal Description pt will be able to wash hair with R shoulder without pain   Target Date 22   Date Met      Progress (detail required for progress note):       Goal Identifier 2   Goal Description pt will be able to reach top shelf without pain   Target Date 22   Date Met      Progress (detail required for progress note):       Goal Identifier 3   Goal Description pt will be able to lift 5# overhead without pain   Target Date 22   Date Met      Progress (detail required for progress note):       Goal Identifier     Goal Description     Target Date     Date Met      Progress (detail required for progress note):               Plan:  Discharge from therapy.    Discharge:    Reason for Discharge: Patient has failed to schedule further appointments.    Discharge Plan: Patient to continue home program.

## 2022-10-14 ENCOUNTER — HOSPITAL ENCOUNTER (OUTPATIENT)
Dept: PHYSICAL THERAPY | Facility: CLINIC | Age: 22
Setting detail: THERAPIES SERIES
Discharge: HOME | End: 2022-10-14
Attending: ORTHOPAEDIC SURGERY
Payer: COMMERCIAL

## 2022-10-14 PROCEDURE — 97110 THERAPEUTIC EXERCISES: CPT | Mod: GP | Performed by: PHYSICAL THERAPIST

## 2022-10-14 PROCEDURE — 97140 MANUAL THERAPY 1/> REGIONS: CPT | Mod: GP | Performed by: PHYSICAL THERAPIST

## 2022-10-27 ENCOUNTER — HOSPITAL ENCOUNTER (OUTPATIENT)
Dept: PHYSICAL THERAPY | Facility: CLINIC | Age: 22
Setting detail: THERAPIES SERIES
Discharge: HOME | End: 2022-10-27
Attending: ORTHOPAEDIC SURGERY
Payer: COMMERCIAL

## 2022-10-27 PROCEDURE — 97110 THERAPEUTIC EXERCISES: CPT | Mod: GP | Performed by: PHYSICAL THERAPIST

## 2022-11-09 ENCOUNTER — HOSPITAL ENCOUNTER (OUTPATIENT)
Dept: PHYSICAL THERAPY | Facility: CLINIC | Age: 22
Setting detail: THERAPIES SERIES
Discharge: HOME | End: 2022-11-09
Attending: ORTHOPAEDIC SURGERY
Payer: COMMERCIAL

## 2022-11-09 PROCEDURE — 97110 THERAPEUTIC EXERCISES: CPT | Mod: GP | Performed by: PHYSICAL THERAPIST

## 2022-11-19 ENCOUNTER — HEALTH MAINTENANCE LETTER (OUTPATIENT)
Age: 22
End: 2022-11-19

## 2022-11-23 ENCOUNTER — HOSPITAL ENCOUNTER (OUTPATIENT)
Dept: PHYSICAL THERAPY | Facility: CLINIC | Age: 22
Setting detail: THERAPIES SERIES
Discharge: HOME | End: 2022-11-23
Attending: ORTHOPAEDIC SURGERY
Payer: COMMERCIAL

## 2022-11-23 PROCEDURE — 97110 THERAPEUTIC EXERCISES: CPT | Mod: GP | Performed by: PHYSICAL THERAPIST

## 2022-12-16 ENCOUNTER — HOSPITAL ENCOUNTER (OUTPATIENT)
Dept: PHYSICAL THERAPY | Facility: CLINIC | Age: 22
Setting detail: THERAPIES SERIES
Discharge: HOME | End: 2022-12-16
Attending: ORTHOPAEDIC SURGERY
Payer: COMMERCIAL

## 2022-12-16 PROCEDURE — 97110 THERAPEUTIC EXERCISES: CPT | Mod: GP | Performed by: PHYSICAL THERAPIST

## 2022-12-16 NOTE — PROGRESS NOTES
12/16/22 0900   Signing Clinician's Name / Credentials   Signing clinician's name / credentials Willam Freed PT [facility]   Session Number   Session Number 6   Ortho Goal 1   Goal Identifier 1   Goal Description pt will be able to reach top shelf   Target Date 10/21/22   Date Met 11/09/22   Ortho Goal 2   Goal Identifier 2   Goal Description pt will be able to lift milk jug   Goal Progress improving.  able to pour a glass of milk   Target Date 11/18/22   Ortho Goal 3   Goal Identifier 3   Goal Description pt will be able to do a push up   Target Date 12/30/22   Subjective Report   Subjective Report less UT burning.  feels like things are coming along.  feels like ADLs are good.  not pushing anything heavy yet.   Objective Measure 1   Objective Measure PROM flexion GH stabilized- 145 rx, abd 140 deg   Details AROM 155 flex, 150 abd, ER0 80, IRT5   Objective Measure 2   Objective Measure ER 4+/5, lift off 4/5, bicep 4/5, tricep 4/5   Therapeutic Procedure/exercise   Therapeutic Procedures: strength, endurance, ROM, flexibillity minutes (51955) 30   Skilled Intervention GH mobility, ST strength   Patient Response improving kinematics   Treatment Detail UNE 3 min level 3 warm up.  PROM manual supine for GHJ in IR, flex and abd.  Review HEP and progression.  Added BTB row x 20, Bicpe curl x 4# x 15   Plan   Plan for next session Progress after Dr fleming   Total Session Time   Timed Code Treatment Minutes 30   Total Treatment Time (sum of timed and untimed services) 30

## 2022-12-19 ENCOUNTER — OFFICE VISIT (OUTPATIENT)
Dept: ORTHOPEDICS | Facility: CLINIC | Age: 22
End: 2022-12-19
Payer: COMMERCIAL

## 2022-12-19 DIAGNOSIS — M25.511 PAIN OF RIGHT STERNOCLAVICULAR JOINT: Primary | ICD-10-CM

## 2022-12-19 PROCEDURE — 99212 OFFICE O/P EST SF 10 MIN: CPT | Performed by: ORTHOPAEDIC SURGERY

## 2022-12-19 NOTE — PROGRESS NOTES
CHIEF CONCERN:  Right shoulder sternoclavicular joint resection with allograft reconstruction.    DATE OF SURGERY:  06/27/2022    HISTORY OF PRESENT ILLNESS:  Mr. Guzman returns today for followup.  He is seen today with his mother.  He is doing well.  He is back to doing most activities.  He has been working with his physical therapist on some range of motion and started some early strengthening.  He is interested in that enclosed chain stuff.    He is also interested in a re-referral for adult therapy.    PHYSICAL EXAMINATION:  Today he has 165 degrees of forward elevation, 60 degrees of external rotation at the side.  He has 5/5 forward elevator and external rotator strength.  He has some mild crepitation, but no pain with range of motion of the sternoclavicular joint.  It is stable through an arc of motion.  There is no evidence of erythema or skin breakdown at the incision site.    ASSESSMENT:  Status post above procedure, doing well.    PLAN:  I had a nice talk with Mr. Guzman today.  I think we will make a referral for him for mental health counseling and therapy.  He can expand on his physical therapy as well.  We will see him back at the anniversary of his surgery with repeat radiographs or sooner should any additional problems arise. (Radiographs will be serendipity view.)

## 2022-12-19 NOTE — NURSING NOTE
Reason For Visit:   Chief Complaint   Patient presents with     Surgical Followup     6 month pop DOS: 6/27/22 Right sternoclavicular joint resection with allograft reconstruction       PCP: [facility]     ?no  Occupation Not working  Date of injury: None  Date of surgery: 9/27/2019  Type of surgery: Right shoulder sternoclavicular joint reconstruction with allograft semitendinosis.   Smoker: None     Right hand dominant    SANE score  Affected shoulder: Right   Right shoulder SANE: 65  Left shoulder SANE: 100    There were no vitals taken for this visit.      Pain Assessment  Patient Currently in Pain: Tisha Soto LPN

## 2022-12-19 NOTE — LETTER
12/19/2022         RE: Ivan Guzman  [id number] [facility] [address]        Dear Colleague,    Thank you for referring your patient, Ivan Guzman, to the [facility] [place]. Please see a copy of my visit note below.    CHIEF CONCERN:  Right shoulder sternoclavicular joint resection with allograft reconstruction.    DATE OF SURGERY:  06/27/2022    HISTORY OF PRESENT ILLNESS:  Mr. Guzman returns today for followup.  He is seen today with his mother.  He is doing well.  He is back to doing most activities.  He has been working with his physical therapist on some range of motion and started some early strengthening.  He is interested in that enclosed chain stuff.    He is also interested in a re-referral for adult therapy.    PHYSICAL EXAMINATION:  Today he has 165 degrees of forward elevation, 60 degrees of external rotation at the side.  He has 5/5 forward elevator and external rotator strength.  He has some mild crepitation, but no pain with range of motion of the sternoclavicular joint.  It is stable through an arc of motion.  There is no evidence of erythema or skin breakdown at the incision site.    ASSESSMENT:  Status post above procedure, doing well.    PLAN:  I had a nice talk with Mr. Guzman today.  I think we will make a referral for him for mental health counseling and therapy.  He can expand on his physical therapy as well.  We will see him back at the anniversary of his surgery with repeat radiographs or sooner should any additional problems arise. (Radiographs will be serendipity view.)      Luca Crane MD

## 2022-12-28 ENCOUNTER — TELEPHONE (OUTPATIENT)
Dept: BEHAVIORAL HEALTH | Facility: CLINIC | Age: 22
End: 2022-12-28

## 2023-01-03 ENCOUNTER — HOSPITAL ENCOUNTER (OUTPATIENT)
Dept: BEHAVIORAL HEALTH | Facility: CLINIC | Age: 23
Discharge: HOME | End: 2023-01-03
Attending: ORTHOPAEDIC SURGERY | Admitting: ORTHOPAEDIC SURGERY
Payer: COMMERCIAL

## 2023-01-03 DIAGNOSIS — F32.A DEPRESSION, UNSPECIFIED DEPRESSION TYPE: Primary | ICD-10-CM

## 2023-01-03 DIAGNOSIS — M25.511 PAIN OF RIGHT STERNOCLAVICULAR JOINT: ICD-10-CM

## 2023-01-03 PROCEDURE — 90791 PSYCH DIAGNOSTIC EVALUATION: CPT | Mod: GT,95 | Performed by: COUNSELOR

## 2023-01-03 ASSESSMENT — COLUMBIA-SUICIDE SEVERITY RATING SCALE - C-SSRS
6. HAVE YOU EVER DONE ANYTHING, STARTED TO DO ANYTHING, OR PREPARED TO DO ANYTHING TO END YOUR LIFE?: NO
1. IN THE PAST MONTH, HAVE YOU WISHED YOU WERE DEAD OR WISHED YOU COULD GO TO SLEEP AND NOT WAKE UP?: NO
3. HAVE YOU BEEN THINKING ABOUT HOW YOU MIGHT KILL YOURSELF?: YES
5. HAVE YOU STARTED TO WORK OUT OR WORKED OUT THE DETAILS OF HOW TO KILL YOURSELF? DO YOU INTEND TO CARRY OUT THIS PLAN?: YES
4. HAVE YOU HAD THESE THOUGHTS AND HAD SOME INTENTION OF ACTING ON THEM?: NO
2. HAVE YOU ACTUALLY HAD ANY THOUGHTS OF KILLING YOURSELF IN THE PAST MONTH?: NO

## 2023-01-03 ASSESSMENT — PATIENT HEALTH QUESTIONNAIRE - PHQ9
SUM OF ALL RESPONSES TO PHQ QUESTIONS 1-9: 14
SUM OF ALL RESPONSES TO PHQ QUESTIONS 1-9: 14
10. IF YOU CHECKED OFF ANY PROBLEMS, HOW DIFFICULT HAVE THESE PROBLEMS MADE IT FOR YOU TO DO YOUR WORK, TAKE CARE OF THINGS AT HOME, OR GET ALONG WITH OTHER PEOPLE: SOMEWHAT DIFFICULT

## 2023-01-03 NOTE — PATIENT INSTRUCTIONS
Date: Thursday, 1/5/2023  Time: 10:00 am - 11:00 am  Provider: Gabriel Null  [facility]  Location: [facility], [address], [address], [place] [address]  Phone: [phone]  Type: Teletherapy    Patient Instructions  Please fill New Patient Form by using following link. All forms need to be completed 24 hours prior to the appointment date/time by going to [email or web address] Please call us on [phone] 24 hours prior to your scheduled appointment to confirm that you are able to attend. We will provide you information about how to log into video call software when you call.    Date: Thursday, 1/5/2023  Time: 2:00 pm - 3:00 pm  Provider: Kunal Manuel MA, CNP,RN  Location: [facility], [address], [address], [place] [address]  Phone: [phone]  Type: Telepsychiatry    Patient Instructions  Please fill New Patient Form by using following link. All forms need to be completed 72 hours prior to the appointment date/time by going to [email or web address] Please call us on [phone] 96 hours prior to your scheduled appointment to confirm that you are able to attend. We will provide you information about how to log into video call software when you call.    [facility]  [address] [place], [address]  [phone]    [facility]  Address: [address], [place] [address]  Phone: [phone]    [facility]  Address: [address] [place] [address]  Phone: [phone]

## 2023-01-03 NOTE — PROGRESS NOTES
"    [facility]      PATIENT'S NAME: Ivan Guzman  PREFERRED NAME: Ivan  PRONOUNS:       MRN: [id number]  : 2000  ADDRESS: [address] [facility] [address]  [facility]  [id number]  DATE OF SERVICE: 23  START TIME: 8:30am   END TIME: 9:13am   PREFERRED PHONE: [phone]  May we leave a program related message: Yes  SERVICE MODALITY:  Video Visit:      Provider verified identity through the following two step process.  Patient provided:  Patient  and Patient address    Telemedicine Visit: The patient's condition can be safely assessed and treated via synchronous audio and visual telemedicine encounter.      Reason for Telemedicine Visit: Services only offered telehealth    Originating Site (Patient Location): Patient's home    Distant Site (Provider Location): [facility]    Consent:  The patient/guardian has verbally consented to: the potential risks and benefits of telemedicine (video visit) versus in person care; bill my insurance or make self-payment for services provided; and responsibility for payment of non-covered services.     Patient would like the video invitation sent by:  My Chart    Mode of Communication:  [facility]    Distant Location (Provider):  Off-site    As the provider I attest to compliance with applicable laws and regulations related to telemedicine.    UNIVERSAL ADULT Mental Health DIAGNOSTIC ASSESSMENT    Identifying Information:  Patient is a 22 year old,   individual.  Patient was referred for an assessment by hospital.  Patient attended the session alone.    Chief Complaint:   The reason for seeking services at this time is: \"Mental Health\".  The problem(s) began 21.    Patient has attempted to resolve these concerns in the past through Therapy and medication management..    Social/Family History:  Patient reported they grew up in other " [place].  They were raised by biological parents; stepfather  .  Parents  / .  Patient reported that their childhood was \"it was a lot of moving. When I was 2 or 3 my dad joined the [employer]. I lived in [place], [place] and when I was 5 we came back to [place]. My parents got  and the moving became more at that point. We lived in 15 different houses\".  Patient described their current relationships with family of origin as \"I think it's good. I currently live with my mom and step dad. That is going well so far. My dad I don't see often, but when I do see him it's all good\".     The patient describes their cultural background as .  Cultural influences and impact on patient's life structure, values, norms, and healthcare: none.  Contextual influences on patient's health include: None.    These factors will be addressed in the Preliminary Treatment plan. Patient identified their preferred language to be English. Patient reported they does not need the assistance of an  or other support involved in therapy.     Patient reported had no significant delays in developmental tasks.   Patient's highest education level was high school graduate  .  Patient identified the following learning problems: none reported.  Modifications will not be used to assist communication in therapy.  Patient reports they are  able to understand written materials.    Patient reported the following relationship history None.  Patient's current relationship status is single for \"my whole life\".   Patient identified their sexual orientation as asexual.  Patient reported having   0 child(willie). Patient identified parents; siblings; friends as part of their support system.  Patient identified the quality of these relationships as stable and meaningful,  .      Patient's current living/housing situation involves staying with someone.  The immediate members of family and household include Aimee, 51,Mother and step father and " they report that housing is stable.    Patient is currently employed part time.  Patient reports their finances are obtained through employment. Patient does identify finances as a current stressor.      Patient reported that they have not been involved with the legal system.    . Patient does not report being under probation/ parole/ jurisdiction. .    Patient's Strengths and Limitations:  Patient identified the following strengths or resources that will help them succeed in treatment: friends / good social support and family support. Things that may interfere with the patient's success in treatment include: none identified.     Assessments:  The following assessments were completed by patient for this visit:  PHQ9:   PHQ-9 SCORE 12/6/2017 5/25/2018 5/10/2019 12/16/2019 1/3/2022 6/21/2022 1/3/2023   PHQ-9 Total Score - - - - - - -   PHQ-9 Total Score MyChart - - - 3 (Minimal depression) - 9 (Mild depression) 14 (Moderate depression)   PHQ-9 Total Score 17 5 1 3 12 9 14     GAD2:   KERON-2 1/3/2023   Feeling nervous, anxious, or on edge 1   Not being able to stop or control worrying 1   KERON-2 Total Score 2     CAGE-AID:   CAGE-AID Total Score 1/3/2023   Total Score 0   Total Score MyChart 0 (A total score of 2 or greater is considered clinically significant)     PROMIS 10-Global Health (all questions and answers displayed):   PROMIS 10 1/3/2023   In general, would you say your health is: Fair   In general, would you say your quality of life is: Very good   In general, how would you rate your physical health? Good   In general, how would you rate your mental health, including your mood and your ability to think? Poor   In general, how would you rate your satisfaction with your social activities and relationships? Good   In general, please rate how well you carry out your usual social activities and roles Fair   To what extent are you able to carry out your everyday physical activities such as walking, climbing stairs,  carrying groceries, or moving a chair? Mostly   How often have you been bothered by emotional problems such as feeling anxious, depressed or irritable? Often   How would you rate your fatigue on average? Severe   How would you rate your pain on average?   0 = No Pain  to  10 = Worst Imaginable Pain 0   In general, would you say your health is: 2   In general, would you say your quality of life is: 4   In general, how would you rate your physical health? 3   In general, how would you rate your mental health, including your mood and your ability to think? 1   In general, how would you rate your satisfaction with your social activities and relationships? 3   In general, please rate how well you carry out your usual social activities and roles. (This includes activities at home, at work and in your community, and responsibilities as a parent, child, spouse, employee, friend, etc.) 2   To what extent are you able to carry out your everyday physical activities such as walking, climbing stairs, carrying groceries, or moving a chair? 4   In the past 7 days, how often have you been bothered by emotional problems such as feeling anxious, depressed, or irritable? 4   In the past 7 days, how would you rate your fatigue on average? 4   In the past 7 days, how would you rate your pain on average, where 0 means no pain, and 10 means worst imaginable pain? 0   Global Mental Health Score 10   Global Physical Health Score 14   PROMIS TOTAL - SUBSCORES 24   Some recent data might be hidden     Irion Suicide Severity Rating Scale (Lifetime/Recent)  Irion Suicide Severity Rating (Lifetime/Recent) 2022 2022 1/3/2023   Wish to be Dead (Lifetime) Yes Yes -   Comments 7th grade, a year, couple days a week, 3-4; couple relatives  by suicide, - -   Q1 Wished to be Dead (Past Month) - no no   Q2 Suicidal Thoughts (Past Month) - no no   Q3 Suicidal Thought Method - no yes   Q4 Suicidal Intent without Specific Plan - no no  "  Q5 Suicide Intent with Specific Plan - no yes   Q6 Suicide Behavior (Lifetime) - yes no   Within the Past 3 Months? - no -   Level of Risk per Screen - moderate risk high risk   RETIRED: 1. Wish to be Dead (Recent) No - -   Active Suicidal Ideation with Some Intent to Act, Without Specific Plan Description (Recent) - N/A -       Personal and Family Medical History:  Patient does report a family history of mental health concerns.  Patient reports family history includes Cancer in his maternal grandmother and paternal grandmother; Depression in his brother; Hypertension in his maternal grandfather and maternal grandmother..     Patient does report Mental Health Diagnosis and/or Treatment.  Patient Patient reported the following previous diagnoses which include(s): an Anxiety Disorder and a Bipolar Disorder.  Patient reported symptoms began \"2012\".   Patient has received mental health services in the past: therapy  and psychiatry. Psychiatric Hospitalizations: None.  Patient denies a history of civil commitment.  Patient is not receiving other mental health services.  These include none.         Patient has had a physical exam to rule out medical causes for current symptoms.  Date of last physical exam was within the past year. Client was encouraged to follow up with PCP if symptoms were to develop. The patient does not have a Primary Care Provider and was encouraged to establish care with a PCP..  Patient reports no current medical concerns.  Patient denies any issues with pain..   There are significant appetite / nutritional concerns / weight changes.   Patient does report a history of head injury / trauma / cognitive impairment.  \"I fractured my skull when I was 1 years old\"    Patient reports not taking any current medications    Medication Adherence:  Patient reports not currently prescribed.  .    Patient Allergies:    Allergies   Allergen Reactions     Nka [No Known Allergies]        Medical History:    Past " "Medical History:   Diagnosis Date     Chest pain          Current Mental Status Exam:   Appearance:  Appropriate    Eye Contact:  Good   Psychomotor:  Normal       Gait / station:  no problem  Attitude / Demeanor: Cooperative   Speech      Rate / Production: Normal/ Responsive      Volume:  Normal  volume      Language:  no problems  Mood:   Normal  Affect:   Appropriate    Thought Content: Clear   Thought Process: Coherent       Associations: No loosening of associations  Insight:   Good   Judgment:  Intact   Orientation:  All  Attention/concentration: Good      Substance Use:  Patient did report a family history of substance use concerns; see medical history section for details.  Patient has not received chemical dependency treatment in the past.  Patient has not ever been to detox.      Patient is not currently receiving any chemical dependency treatment.           Substance History of use Age of first use Date of last use     Pattern and duration of use (include amounts and frequency)   Alcohol currently use   17 01/02/23 REPORTS SUBSTANCE USE: reports using substance 2 times per week and has 3 beer or mixed drinks  at a time.   Patient reports heaviest use is current use.   Cannabis   currently use 18 01/02/23 REPORTS SUBSTANCE USE: reports using substance 1 times per day and has 1 Vape at a time.   Patient reports heaviest use was \"[id number]\".     Amphetamines   never used     REPORTS SUBSTANCE USE: N/A   Cocaine/crack    never used       REPORTS SUBSTANCE USE: N/A   Hallucinogens used in the past   19  06/15/19  REPORTS SUBSTANCE USE: N/A   Inhalants never used         REPORTS SUBSTANCE USE: N/A   Heroin never used         REPORTS SUBSTANCE USE: N/A   Other Opiates never used     REPORTS SUBSTANCE USE: N/A   Benzodiazepine   never used     REPORTS SUBSTANCE USE: N/A   Barbiturates never used     REPORTS SUBSTANCE USE: N/A   Over the counter meds never used     REPORTS SUBSTANCE USE: N/A   Caffeine currently " "use 10  2 weeks ago REPORTS SUBSTANCE USE: reports using substance 2 times per week and has 1 Soda at a time.   Patient reports heaviest use was \"Right after high school, so like 2017\".   Nicotine  used in the past 20 12/03/22 {REPORTS SUBSTANCE USE: Occasional use. \"my friends all vape, so if we are together I might take one hit then stop\"   Other substances not listed above:  Identify:  never used     REPORTS SUBSTANCE USE: N/A     Patient reported the following problems as a result of their substance use: no problems, not applicable.    Substance Use: No symptoms    Based on the positive CAGE score and clinical interview there  are not indications of drug or alcohol abuse.      Significant Losses / Trauma / Abuse / Neglect Issues:   Patient did not  serve in the .  There are indications or report of significant loss, trauma, abuse or neglect issues related to: death of a friend committed suicide in 2019. .  Concerns for possible neglect are not present.     Safety Assessment:   Patient denies current homicidal ideation and behaviors.  Patient reports current self-injurious ideation.  Onset: 15, frequency: twice a week, duration: 1-2 minutes, intensity: superficial.  Client reports they are not currently engaging in self-injurious behaivor.. last engaged 2016.  Patient denied risk behaviors associated with substance use.  Patient denies any high risk behaviors associated with mental health symptoms.  Patient reports the following current concerns for their personal safety: None.  Patient reports there are firearms in the house.     yes, they are secured.     History of Safety Concerns:  Patient denied a history of homicidal ideation.     Patient denied a history of personal safety concerns.    Patient denied a history of assaultive behaviors.    Patient denied a history of sexual assault behaviors.     Patient denied a history of risk behaviors associated with substance use.  Patient denies any history of " high risk behaviors associated with mental health symptoms.  Patient reports the following protective factors: forward or future oriented thinking; dedication to family or friends; safe and stable environment; help seeking behaviors when distressed; effective problem solving skills; commitment to well being; positive social skills    Risk Plan:  See Recommendations for Safety and Risk Management Plan    Review of Symptoms per patient report:   Depression: Change in sleep, Lack of interest, Excessive or inappropriate guilt, Change in energy level, Change in appetite, Feelings of hopelessness, Feelings of helplessness, Irritability, Feeling sad, down, or depressed, Withdrawn and Poor hygeine  Anjelica:  No Symptoms  Psychosis: No Symptoms  Anxiety: Social anxiety and Irritability  Panic:  No symptoms  Post Traumatic Stress Disorder:  No Symptoms   Eating Disorder: No Symptoms  ADD / ADHD:  No symptoms  Conduct Disorder: No symptoms  Autism Spectrum Disorder: Deficits in social communication and social interactions, Deficits in developing, maintaining, and understanding relationships, Stereotyped or repetitive motor movements, use of objects, or speech, Highly restricted fixated interests that are abnormal in intensity or focus and Hyper or hyporeacitivty to sensory input or unusual interest in sensory aspects   Obsessive Compulsive Disorder: No Symptoms    Patient reports the following compulsive behaviors and treatment history: None.      Diagnostic Criteria: By history   Major Depressive Disorder  A) Recurrent episode(s) - symptoms have been present during the same 2-week period and represent a change from previous functioning 5 or more symptoms (required for diagnosis)   - Depressed mood. Note: In children and adolescents, can be irritable mood.     - Diminished interest or pleasure in all, or almost all, activities.    - Significant weight loss when not dieting decrease in appetite.    - Fatigue or loss of energy.     - Feelings of worthlessness or inappropriate guilt.    - Diminished ability to think or concentrate, or indecisiveness.   B) The symptoms cause clinically significant distress or impairment in social, occupational, or other important areas of functioning  D) The occurence of major depressive episode is not better explained by other thought / psychotic disorders  E) There has never been a manic episode or hypomanic episode    Functional Status:  Patient reports the following functional impairments:  social interactions.     Nonprogrammatic care:  Patient is requesting basic services to address current mental health concerns.    Clinical Summary:  1. Reason for assessment: Depression and pt states that he feels he may be on the spectrum.  2. Psychosocial, Cultural and Contextual Factors: None.  3. Principal DSM5 Diagnoses  (Sustained by DSM5 Criteria Listed Above):   296.32 (F33.1) Major Depressive Disorder, Recurrent Episode, Moderate _.  4. Other Diagnoses that is relevant to services:   n/a  5. 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  6. Prognosis: Expect Improvement.  7. Likely consequences of symptoms if not treated: If untreated, patient's mental health will likely deteriorate and may require a higher level of care.  8. Client strengths include:  has a previous history of therapy and open to learning .     Recommendations:     1. Plan for Safety and Risk Management:   Safety and Risk: Recommended that patient call 911 or go to the local ED should there be a change in any of  these risk factors..          Report to child / adult protection services was NA.     2. Patient's identified None.     3. Initial Treatment will focus on:    Depressed Mood  Psychological testing.     4. Resources/Service Plan:    services are not indicated.   Modifications to assist communication are not indicated.   Additional disability accommodations are not indicated.      5. Collaboration:   Collaboration / coordination of treatment will be initiated with the following support professionals: None.      6.  Referrals:   The following referral(s) will be initiated: [facility]  Psychiatry. Next Scheduled Appointment:    Date: Thursday, 1/5/2023  Time: 10:00 am - 11:00 am  Provider: Gabriel Null  MS  [facility]  Location: [facility], [address], [address], [place] [address]  Phone: [phone]  Type: Teletherapy    Patient Instructions  Please fill New Patient Form by using following link. All forms need to be completed 24 hours prior to the appointment date/time by going to [email or web address] Please call us on [phone] 24 hours prior to your scheduled appointment to confirm that you are able to attend. We will provide you information about how to log into video call software when you call.    Date: Thursday, 1/5/2023  Time: 2:00 pm - 3:00 pm  Provider: Kunal Manuel MA, CNPRN  Location: [facility], [address], [address], [place] [address]  Phone: [phone]  Type: Telepsychiatry    Patient Instructions  Please fill New Patient Form by using following link. All forms need to be completed 72 hours prior to the appointment date/time by going to [email or web address] Please call us on [phone] 96 hours prior to your scheduled appointment to confirm that you are able to attend. We will provide you information about how to log into video call software when you call.     A Release of Information has  been obtained for the following: None.     Emergency Contact Aimee Reeves (mother) Home: [phone], work: [phone], Mobile: [phone] was obtained.      Clinical Substantiation/medical necessity for the above recommendations:      Summary: Patient is a 22 year old male with a history of Anxiety Disorder and Depressionpresents for presents for an assessment of mental health needs. Patient has no history of psychiatric hospitalizations. Patient has a history of SI. Denies any concerns with current alcohol use. The patient's acute suicide risk was determined to be high due to the following factors: Hx suicidal thoughts. Patient denies experiencing command hallucinations, and has no immediate access to firearms. The patient's acute risk could be higher if noncompliant with their treatment plan, medications, follow-up appointments or using illicit substances or alcohol. Protective factors include: forward or future oriented thinking; dedication to family or friends; safe and stable environment; help seeking behaviors when distressed; effective problem solving skills; commitment to well being; positive social skills      Placement/Program/Barriers Identified: None    Referral: Therapy, psychiatry and psychological testing    7. KENNY:    KENNY:  Discussed the general effects of drugs and alcohol on health and well-being.  Recommendations: Decrease THC use.     8. Records:   These were reviewed at time of assessment.   Information in this assessment was obtained from the medical record and provided by patient who is a good historian.   Patient will have open access to their mental health medical record.    9.   Interactive Complexity: No      Provider Name/ Credentials:  Lulú Rothman, ASHLEY, LADC    January 3, 2023

## 2023-01-31 ENCOUNTER — OFFICE VISIT (OUTPATIENT)
Dept: FAMILY MEDICINE | Facility: CLINIC | Age: 23
End: 2023-01-31
Payer: COMMERCIAL

## 2023-01-31 VITALS
BODY MASS INDEX: 22.26 KG/M2 | TEMPERATURE: 96.9 F | HEART RATE: 71 BPM | OXYGEN SATURATION: 99 % | HEIGHT: 71 IN | DIASTOLIC BLOOD PRESSURE: 84 MMHG | SYSTOLIC BLOOD PRESSURE: 124 MMHG | RESPIRATION RATE: 20 BRPM | WEIGHT: 159 LBS

## 2023-01-31 DIAGNOSIS — Z00.00 ROUTINE GENERAL MEDICAL EXAMINATION AT A HEALTH CARE FACILITY: Primary | ICD-10-CM

## 2023-01-31 DIAGNOSIS — Z13.1 SCREENING FOR DIABETES MELLITUS: ICD-10-CM

## 2023-01-31 DIAGNOSIS — Z13.220 SCREENING FOR LIPID DISORDERS: ICD-10-CM

## 2023-01-31 LAB
CHOLEST SERPL-MCNC: 231 MG/DL
FASTING STATUS PATIENT QL REPORTED: NO
GLUCOSE SERPL-MCNC: 93 MG/DL (ref 70–99)
HDLC SERPL-MCNC: 84 MG/DL
LDLC SERPL CALC-MCNC: 128 MG/DL
NONHDLC SERPL-MCNC: 147 MG/DL
TRIGL SERPL-MCNC: 97 MG/DL

## 2023-01-31 PROCEDURE — 90686 IIV4 VACC NO PRSV 0.5 ML IM: CPT | Performed by: NURSE PRACTITIONER

## 2023-01-31 PROCEDURE — 80061 LIPID PANEL: CPT | Performed by: NURSE PRACTITIONER

## 2023-01-31 PROCEDURE — 90715 TDAP VACCINE 7 YRS/> IM: CPT | Performed by: NURSE PRACTITIONER

## 2023-01-31 PROCEDURE — 90471 IMMUNIZATION ADMIN: CPT | Performed by: NURSE PRACTITIONER

## 2023-01-31 PROCEDURE — 99395 PREV VISIT EST AGE 18-39: CPT | Mod: 25 | Performed by: NURSE PRACTITIONER

## 2023-01-31 PROCEDURE — 82947 ASSAY GLUCOSE BLOOD QUANT: CPT | Performed by: NURSE PRACTITIONER

## 2023-01-31 PROCEDURE — 90651 9VHPV VACCINE 2/3 DOSE IM: CPT | Performed by: NURSE PRACTITIONER

## 2023-01-31 PROCEDURE — 36415 COLL VENOUS BLD VENIPUNCTURE: CPT | Performed by: NURSE PRACTITIONER

## 2023-01-31 PROCEDURE — 90472 IMMUNIZATION ADMIN EACH ADD: CPT | Performed by: NURSE PRACTITIONER

## 2023-01-31 RX ORDER — BUPROPION HYDROCHLORIDE 150 MG/1
TABLET ORAL
COMMUNITY
Start: 2023-01-05

## 2023-01-31 RX ORDER — HYDROXYZINE HYDROCHLORIDE 50 MG/1
TABLET, FILM COATED ORAL
COMMUNITY
Start: 2023-01-05

## 2023-01-31 ASSESSMENT — ENCOUNTER SYMPTOMS
FEVER: 0
DIZZINESS: 0
SHORTNESS OF BREATH: 0
PALPITATIONS: 0
HEMATURIA: 0
HEMATOCHEZIA: 0
DIARRHEA: 0
SORE THROAT: 0
ARTHRALGIAS: 0
CHILLS: 0
FREQUENCY: 0
NAUSEA: 0
HEARTBURN: 0
JOINT SWELLING: 0
HEADACHES: 0
COUGH: 0
MYALGIAS: 0
CONSTIPATION: 0
EYE PAIN: 0
NERVOUS/ANXIOUS: 0
PARESTHESIAS: 0
DYSURIA: 0
ABDOMINAL PAIN: 0
WEAKNESS: 0

## 2023-01-31 ASSESSMENT — PAIN SCALES - GENERAL: PAINLEVEL: NO PAIN (0)

## 2023-01-31 NOTE — PROGRESS NOTES
"SUBJECTIVE:   CC: Ivan is an 22 year old who presents for preventative health visit.   Patient has been advised of split billing requirements and indicates understanding: Yes  Healthy Habits:     Getting at least 3 servings of Calcium per day:  NO    Bi-annual eye exam:  NO    Dental care twice a year:  NO    Sleep apnea or symptoms of sleep apnea:  None    Diet:  Regular (no restrictions)    Frequency of exercise:  None    Taking medications regularly:  Yes    Medication side effects:  None    PHQ-2 Total Score: 2    Additional concerns today:  Yes    Patient states that he has been having some blood sugar issues for a few weeks now. It has been happening a couple times a week. He starts to feel \"woozie\" and then after he eats something he feels better. It is pretty random when it is happening.  Eats a granola bar in the morning around 9:30 am and lunch around noonish. Symptoms and impact of symptoms varies.     Depression Followup    How are you doing with your depression since your last visit? Improved with sertraline and wellbutrin. Following with [facility] for mental health management.     Are you having other symptoms that might be associated with depression? No    Have you had a significant life event?  No     Are you feeling anxious or having panic attacks?   No    Do you have any concerns with your use of alcohol or other drugs? No    Social History     Tobacco Use     Smoking status: Never     Smokeless tobacco: Never   Vaping Use     Vaping Use: Never used   Substance Use Topics     Alcohol use: Yes     Drug use: Yes     Types: Marijuana     PHQ 12/16/2019 1/3/2022 6/21/2022   PHQ-9 Total Score 3 12 9   Q9: Thoughts of better off dead/self-harm past 2 weeks Not at all Not at all Not at all   Some encounter information is confidential and restricted. Go to Review Flowsheets activity to see all data.     KERON-7 SCORE 6/14/2016 12/6/2017 5/10/2019   Total Score - - -   Total Score 1 6 5       Today's PHQ-2 " Score:   PHQ-2 ( 1999 [employer]) 1/31/2023   Q1: Little interest or pleasure in doing things 1   Q2: Feeling down, depressed or hopeless 1   PHQ-2 Score 2   PHQ-2 Total Score (12-17 Years)- Positive if 3 or more points; Administer PHQ-A if positive -   Q1: Little interest or pleasure in doing things Several days   Q2: Feeling down, depressed or hopeless Several days   PHQ-2 Score 2       Have you ever done Advance Care Planning? (For example, a Health Directive, POLST, or a discussion with a medical provider or your loved ones about your wishes): No, advance care planning information given to patient to review.  Advanced care planning was discussed at today's visit.    Social History     Tobacco Use     Smoking status: Never     Smokeless tobacco: Never   Substance Use Topics     Alcohol use: Yes     If you drink alcohol do you typically have >3 drinks per day or >7 drinks per week? No    Alcohol Use 1/31/2023   Prescreen: >3 drinks/day or >7 drinks/week? No   Prescreen: >3 drinks/day or >7 drinks/week? -       Last PSA: No results found for: PSA    Reviewed orders with patient. Reviewed health maintenance and updated orders accordingly - Yes  Lab work is in process  Labs reviewed in EPIC  BP Readings from Last 3 Encounters:   01/31/23 124/84   06/27/22 122/81   06/21/22 118/72    Wt Readings from Last 3 Encounters:   01/31/23 72.1 kg (159 lb)   06/27/22 72.4 kg (159 lb 9.8 oz)   06/21/22 71.2 kg (157 lb)                  Patient Active Problem List   Diagnosis     CARDIOVASCULAR SCREENING; LDL GOAL LESS THAN 160     Anxiety     Depression, unspecified depression type     Past Surgical History:   Procedure Laterality Date     ANGIOGRAM Right 6/27/2022    Procedure: Placement of Right bacilic and second order catherization of IVC;  Surgeon: Brittany Anderson MD;  Location: [facility] OR     ARTHROSCOPY SHOULDER, OPEN DISTAL CLAVICLE REPAIR, COMBINED Right 6/27/2022    Procedure: Revision of resection of sternoclavicular joint with  allograft reconstruction using a 26cm Semitendinosus Tendon;  Surgeon: Luca Crane MD;  Location: [facility] OR     GENITOURINARY SURGERY       IR OR ANGIOGRAM  6/27/2022     none       ORCHIECTOMY SCROTAL  3/6/2014    Procedure: ORCHIECTOMY SCROTAL;  Scrotal Exploration,Left Orchidopexy, ,Possible Right Orchiectomy;  Surgeon: Nazario Alvarado MD;  Location: [facility]     STERNOPLASTY Right 9/27/2019    Procedure: Right Sternoclavicular Joint Reconstruction With Allograft;  Surgeon: Luca Crane MD;  Location: [facility] OR       Social History     Tobacco Use     Smoking status: Never     Smokeless tobacco: Never   Substance Use Topics     Alcohol use: Yes     Family History   Problem Relation Age of Onset     Cancer Maternal Grandmother      Hypertension Maternal Grandmother      Other Cancer Maternal Grandmother         pancreatic     Hypertension Maternal Grandfather      Cancer Paternal Grandmother         colon cancer     Colon Cancer Paternal Grandmother      Depression Brother      Depression Brother          Current Outpatient Medications   Medication Sig Dispense Refill     buPROPion (WELLBUTRIN XL) 150 MG 24 hr tablet Take 1 Tablet(s) once a day (in the morning)       hydrOXYzine (ATARAX) 50 MG tablet Take 1 tablet every 6 hours as needed for anxiety       sertraline (ZOLOFT) 50 MG tablet Take 1 Tablet(s) once a day (in the morning)       Allergies   Allergen Reactions     Nka [No Known Allergies]      Recent Labs   Lab Test 06/27/22  1026 03/02/15  1639   ALT  --  20   CR  --  0.95   GFRESTIMATED  --  GFR not calculated, patient <16 years old.  Non  GFR Calc     GFRESTBLACK  --  GFR not calculated, patient <16 years old.   GFR Calc     POTASSIUM 3.7 3.9   TSH  --  1.85        Reviewed and updated as needed this visit by clinical staff   Tobacco  Allergies  Meds  Problems  Med Hx  Surg Hx  Fam Hx          Reviewed and updated as needed this visit by  Provider                 Past Medical History:   Diagnosis Date     Chest pain      Depressive disorder 2015      Past Surgical History:   Procedure Laterality Date     ANGIOGRAM Right 6/27/2022    Procedure: Placement of Right bacilic and second order catherization of IVC;  Surgeon: Brittany Anderson MD;  Location: [facility]     ARTHROSCOPY SHOULDER, OPEN DISTAL CLAVICLE REPAIR, COMBINED Right 6/27/2022    Procedure: Revision of resection of sternoclavicular joint with allograft reconstruction using a 26cm Semitendinosus Tendon;  Surgeon: Luca Crane MD;  Location: [facility]     GENITOURINARY SURGERY       IR OR ANGIOGRAM  6/27/2022     none       ORCHIECTOMY SCROTAL  3/6/2014    Procedure: ORCHIECTOMY SCROTAL;  Scrotal Exploration,Left Orchidopexy, ,Possible Right Orchiectomy;  Surgeon: Nazario Alvarado MD;  Location: [facility]     STERNOPLASTY Right 9/27/2019    Procedure: Right Sternoclavicular Joint Reconstruction With Allograft;  Surgeon: Luca Crane MD;  Location: [facility] OR       Review of Systems   Constitutional: Negative for chills and fever.   HENT: Negative for congestion, ear pain, hearing loss and sore throat.    Eyes: Negative for pain and visual disturbance.   Respiratory: Negative for cough and shortness of breath.    Cardiovascular: Negative for chest pain, palpitations and peripheral edema.   Gastrointestinal: Negative for abdominal pain, constipation, diarrhea, heartburn, hematochezia and nausea.   Genitourinary: Negative for dysuria, frequency, genital sores, hematuria, impotence, penile discharge and urgency.   Musculoskeletal: Negative for arthralgias, joint swelling and myalgias.   Skin: Negative for rash.   Neurological: Negative for dizziness, weakness, headaches and paresthesias.   Psychiatric/Behavioral: Negative for mood changes. The patient is not nervous/anxious.        OBJECTIVE:   /84 (BP Location: Right arm, Patient Position: Sitting, Cuff Size: Adult Regular)    "Pulse 71   Temp 96.9  F (36.1  C) (Tympanic)   Resp 20   Ht 1.803 m (5' 11\")   Wt 72.1 kg (159 lb)   SpO2 99%   BMI 22.18 kg/m      Physical Exam  GENERAL: healthy, alert and no distress  EYES: Eyes grossly normal to inspection, PERRL and conjunctivae and sclerae normal  HENT: ear canals and TM's normal, nose and mouth without ulcers or lesions  NECK: no adenopathy, no asymmetry, masses, or scars and thyroid normal to palpation  RESP: lungs clear to auscultation - no rales, rhonchi or wheezes  CV: regular rate and rhythm, normal S1 S2, no S3 or S4, no murmur, click or rub, no peripheral edema and peripheral pulses strong  ABDOMEN: soft, nontender, no hepatosplenomegaly, no masses and bowel sounds normal  MS: no gross musculoskeletal defects noted, no edema  SKIN: no suspicious lesions or rashes  NEURO: Normal strength and tone, mentation intact and speech normal  PSYCH: mentation appears normal, affect normal/bright    ASSESSMENT/PLAN:       ICD-10-CM    1. Routine general medical examination at a health care facility  Z00.00       2. Screening for lipid disorders  Z13.220 Lipid panel reflex to direct LDL Non-fasting     Lipid panel reflex to direct LDL Non-fasting      3. Screening for diabetes mellitus  Z13.1 Glucose     Glucose        Healthy Male exam completed today.  I discussed preventative measures with the patient including continuing with lifestyle modifications of a balanced diet and regular cardiovascular exercise.  I discussed self testicular exams and how to complete these.  I encouraged patient to follow-up yearly for annual physicals and sooner as needed.       Patient has been advised of split billing requirements and indicates understanding: Yes      COUNSELING:   Reviewed preventive health counseling, as reflected in patient instructions       Regular exercise       Healthy diet/nutrition        He reports that he has never smoked. He has never used smokeless tobacco.        Jenifer EAST" ASHLEY Rodríguez CNP  [facility]

## 2023-01-31 NOTE — NURSING NOTE
Screening Questionnaire for Adult Immunization   Are you sick today? No  Do you have allergies to medications, food, a vaccine component or latex? No  Have you ever had a serious reaction after receiving a vaccination? No  Do you have a long-term health problem with heart, lung, kidney, metabolic disease (e.g. diabetes)disease, asthma,a blood disorder, no spleen, complement component deficiency, a cochlear implant, or a spinal fluid leak?  Are you on long-term aspirin therapy? No  Do you, or does a close family member, have cancer, leukemia, HIV/AIDS, or any other immune system problem? No  In the past 3 months, have you taken medications that affect your immune system, such as cortisone, prednisone, other steroids, or anticancer drugs; drugs for the teatment of rheumatoid arthritis, Crohn's disease, pr psoriasis;  or have you had radiation treatments? No  Have you had a seizure, or a brain or other nervous system problem? No  During the past year, have you received a transfusion of blood or blood products, or been given immune (gamma) globulin or antiviral drug? No  For women: Are you pregnant or is there a chance you could become pregnant during the next month? No  Have you received any vaccinations in the past 4 weeks? No    Immunization questionnaire answers were all negative.        Per orders of Dr. Jenifer Rodríguez, injection of Tdap, Hpv given by Ramya Ortega MA. Patient instructed to remain in clinic for 20 minutes afterwards, and to report any adverse reaction to me immediately.       Screening performed by Ramya Ortega MA on 1/31/2023 at 11:24 AM.

## 2023-06-14 ENCOUNTER — OFFICE VISIT (OUTPATIENT)
Dept: FAMILY MEDICINE | Facility: CLINIC | Age: 23
End: 2023-06-14
Payer: COMMERCIAL

## 2023-06-14 VITALS
RESPIRATION RATE: 16 BRPM | DIASTOLIC BLOOD PRESSURE: 80 MMHG | BODY MASS INDEX: 22.26 KG/M2 | SYSTOLIC BLOOD PRESSURE: 128 MMHG | TEMPERATURE: 97.4 F | WEIGHT: 159 LBS | HEART RATE: 80 BPM | HEIGHT: 71 IN | OXYGEN SATURATION: 99 %

## 2023-06-14 DIAGNOSIS — R11.0 NAUSEA: ICD-10-CM

## 2023-06-14 DIAGNOSIS — A08.4 VIRAL GASTROENTERITIS: Primary | ICD-10-CM

## 2023-06-14 PROCEDURE — 99213 OFFICE O/P EST LOW 20 MIN: CPT | Performed by: NURSE PRACTITIONER

## 2023-06-14 RX ORDER — QUETIAPINE FUMARATE 50 MG/1
1 TABLET, FILM COATED ORAL
COMMUNITY
Start: 2023-03-23

## 2023-06-14 RX ORDER — ONDANSETRON 4 MG/1
4 TABLET, ORALLY DISINTEGRATING ORAL EVERY 8 HOURS PRN
Qty: 20 TABLET | Refills: 0 | Status: SHIPPED | OUTPATIENT
Start: 2023-06-14

## 2023-06-14 RX ORDER — QUETIAPINE FUMARATE 100 MG/1
TABLET, FILM COATED ORAL
COMMUNITY
Start: 2023-03-01 | End: 2023-06-14 | Stop reason: DRUGHIGH

## 2023-06-14 ASSESSMENT — PAIN SCALES - GENERAL: PAINLEVEL: NO PAIN (0)

## 2023-06-14 ASSESSMENT — ENCOUNTER SYMPTOMS: NAUSEA: 1

## 2023-06-14 NOTE — PROGRESS NOTES
Assessment & Plan     Viral gastroenteritis    Reviewed typical course of gastritis and need for fluids and slowly introduce bland foods.  Note written for missed work.      Nausea    - ondansetron (ZOFRAN ODT) 4 MG ODT tab; Take 1 tablet (4 mg) by mouth every 8 hours as needed for nausea  Discussed how to take the medication(s), expected outcomes, potential side effects.             See Patient Instructions  Patient Instructions   Continue to rest, push fluids.  Try the Zofran for nausea.  Follow up if any worsening.      Our Clinic hours are:  Mondays    7:20 am - 7 pm  Tues -  Fri  7:20 am - 5 pm    Clinic Phone: [phone]    The clinic lab opens at 7:30 am Mon - Fri and appointments are required.    [facility] [place]  Ph. [phone]  Monday  8 am - 7pm  Tues - Fri 8 am - 5:30 pm           ASHLEY Addison CNP  [facility]    Henna Love is a 23 year old, presenting for the following health issues:  Nausea        6/14/2023     6:51 AM   Additional Questions   Roomed by    Accompanied by Mom     Nausea  Associated symptoms include nausea.   History of Present Illness       Reason for visit:  Nausea  Symptom onset:  3-7 days ago  Symptoms include:  Nausea and vomiting  Symptom intensity:  Moderate  Symptom progression:  Staying the same  Had these symptoms before:  No  What makes it worse:  Eating, drinking  What makes it better:  Laying down    He eats 0-1 servings of fruits and vegetables daily.He consumes 1 sweetened beverage(s) daily.He exercises with enough effort to increase his heart rate 9 or less minutes per day.  He exercises with enough effort to increase his heart rate 3 or less days per week. He is missing 1 dose(s) of medications per week.  He is not taking prescribed medications regularly due to remembering to take.       3 days ago while celebrating his birthday with family, they ate and he immediately felt sick to his stomach. That  "persisted and he threw up. He's no longer throwing up. He denies pain, he denies any changes in bowels. No fever. He reports lingering nausea.  Current Outpatient Medications   Medication     buPROPion (WELLBUTRIN XL) 150 MG 24 hr tablet     hydrOXYzine (ATARAX) 50 MG tablet     ondansetron (ZOFRAN ODT) 4 MG ODT tab     QUEtiapine (SEROQUEL) 50 MG tablet     sertraline (ZOLOFT) 50 MG tablet     No current facility-administered medications for this visit.     Past Medical History:   Diagnosis Date     Chest pain      Depressive disorder 2015             Review of Systems   Gastrointestinal: Positive for nausea.      Constitutional, HEENT, cardiovascular, pulmonary, gi and gu systems are negative, except as otherwise noted.      Objective    /80   Pulse 80   Temp 97.4  F (36.3  C) (Tympanic)   Resp 16   Ht 1.803 m (5' 11\")   Wt 72.1 kg (159 lb)   SpO2 99%   BMI 22.18 kg/m    Body mass index is 22.18 kg/m .  Physical Exam   GENERAL: healthy, alert and no distress  HENT: ear canals and TM's normal, pharynx without erythema  NECK: no adenopathy, no asymmetry  RESP: lungs clear to auscultation - no rales, rhonchi or wheezes  CV: regular rate and rhythm, normal S1 S2, no S3 or S4, no murmur  ABDOMEN: soft, nontender, no hepatosplenomegaly, no masses and bowel sounds normal, no CVA tenderness  MS: no gross musculoskeletal defects noted                      "

## 2023-06-14 NOTE — PATIENT INSTRUCTIONS
Continue to rest, push fluids.  Try the Zofran for nausea.  Follow up if any worsening.      Our Clinic hours are:  Mondays    7:20 am - 7 pm  Tues -  Fri  7:20 am - 5 pm    Clinic Phone: [phone]    The clinic lab opens at 7:30 am Mon - Fri and appointments are required.    [facility]  Ph. [phone]  Monday  8 am - 7pm  Tues - Fri 8 am - 5:30 pm

## 2023-06-14 NOTE — LETTER
June 14, 2023      Ivan Guzman  [id number] [place] [place] [place] [address]        To Whom It May Concern:    Ivan Guzman  was seen on 6/14/12.  Please excuse him  until 6/12/23 through 6/15/23 due to illness.        Sincerely,        ASHLEY Addison CNP

## 2023-06-15 ENCOUNTER — OFFICE VISIT (OUTPATIENT)
Dept: URGENT CARE | Facility: URGENT CARE | Age: 23
End: 2023-06-15
Payer: COMMERCIAL

## 2023-06-15 ENCOUNTER — HOSPITAL ENCOUNTER (EMERGENCY)
Facility: CLINIC | Age: 23
Discharge: HOME | End: 2023-06-15
Attending: EMERGENCY MEDICINE | Admitting: EMERGENCY MEDICINE
Payer: COMMERCIAL

## 2023-06-15 VITALS
WEIGHT: 160 LBS | BODY MASS INDEX: 22.32 KG/M2 | TEMPERATURE: 98.2 F | RESPIRATION RATE: 16 BRPM | OXYGEN SATURATION: 97 % | DIASTOLIC BLOOD PRESSURE: 62 MMHG | HEART RATE: 91 BPM | SYSTOLIC BLOOD PRESSURE: 120 MMHG

## 2023-06-15 VITALS
HEART RATE: 79 BPM | BODY MASS INDEX: 22.45 KG/M2 | TEMPERATURE: 98.1 F | SYSTOLIC BLOOD PRESSURE: 141 MMHG | DIASTOLIC BLOOD PRESSURE: 78 MMHG | WEIGHT: 161 LBS | OXYGEN SATURATION: 98 %

## 2023-06-15 DIAGNOSIS — R11.2 NAUSEA AND VOMITING, UNSPECIFIED VOMITING TYPE: ICD-10-CM

## 2023-06-15 DIAGNOSIS — R11.2 NAUSEA AND VOMITING, UNSPECIFIED VOMITING TYPE: Primary | ICD-10-CM

## 2023-06-15 LAB
ALBUMIN SERPL BCG-MCNC: 5.1 G/DL (ref 3.5–5.2)
ALP SERPL-CCNC: 113 U/L (ref 40–129)
ALT SERPL W P-5'-P-CCNC: 27 U/L (ref 0–70)
ANION GAP SERPL CALCULATED.3IONS-SCNC: 20 MMOL/L (ref 7–15)
AST SERPL W P-5'-P-CCNC: 22 U/L (ref 0–45)
BASOPHILS # BLD AUTO: 0.1 10E3/UL (ref 0–0.2)
BASOPHILS NFR BLD AUTO: 1 %
BILIRUB SERPL-MCNC: 0.8 MG/DL
BUN SERPL-MCNC: 22.4 MG/DL (ref 6–20)
CALCIUM SERPL-MCNC: 9.8 MG/DL (ref 8.6–10)
CHLORIDE SERPL-SCNC: 96 MMOL/L (ref 98–107)
CREAT SERPL-MCNC: 1.01 MG/DL (ref 0.67–1.17)
DEPRECATED HCO3 PLAS-SCNC: 21 MMOL/L (ref 22–29)
EOSINOPHIL # BLD AUTO: 0 10E3/UL (ref 0–0.7)
EOSINOPHIL NFR BLD AUTO: 0 %
ERYTHROCYTE [DISTWIDTH] IN BLOOD BY AUTOMATED COUNT: 11.9 % (ref 10–15)
GFR SERPL CREATININE-BSD FRML MDRD: >90 ML/MIN/1.73M2
GLUCOSE SERPL-MCNC: 85 MG/DL (ref 70–99)
HCT VFR BLD AUTO: 47.5 % (ref 40–53)
HGB BLD-MCNC: 17.1 G/DL (ref 13.3–17.7)
IMM GRANULOCYTES # BLD: 0 10E3/UL
IMM GRANULOCYTES NFR BLD: 0 %
LIPASE SERPL-CCNC: 21 U/L (ref 13–60)
LYMPHOCYTES # BLD AUTO: 1 10E3/UL (ref 0.8–5.3)
LYMPHOCYTES NFR BLD AUTO: 10 %
MCH RBC QN AUTO: 31.3 PG (ref 26.5–33)
MCHC RBC AUTO-ENTMCNC: 36 G/DL (ref 31.5–36.5)
MCV RBC AUTO: 87 FL (ref 78–100)
MONOCYTES # BLD AUTO: 0.9 10E3/UL (ref 0–1.3)
MONOCYTES NFR BLD AUTO: 10 %
NEUTROPHILS # BLD AUTO: 7.2 10E3/UL (ref 1.6–8.3)
NEUTROPHILS NFR BLD AUTO: 79 %
NRBC # BLD AUTO: 0 10E3/UL
NRBC BLD AUTO-RTO: 0 /100
PLATELET # BLD AUTO: 318 10E3/UL (ref 150–450)
POTASSIUM SERPL-SCNC: 3.5 MMOL/L (ref 3.4–5.3)
PROT SERPL-MCNC: 8.2 G/DL (ref 6.4–8.3)
RBC # BLD AUTO: 5.46 10E6/UL (ref 4.4–5.9)
SODIUM SERPL-SCNC: 137 MMOL/L (ref 136–145)
WBC # BLD AUTO: 9.2 10E3/UL (ref 4–11)

## 2023-06-15 PROCEDURE — 80053 COMPREHEN METABOLIC PANEL: CPT | Performed by: EMERGENCY MEDICINE

## 2023-06-15 PROCEDURE — 99284 EMERGENCY DEPT VISIT MOD MDM: CPT | Performed by: EMERGENCY MEDICINE

## 2023-06-15 PROCEDURE — 258N000003 HC RX IP 258 OP 636: Performed by: EMERGENCY MEDICINE

## 2023-06-15 PROCEDURE — 99284 EMERGENCY DEPT VISIT MOD MDM: CPT | Mod: 25

## 2023-06-15 PROCEDURE — 85025 COMPLETE CBC W/AUTO DIFF WBC: CPT | Performed by: EMERGENCY MEDICINE

## 2023-06-15 PROCEDURE — 83690 ASSAY OF LIPASE: CPT | Performed by: EMERGENCY MEDICINE

## 2023-06-15 PROCEDURE — 36415 COLL VENOUS BLD VENIPUNCTURE: CPT | Performed by: EMERGENCY MEDICINE

## 2023-06-15 PROCEDURE — 96361 HYDRATE IV INFUSION ADD-ON: CPT

## 2023-06-15 PROCEDURE — 250N000011 HC RX IP 250 OP 636: Performed by: EMERGENCY MEDICINE

## 2023-06-15 PROCEDURE — 99207 PR NO CHARGE LOS: CPT | Performed by: NURSE PRACTITIONER

## 2023-06-15 PROCEDURE — 96374 THER/PROPH/DIAG INJ IV PUSH: CPT

## 2023-06-15 PROCEDURE — 250N000013 HC RX MED GY IP 250 OP 250 PS 637: Performed by: EMERGENCY MEDICINE

## 2023-06-15 RX ORDER — PROCHLORPERAZINE MALEATE 10 MG
10 TABLET ORAL EVERY 6 HOURS PRN
Qty: 30 TABLET | Refills: 0 | Status: SHIPPED | OUTPATIENT
Start: 2023-06-15

## 2023-06-15 RX ORDER — METOCLOPRAMIDE 10 MG/1
10 TABLET ORAL 4 TIMES DAILY PRN
Qty: 21 TABLET | Refills: 1 | Status: SHIPPED | OUTPATIENT
Start: 2023-06-15 | End: 2023-06-15

## 2023-06-15 RX ORDER — SODIUM CHLORIDE 9 MG/ML
INJECTION, SOLUTION INTRAVENOUS CONTINUOUS
Status: DISCONTINUED | OUTPATIENT
Start: 2023-06-15 | End: 2023-06-15 | Stop reason: HOSPADM

## 2023-06-15 RX ORDER — METOCLOPRAMIDE 10 MG/1
10 TABLET ORAL ONCE
Status: COMPLETED | OUTPATIENT
Start: 2023-06-15 | End: 2023-06-15

## 2023-06-15 RX ADMIN — SODIUM CHLORIDE 1000 ML: 9 INJECTION, SOLUTION INTRAVENOUS at 18:02

## 2023-06-15 RX ADMIN — METOCLOPRAMIDE 10 MG: 10 TABLET ORAL at 20:02

## 2023-06-15 RX ADMIN — PROCHLORPERAZINE EDISYLATE 10 MG: 5 INJECTION, SOLUTION INTRAMUSCULAR; INTRAVENOUS at 18:02

## 2023-06-15 RX ADMIN — SODIUM CHLORIDE, POTASSIUM CHLORIDE, SODIUM LACTATE AND CALCIUM CHLORIDE 1000 ML: 600; 310; 30; 20 INJECTION, SOLUTION INTRAVENOUS at 18:48

## 2023-06-15 ASSESSMENT — ACTIVITIES OF DAILY LIVING (ADL): ADLS_ACUITY_SCORE: 35

## 2023-06-15 NOTE — ED TRIAGE NOTES
Was seen yesterday and given zofran. Went to [place] and was sent here. Has taken 3 doses of zofran and hasn't helped. Nausea and vomiting x 4 days

## 2023-06-15 NOTE — PROGRESS NOTES
SUBJECTIVE:   Ivan Guzman is a 23 year old male presenting with a chief complaint of   Chief Complaint   Patient presents with     Dizziness     Lightheaded and nausea since Sunday. He was seen yesterday and given zofran which he has been taking but has not been helpful at all. He has not been able to eat or drink much since Sunday. Mom reports they did go out for dinner on Sunday evening and thought maybe he had food poisoning.          Pt nausea not improved with zofran from a clinic visit yesterday.   BP elevated from yesterday, unable to keep liquids down today. Recommend ER for further evaluation and IV fluids.

## 2023-06-15 NOTE — ED NOTES
Pt states that he has been feeling nauseous and has vomited up to 5x per day since Sunday. Today, pt has vomited 3x today and has now noticed lightheadedness as well. No blood in emesis.

## 2023-06-16 NOTE — DISCHARGE INSTRUCTIONS
Compazine as needed for nausea, vomiting.     You can also try Zofran in addition to the above.    Stick to bland diet.

## 2023-06-16 NOTE — ED PROVIDER NOTES
History     Chief Complaint   Patient presents with     Nausea & Vomiting     HPI  Ivan Guzman is a 23 year old male who presents to the emergency department with concerns regarding nausea, and vomiting.  Patient had been seen in clinic yesterday, and prescribed Zofran.  I reviewed office visit.  Patient was prescribed Zofran for his concerns regarding gastritis, with nausea, and vomiting.    Patient presents today with his mother.  History obtained from patient, and mother.  States that he has had countless episodes of vomiting over the past approximately 4 days.  Keeping some fluids down.  Not able to keep foods down.  Has tried Zofran without relief.  No other ill contacts.  No recent travel.  No recent antibiotic use.  Denies any fever.  Denies any abdominal pain.    Allergies:  Allergies   Allergen Reactions     Nka [No Known Allergies]        Problem List:    Patient Active Problem List    Diagnosis Date Noted     Depression, unspecified depression type 12/11/2017     Priority: Medium     Anxiety 04/13/2015     Priority: Medium     CARDIOVASCULAR SCREENING; LDL GOAL LESS THAN 160 12/14/2012     Priority: Medium        Past Medical History:    Past Medical History:   Diagnosis Date     Chest pain      Depressive disorder 2015       Past Surgical History:    Past Surgical History:   Procedure Laterality Date     ANGIOGRAM Right 6/27/2022    Procedure: Placement of Right bacilic and second order catherization of IVC;  Surgeon: Brittany Anderson MD;  Location: [facility] OR     ARTHROSCOPY SHOULDER, OPEN DISTAL CLAVICLE REPAIR, COMBINED Right 6/27/2022    Procedure: Revision of resection of sternoclavicular joint with allograft reconstruction using a 26cm Semitendinosus Tendon;  Surgeon: Luca Crane MD;  Location: [facility] OR     GENITOURINARY SURGERY       IR OR ANGIOGRAM  6/27/2022     none       ORCHIECTOMY SCROTAL  3/6/2014    Procedure: ORCHIECTOMY SCROTAL;  Scrotal Exploration,Left Orchidopexy, ,Possible Right  Orchiectomy;  Surgeon: Nazario Alvarado MD;  Location: [facility] OR     STERNOPLASTY Right 9/27/2019    Procedure: Right Sternoclavicular Joint Reconstruction With Allograft;  Surgeon: Luca Crane MD;  Location: [facility] OR       Family History:    Family History   Problem Relation Age of Onset     Cancer Maternal Grandmother      Hypertension Maternal Grandmother      Other Cancer Maternal Grandmother         pancreatic     Hypertension Maternal Grandfather      Cancer Paternal Grandmother         colon cancer     Colon Cancer Paternal Grandmother      Depression Brother      Depression Brother        Social History:  Marital Status:  Single [1]  Social History     Tobacco Use     Smoking status: Never     Smokeless tobacco: Never   Vaping Use     Vaping status: Never Used   Substance Use Topics     Alcohol use: Yes     Drug use: Yes     Types: Marijuana        Medications:    prochlorperazine (COMPAZINE) 10 MG tablet  buPROPion (WELLBUTRIN XL) 150 MG 24 hr tablet  hydrOXYzine (ATARAX) 50 MG tablet  ondansetron (ZOFRAN ODT) 4 MG ODT tab  QUEtiapine (SEROQUEL) 50 MG tablet  sertraline (ZOLOFT) 50 MG tablet          Review of Systems    Physical Exam   BP: 127/81  Pulse: 75  Temp: 98.2  F (36.8  C)  Resp: 20  Weight: 72.6 kg (160 lb)  SpO2: 98 %      Physical Exam    ED Course          Procedures              Critical Care time:  none               Results for orders placed or performed during the hospital encounter of 06/15/23 (from the past 24 hour(s))   CBC with platelets differential    Narrative    The following orders were created for panel order CBC with platelets differential.  Procedure                               Abnormality         Status                     ---------                               -----------         ------                     CBC with platelets and d...[[id number]]                      Final result                 Please view results for these tests on the individual orders.    Comprehensive metabolic panel   Result Value Ref Range    Sodium 137 136 - 145 mmol/L    Potassium 3.5 3.4 - 5.3 mmol/L    Chloride 96 (L) 98 - 107 mmol/L    Carbon Dioxide (CO2) 21 (L) 22 - 29 mmol/L    Anion Gap 20 (H) 7 - 15 mmol/L    Urea Nitrogen 22.4 (H) 6.0 - 20.0 mg/dL    Creatinine 1.01 0.67 - 1.17 mg/dL    Calcium 9.8 8.6 - 10.0 mg/dL    Glucose 85 70 - 99 mg/dL    Alkaline Phosphatase 113 40 - 129 U/L    AST 22 0 - 45 U/L    ALT 27 0 - 70 U/L    Protein Total 8.2 6.4 - 8.3 g/dL    Albumin 5.1 3.5 - 5.2 g/dL    Bilirubin Total 0.8 <=1.2 mg/dL    GFR Estimate >90 >60 mL/min/1.73m2   Lipase   Result Value Ref Range    Lipase 21 13 - 60 U/L   CBC with platelets and differential   Result Value Ref Range    WBC Count 9.2 4.0 - 11.0 10e3/uL    RBC Count 5.46 4.40 - 5.90 10e6/uL    Hemoglobin 17.1 13.3 - 17.7 g/dL    Hematocrit 47.5 40.0 - 53.0 %    MCV 87 78 - 100 fL    MCH 31.3 26.5 - 33.0 pg    MCHC 36.0 31.5 - 36.5 g/dL    RDW 11.9 10.0 - 15.0 %    Platelet Count 318 150 - 450 10e3/uL    % Neutrophils 79 %    % Lymphocytes 10 %    % Monocytes 10 %    % Eosinophils 0 %    % Basophils 1 %    % Immature Granulocytes 0 %    NRBCs per 100 WBC 0 <1 /100    Absolute Neutrophils 7.2 1.6 - 8.3 10e3/uL    Absolute Lymphocytes 1.0 0.8 - 5.3 10e3/uL    Absolute Monocytes 0.9 0.0 - 1.3 10e3/uL    Absolute Eosinophils 0.0 0.0 - 0.7 10e3/uL    Absolute Basophils 0.1 0.0 - 0.2 10e3/uL    Absolute Immature Granulocytes 0.0 <=0.4 10e3/uL    Absolute NRBCs 0.0 10e3/uL       Medications   0.9% sodium chloride BOLUS (0 mLs Intravenous Stopped 6/15/23 9204)     Followed by   sodium chloride 0.9% infusion (0 mLs Intravenous Stopped 6/15/23 1916)   prochlorperazine (COMPAZINE) injection 10 mg (10 mg Intravenous $Given 6/15/23 1802)   lactated ringers BOLUS 1,000 mL (0 mLs Intravenous Stopped 6/15/23 1950)   metoclopramide (REGLAN) tablet 10 mg (10 mg Oral $Given 6/15/23 2002)       Assessments & Plan (with Medical Decision  Making)  23 year old male presenting with multiple episodes of nonbloody, nonbilious emesis over the past many days.  Patient arrives with mother.  Had recent clinic visit, and I reviewed that visit as documented above.    IV established, and labs drawn.  CBC is normal, with normal white blood cell count.  CMP shows slightly elevated anion gap, consistent with the patient's dehydration, which is likely secondary to viral gastroenteritis.  He denies any abdominal pain, and his abdominal exam is benign.  He is afebrile, and no other concerns regarding potential bacterial etiology.  He has no diarrhea.    Patient given 2 L crystalloid.  He felt much improved.  He was also given Compazine.  I will prescribe compazine for home.   Patient given 1 tablet of Reglan for home.  Mistakenly ordered reglan, meaning to do compazine, but either should be fine for the patient.  Position ordered for home and sent to martell Espino[facility].    They had been encouraged bland diet, pushing fluids, and follow-up as needed.     I have reviewed the nursing notes.    I have reviewed the findings, diagnosis, plan and need for follow up with the patient.               Discharge Medication List as of 6/15/2023  7:51 PM      START taking these medications    Details   metoclopramide (REGLAN) 10 MG tablet Take 1 tablet (10 mg) by mouth 4 times daily as needed (nausea or vomiting), Disp-21 tablet, R-1, E-Prescribe             Final diagnoses:   Nausea and vomiting, unspecified vomiting type       6/15/2023   [facility] EMERGENCY DEPT     Malcom Mojica MD  06/15/23 2018

## 2023-08-29 ENCOUNTER — E-VISIT (OUTPATIENT)
Dept: FAMILY MEDICINE | Facility: CLINIC | Age: 23
End: 2023-08-29
Payer: COMMERCIAL

## 2023-08-29 DIAGNOSIS — R51.9 NONINTRACTABLE HEADACHE, UNSPECIFIED CHRONICITY PATTERN, UNSPECIFIED HEADACHE TYPE: Primary | ICD-10-CM

## 2023-08-29 PROCEDURE — 99207 PR NON-BILLABLE SERV PER CHARTING: CPT | Performed by: NURSE PRACTITIONER

## 2023-08-29 NOTE — PATIENT INSTRUCTIONS
Thank you for choosing us for your care. I think an in-clinic visit would be best next steps based on your symptoms. Please schedule a clinic appointment; you won t be charged for this eVisit.      You can schedule an appointment right here in [facility], or call [phone]

## 2023-11-01 ENCOUNTER — TELEPHONE (OUTPATIENT)
Dept: FAMILY MEDICINE | Facility: CLINIC | Age: 23
End: 2023-11-01

## 2023-11-01 NOTE — TELEPHONE ENCOUNTER
Patient calling with cough, runny nose and congestion since Monday 10/30.   Positive home covid test.   Requesting clinic test.   Advised clinic test would be the same as home covid test.   Declines Paxlovid treatment at this time.   Advised to isolate until symptoms resolve X 24 hours, then wear a mask for 5 days if in public.   Practice good hand washing, push fluids and get rest.   Go to ER if chest pain or shortness of breath.   Information on Covid sent via my chart from [facility] [place], [facility] notes.     Charlene Luciano RN on 11/1/2023 at 8:31 AM

## 2023-12-14 ENCOUNTER — HOSPITAL ENCOUNTER (EMERGENCY)
Facility: CLINIC | Age: 23
Discharge: HOME | End: 2023-12-14
Attending: EMERGENCY MEDICINE | Admitting: EMERGENCY MEDICINE
Payer: COMMERCIAL

## 2023-12-14 VITALS
DIASTOLIC BLOOD PRESSURE: 85 MMHG | RESPIRATION RATE: 22 BRPM | SYSTOLIC BLOOD PRESSURE: 128 MMHG | WEIGHT: 170 LBS | OXYGEN SATURATION: 96 % | HEIGHT: 71 IN | BODY MASS INDEX: 23.8 KG/M2 | TEMPERATURE: 98.2 F | HEART RATE: 82 BPM

## 2023-12-14 DIAGNOSIS — F32.A DEPRESSION, UNSPECIFIED DEPRESSION TYPE: ICD-10-CM

## 2023-12-14 PROCEDURE — 99283 EMERGENCY DEPT VISIT LOW MDM: CPT | Performed by: EMERGENCY MEDICINE

## 2023-12-14 PROCEDURE — 99284 EMERGENCY DEPT VISIT MOD MDM: CPT | Performed by: EMERGENCY MEDICINE

## 2023-12-14 ASSESSMENT — ACTIVITIES OF DAILY LIVING (ADL)
ADLS_ACUITY_SCORE: 35
ADLS_ACUITY_SCORE: 35

## 2023-12-14 NOTE — CONSULTS
Diagnostic Evaluation Consultation  Crisis Assessment    Patient Name: Ivan Guzman  Age:  23 year old  Legal Sex: male  Gender Identity: male  Pronouns:   Race: White  Ethnicity: Not  or   Language: English    Patient was assessed: Virtual: [employer] Crisis Assessment Start Time: 1104 Crisis Assessment Stop Time: 1148  Patient location: [facility] EMERGENCY DEPT  [id number]    Referral Data and Chief Complaint  Ivan Guzman presents to the ED with family/friends. Patient is presenting to the ED for the following concerns: Suicidal ideation.   Factors that make the mental health crisis life threatening or complex are:  Pt presents to ED with Mother Aimee stating he has been struggling with suicidal thoughts since last Friday. Pt identifies no triggers for the thoughts. He has a med provider who manages his mental health, and he takes his medications as prescribed. No recent medication changes and last saw his provider a few months ago. Pt reports 6-8 hours of sleep each night. Pt works full time in a [employer], has been working here for 7 months and enjoys his work. Pt is not in a relationship currently and lives with a roommate in a home.    Informed Consent and Assessment Methods  Explained the crisis assessment process, including applicable information disclosures and limits to confidentiality, assessed understanding of the process, and obtained consent to proceed with the assessment.  Assessment methods included conducting a formal interview with patient, review of medical records, collaboration with medical staff, and obtaining relevant collateral information from family and community providers when available.  : done     Patient response to interventions: verbalizes understanding, acceptance expressed  Coping skills were attempted to reduce the crisis:  Music, exercise, therapy and med management.     History of the Crisis   Pt has a hx of depression, anxiety and schizophrenia. Pt  takes medications and reports medication compliance. Pt is not currently in therapy but has attended in the past, reports it was helpful at the time. Pt denied any previous IP MH placements, day treatment, PHP, [facility] programming or detox admissions. Pt states he does not feel he requires IP MH placement at this time. Pt is willing to contact his previous therapist to schedule an appointment with them. Pt also plans to call his med provider to schedule an updated med appointment.    Brief Psychosocial History  Family:  Single, Children no  Support System:  Parent(s), Sibling(s), Grandparent(s), Other (specify) (Roommate)  Employment Status:  employed full-time  Source of Income:  salary/wages  Financial Environmental Concerns:  none  Current Hobbies:  music, outdoor activities, group/social activities, reading, social media/computer activities, games  Barriers in Personal Life:  lack of motivation    Significant Clinical History  Current Anxiety Symptoms:  anxious, excessive worry, racing thoughts  Current Depression/Trauma:  hopelessness, helplessness, withdrawl/isolation, negativistic, crying or feels like crying, avoidance, difficulty concentrating  Current Somatic Symptoms:   (Pt denied)  Current Psychosis/Thought Disturbance:  forgetful  Current Eating Symptoms:  loss of appetite  Chemical Use History:  Alcohol: None  Benzodiazepines: None  Opiates: None  Cocaine: None  Marijuana: Other (comments) (A few times per week. Pt reports vaping usually and smoking.)  Last Use:: 12/13/23  Other Use: None  Withdrawal Symptoms:  (None endorsed or observed)  Addictions:  (None endorsed)   Past diagnosis:  Anxiety Disorder, Depression, Schizophrenia  Family history:  Depression, Anxiety Disorder  Past treatment:  Individual therapy, Psychiatric Medication Management  Details of most recent treatment:  Pt reports taking medications at this time. Under a year ago pt participated in individual therapy. Pt states he stopped  because he felt like things were starting to get better for him.     Collateral Information  Is there collateral information: No (Mother was not present for assessment, previous attempts to reach mother were unsuccessful.)      Risk Assessment  [place] Suicide Severity Rating Scale Full Clinical Version:  Suicidal Ideation  Q1 Wish to be Dead (Lifetime): Yes  Q2 Non-Specific Active Suicidal Thoughts (Lifetime): Yes  3. Active Suicidal Ideation with any Methods (Not Plan) Without Intent to Act (Lifetime): Yes  Q4 Active Suicidal Ideation with Some Intent to Act, Without Specific Plan (Lifetime): Yes  Q5 Active Suicidal Ideation with Specific Plan and Intent (Lifetime): Yes  Q6 Suicide Behavior (Lifetime): no     Suicidal Behavior (Lifetime)  Actual Attempt (Lifetime): No  Has subject engaged in non-suicidal self-injurious behavior? (Lifetime): Yes (Pt reports NSSI started in High School- about 6-7 years ago in the form of cutting. Never needing medical attention.)  Interrupted Attempts (Lifetime): No  Aborted or Self-Interrupted Attempt (Lifetime): No  Preparatory Acts or Behavior (Lifetime): No    [place] Suicide Severity Rating Scale Recent:   Suicidal Ideation (Recent)  Q1 Wished to be Dead (Past Month): yes  Q2 Suicidal Thoughts (Past Month): yes  Q3 Suicidal Thought Method: no  Q4 Suicidal Intent without Specific Plan: no  Q5 Suicide Intent with Specific Plan: no  Level of Risk per Screen: low risk  Intensity of Ideation (Recent)  Most Severe Ideation Rating (Past 1 Month): 3  Frequency (Past 1 Month): 2-5 times in week  Duration (Past 1 Month): 1-4 hours/a lot of time  Controllability (Past 1 Month): Can control thoughts with some difficulty  Deterrents (Past 1 Month): Deterrents probably stopped you  Reasons for Ideation (Past 1 Month): Mostly to end or stop the pain (You couldn't go on living with the pain or how you were feeling)  Suicidal Behavior (Recent)  Actual Attempt (Past 3 Months): No  Total Number  of Actual Attempts (Past 3 Months): 0  Has subject engaged in non-suicidal self-injurious behavior? (Past 3 Months): No  Interrupted Attempts (Past 3 Months): No  Total Number of Interrupted Attempts (Past 3 Months): 0  Aborted or Self-Interrupted Attempt (Past 3 Months): No  Total Number of Aborted or Self-Interrupted Attempts (Past 3 Months): 0  Preparatory Acts or Behavior (Past 3 Months): No  Total Number of Preparatory Acts (Past 3 Months): 0    Environmental or Psychosocial Events: other (see comment) (Reports suicidal ideation only with no specific triggers)  Protective Factors: Protective Factors: lives in a responsibly safe and stable environment, strong bond to family unit, community support, or employment, help seeking, good problem-solving, coping, and conflict resolution skills, good impulse control, sense of importance of health and wellness, responsibilities and duties to others, including pets and children    Does the patient have thoughts of harming others? Feels Like Hurting Others: no  Previous Attempt to Hurt Others: no  Current presentation:  (None observed or endorsed)  Is the patient engaging in sexually inappropriate behavior?: no    Is the patient engaging in sexually inappropriate behavior?  no        Mental Status Exam   Affect: Flat  Appearance: Disheveled  Attention Span/Concentration: Attentive  Eye Contact: Engaged    Fund of Knowledge: Appropriate   Language /Speech Content: Fluent  Language /Speech Volume: Soft  Language /Speech Rate/Productions: Minimally Responsive  Recent Memory: Intact  Remote Memory: Intact  Mood: Depressed, Sad  Orientation to Person: Yes   Orientation to Place: Yes  Orientation to Time of Day: Yes  Orientation to Date: Yes     Situation (Do they understand why they are here?): Yes  Psychomotor Behavior: Normal  Thought Content: Other (please comment) (suicidal ideation, no plan ot intent to harm currently)  Thought Form: Intact     Medication  Psychotropic  medications: See medical chart     Current Care Team  Patient Care Team:  Clinic - [facility] as PCP - Luca Orozco MD as MD (Orthopedics)  Brittany Anderson MD as Assigned Heart and Vascular Provider  Jenifer Rodríguez APRN CNP as Assigned PCP  Sara Rojas PA-C as Assigned Musculoskeletal Provider    Diagnosis  Patient Active Problem List   Diagnosis Code    CARDIOVASCULAR SCREENING; LDL GOAL LESS THAN 160 Z13.6    Anxiety F41.9    Depression, unspecified depression type F32.A     Primary Problem This Admission  Active Hospital Problems  F32.9  Depression, unspecified depression type    Clinical Summary and Substantiation of Recommendations   After therapeutic assessment, intervention, and aftercare planning by ED care team and LMHP and in consultation with attending provider, the patient's circumstances and mental state were appropriate for outpatient management. It is the recommendation of this clinician that pt discharge with OP MH support. Currently the pt is not presenting as an acute risk to self or others due to the following factors: Pt denied any active or current intent or plans to harm himself. Pt has had suciidal idations for the past week. Pt denied any HI or NSSI. Pt developed a safety plan and reports feeling safe to discharge home with OP follow up at this time.    Patient coping skills attempted to reduce the crisis:  Music, exercise, therapy and med management.    Disposition  Recommended disposition: Individual Therapy, Medication Management        Reviewed case and recommendations with attending provider. Attending Name: Dr Bowens       Attending concurs with disposition: yes       Patient and/or validated legal guardian concurs with disposition:   yes       Final disposition:  discharge    Legal status on admission: Voluntary/Patient has signed consent for treatment    Assessment Details   Total duration spent with the patient: 44 min     CPT code(s) utilized:  57139 - Psychotherapy for Crisis - 60 (30-74*) min    Eda Ramirez [facility], Psychotherapist  [facility] - Triage & Transition Services  Callback: [phone]

## 2023-12-14 NOTE — DISCHARGE INSTRUCTIONS
Follow-up with your prescribing physician to see if any medication changes are warranted.    Follow-up with your therapist to see if you can get an appointment in the next few days.    Behavioral health services will reach out to you tomorrow to see how you are feeling and to help arrange therapy appointment if you have been unable to get in touch with your therapist.    Return to the emergency department for any worsening thoughts of self-harm or other concerns.

## 2023-12-14 NOTE — ED NOTES
Concerns of self harm since Friday. Pt is seeing therapist. Is having suicidal thoughts, does not have a plan.

## 2023-12-14 NOTE — ED PROVIDER NOTES
History     Chief Complaint   Patient presents with    Mental Health Problem     Thoughts of self harm     HPI  Ivan Guzman is a 23 year old male who has a past history of schizophrenia and depression.  He presents to the emergency department with his mother stating that he is having increasing thoughts of self-harm.  He reports he has tried to hurt himself in the past by cutting.  When he did this he was doing this to try to cope with feelings that he was having.  He does not have a specific plan on how he would hurt himself.  He denies auditory or visual hallucinations.  He denies thoughts of hurting others.  He occasionally uses marijuana but states he has not done this today.  He does report that he has been taking his medications as prescribed.  He reports that he began feeling this way about 1 week ago but cannot identify a specific trigger.    Allergies:  Allergies   Allergen Reactions    Nka [No Known Allergies]        Problem List:    Patient Active Problem List    Diagnosis Date Noted    Depression, unspecified depression type 12/11/2017     Priority: Medium    Anxiety 04/13/2015     Priority: Medium    CARDIOVASCULAR SCREENING; LDL GOAL LESS THAN 160 12/14/2012     Priority: Medium        Past Medical History:    Past Medical History:   Diagnosis Date    Chest pain     Depressive disorder 2015       Past Surgical History:    Past Surgical History:   Procedure Laterality Date    ANGIOGRAM Right 6/27/2022    Procedure: Placement of Right bacilic and second order catherization of IVC;  Surgeon: Brittany Anderson MD;  Location: [facility] OR    ARTHROSCOPY SHOULDER, OPEN DISTAL CLAVICLE REPAIR, COMBINED Right 6/27/2022    Procedure: Revision of resection of sternoclavicular joint with allograft reconstruction using a 26cm Semitendinosus Tendon;  Surgeon: Luca Crane MD;  Location: [facility] OR    GENITOURINARY SURGERY      IR OR ANGIOGRAM  6/27/2022    none      ORCHIECTOMY SCROTAL  3/6/2014    Procedure: ORCHIECTOMY  "SCROTAL;  Scrotal Exploration,Left Orchidopexy, ,Possible Right Orchiectomy;  Surgeon: Nazario Alvarado MD;  Location: [facility] OR    STERNOPLASTY Right 9/27/2019    Procedure: Right Sternoclavicular Joint Reconstruction With Allograft;  Surgeon: Luca Crane MD;  Location: [facility] OR       Family History:    Family History   Problem Relation Age of Onset    Cancer Maternal Grandmother     Hypertension Maternal Grandmother     Other Cancer Maternal Grandmother         pancreatic    Hypertension Maternal Grandfather     Cancer Paternal Grandmother         colon cancer    Colon Cancer Paternal Grandmother     Depression Brother     Depression Brother        Social History:  Marital Status:  Single [1]  Social History     Tobacco Use    Smoking status: Never    Smokeless tobacco: Never   Vaping Use    Vaping Use: Never used   Substance Use Topics    Alcohol use: Yes    Drug use: Yes     Types: Marijuana        Medications:    buPROPion (WELLBUTRIN XL) 150 MG 24 hr tablet  hydrOXYzine (ATARAX) 50 MG tablet  ondansetron (ZOFRAN ODT) 4 MG ODT tab  prochlorperazine (COMPAZINE) 10 MG tablet  QUEtiapine (SEROQUEL) 50 MG tablet  sertraline (ZOLOFT) 50 MG tablet          Review of Systems    Physical Exam   BP: 128/85  Pulse: 82  Temp: 98.2  F (36.8  C)  Resp: 22  Height: 180.3 cm (5' 11\")  Weight: 77.1 kg (170 lb)  SpO2: 96 %      Physical Exam  Vitals and nursing note reviewed.   Constitutional:       General: He is not in acute distress.     Appearance: He is well-developed. He is not ill-appearing or toxic-appearing.   HENT:      Head: Normocephalic and atraumatic.   Eyes:      General: No scleral icterus.     Pupils: Pupils are equal, round, and reactive to light.   Cardiovascular:      Rate and Rhythm: Normal rate.   Pulmonary:      Effort: Pulmonary effort is normal. No respiratory distress.   Musculoskeletal:      Cervical back: Normal range of motion and neck supple.   Skin:     General: Skin is warm and " dry.      Coloration: Skin is not pale.      Findings: No rash.   Neurological:      Mental Status: He is alert and oriented to person, place, and time.      Sensory: No sensory deficit.   Psychiatric:         Attention and Perception: Attention and perception normal.         Mood and Affect: Mood is depressed. Affect is flat.         Speech: Speech normal.         Behavior: Behavior is withdrawn.         Thought Content: Thought content is not paranoid or delusional. Thought content includes suicidal ideation. Thought content does not include homicidal ideation. Thought content does not include suicidal plan.         ED Course                No results found for this or any previous visit (from the past 24 hour(s)).    Medications - No data to display    Assessments & Plan (with Medical Decision Making)     I have reviewed the nursing notes.    I have reviewed the findings, diagnosis, plan and need for follow up with the patient.  This patient presented to the emergency department with increased depression and thoughts of self-harm.  He did not have a specific plan.  He did speak with the mental health .  They discussed inpatient versus outpatient management and patient currently does not want to be admitted inpatient.  He reports that he would return to the emergency department for worsening thoughts.  He is considering staying with his mother for the next 2 days.  The  spoke with the patient about contacting his prescribing physician to see if any medication changes were indicated and reaching out to his therapist.  The diagnostic evaluation center will reach out to the patient tomorrow to see how he is feeling and if he has not been able to reach out to his therapist they will help arrange therapy for him.  Patient and his mother are agreeable with this plan and patient was discharged in good condition.        New Prescriptions    No medications on file       Final diagnoses:   Depression,  unspecified depression type       12/14/2023   [facility] EMERGENCY DEPT       Keon Bowens MD  12/14/23 1300

## 2023-12-14 NOTE — ED TRIAGE NOTES
Patient states he has been struggling with self harm thoughts since last Friday He has a doctor who manages his mental health and he takes his medications     Triage Assessment (Adult)       Row Name 12/14/23 1005          Triage Assessment    Airway WDL WDL        Respiratory WDL    Respiratory WDL WDL        Skin Circulation/Temperature WDL    Skin Circulation/Temperature WDL WDL        Cardiac WDL    Cardiac WDL WDL        Peripheral/Neurovascular WDL    Peripheral Neurovascular WDL WDL        Cognitive/Neuro/Behavioral WDL    Cognitive/Neuro/Behavioral WDL WDL

## 2023-12-17 ENCOUNTER — TELEPHONE (OUTPATIENT)
Dept: EMERGENCY MEDICINE | Facility: CLINIC | Age: 23
End: 2023-12-17
Payer: COMMERCIAL

## 2023-12-17 NOTE — TELEPHONE ENCOUNTER
Forms/Letter Request    Type of form/letter: Work    Have you been seen for this request: Yes Was seen at [facility] ED but forgot to ask for a work note     Do we have the form/letter: Yes:  note for work    When is form/letter needed by: 3-5 days    How would you like the form/letter returned:  Put into [employer]    Patient Notified form requests are processed in 3-5 business days:Yes    Could we send this information to you in [employer] or would you prefer to receive a phone call?:   Patient would like to be contacted via [employer]

## 2023-12-18 NOTE — TELEPHONE ENCOUNTER
Called and spoke with pt to get more information on work note details. Pt stated he no longer needs the work note. No further questions at this time.    DOMI Alaniz.

## 2024-04-07 ENCOUNTER — HEALTH MAINTENANCE LETTER (OUTPATIENT)
Age: 24
End: 2024-04-07

## 2025-04-19 ENCOUNTER — HEALTH MAINTENANCE LETTER (OUTPATIENT)
Age: 25
End: 2025-04-19

## (undated) DEVICE — SU FIBERWIRE 2 38" T-8 NDL  AR-7206

## (undated) DEVICE — ESU ELEC BLADE 2.75" COATED/INSULATED E1455

## (undated) DEVICE — GLIDEWIRE TERUMO .035X180 ANG STIFF GS3508

## (undated) DEVICE — LINEN TOWEL PACK X5 5464

## (undated) DEVICE — PAD CHUX UNDERPAD 23X24" 7136

## (undated) DEVICE — GLOVE PROTEXIS MICRO 6.5  2D73PM65

## (undated) DEVICE — PACK ADULT HEART UMMC PV15CG92D

## (undated) DEVICE — GLOVE PROTEXIS POWDER FREE SMT 6.5  2D72PT65X

## (undated) DEVICE — ESU PENCIL W/COATED BLADE E2450H

## (undated) DEVICE — SUTURE BOOTS 051003PBX

## (undated) DEVICE — COVER CAMERA VIDEO LASER 9X96" 04-CC227

## (undated) DEVICE — SUCTION TIP YANKAUER STR K87

## (undated) DEVICE — LINEN TOWEL PACK X6 WHITE 5487

## (undated) DEVICE — ESU PENCIL SMOKE EVAC W/ROCKER SWITCH 0703-047-000

## (undated) DEVICE — Device

## (undated) DEVICE — SUCTION MANIFOLD DORNOCH ULTRA CART UL-CL500

## (undated) DEVICE — DRSG STERI STRIP 1/2X4" R1547

## (undated) DEVICE — INFLATION DEVICE ENCORE  26 PRESSURE GAUGE M001151050

## (undated) DEVICE — SUCTION TIP YANKAUER W/O VENT K86

## (undated) DEVICE — KIT MICRO-INTRODUCER STIFFEN 4FR 7274V

## (undated) DEVICE — CLIP HORIZON SM RED WIDE SLOT 001201

## (undated) DEVICE — SU VICRYL 2-0 CT-1 27" UND J259H

## (undated) DEVICE — GOWN REINFORCED XXLG 9071

## (undated) DEVICE — LINEN TOWEL PACK X30 5481

## (undated) DEVICE — DRAPE SPLIT SHEET 77X108 REINFORCED 29436

## (undated) DEVICE — INTRO SHEATH BRITE TIP 8FRX11CM 401-811M

## (undated) DEVICE — IMM SHOULDER  ABDUCT ULTRASLING III MED 11-0449-3

## (undated) DEVICE — PREP CHLORAPREP 26ML TINTED ORANGE  260815

## (undated) DEVICE — ESU GROUND PAD ADULT W/CORD E7507

## (undated) DEVICE — DRAPE SHEET REV FOLD 3/4 9349

## (undated) DEVICE — SU FIBERWIRE 2 38"  AR-7200

## (undated) DEVICE — ESU ELEC BLADE HEX-LOCKING 2.5" E1450X

## (undated) DEVICE — SYR BULB IRRIG 50ML LATEX FREE 0035280

## (undated) DEVICE — GLOVE LINER COTTON MED 32-2076

## (undated) DEVICE — GLOVE PROTEXIS W/NEU-THERA 8.5  2D73TE85

## (undated) DEVICE — SPONGE LAP 18X18" X8435

## (undated) DEVICE — GLOVE PROTEXIS POWDER FREE 8.5 ORTHOPEDIC 2D73ET85

## (undated) DEVICE — SOL NACL 0.9% IRRIG 1000ML BOTTLE 2F7124

## (undated) DEVICE — DEFIB PADPRO CONMED ADULT/CHILD 2001Z-C

## (undated) DEVICE — COVER CAMERA IN-LIGHT DISP LT-C02

## (undated) DEVICE — BLADE SAW STERNAL 20X30MM KM-32

## (undated) DEVICE — CATH ANGIO ANG GLIDE 5FRX65CM CG507

## (undated) DEVICE — DRAPE IOBAN INCISE 23X17" 6650EZ

## (undated) DEVICE — DRAPE STOCKINETTE IMPERVIOUS 10" 21048

## (undated) DEVICE — BONE WAX 2.5GM W31G

## (undated) DEVICE — TOURNIQUET VASCULAR KIT 7 1/2" 79012

## (undated) DEVICE — SOL WATER IRRIG 1000ML BOTTLE 2F7114

## (undated) DEVICE — DRAPE CONVERTORS U-DRAPE 60X72" 8476

## (undated) DEVICE — BLADE CLIPPER SGL USE 9680

## (undated) DEVICE — BLADE SAW OSCIL/SAG STRK MICRO 9X25X0.64MM 2296-033-522

## (undated) DEVICE — SU PROLENE 5-0 RB-2DA 30" 8710H

## (undated) DEVICE — SU SILK 0 TIE 6X30" A306H

## (undated) DEVICE — DRAPE SHEET MED 44X70" 9355

## (undated) DEVICE — STOPCOCK 3-WAY HIGH PRESSURE (NAMIC) 70055009

## (undated) DEVICE — SYR BULB IRRIG DOVER 60 ML LATEX FREE 67000

## (undated) DEVICE — SU MONOCRYL 3-0 PS-1 27" Y936H

## (undated) DEVICE — GUIDEWIRE ROSEN CVD .035X260CM G01253 THSCF-35-260-1.5-ROSEN

## (undated) DEVICE — SU VICRYL 2-0 SH 27" UND J417H

## (undated) DEVICE — STRAP STIRRUP W/SLIP 30187-030

## (undated) DEVICE — ESU CLEANER TIP 31142717

## (undated) DEVICE — DRAPE MAYO STAND 23X54 8337

## (undated) DEVICE — CLIP HORIZON MED BLUE 002200

## (undated) DEVICE — DRAPE TIBURON CARDIOVASCULAR PERI-GROIN LF 9154

## (undated) DEVICE — TAPE MICROFOAM 4" 1528-4

## (undated) DEVICE — SU ETHIBOND 0 CT-1 CR 8X18" CX21D

## (undated) DEVICE — DRAPE STERI U 1015

## (undated) RX ORDER — LIDOCAINE HYDROCHLORIDE 10 MG/ML
INJECTION, SOLUTION EPIDURAL; INFILTRATION; INTRACAUDAL; PERINEURAL
Status: DISPENSED
Start: 2022-01-04

## (undated) RX ORDER — LIDOCAINE HYDROCHLORIDE 20 MG/ML
INJECTION, SOLUTION EPIDURAL; INFILTRATION; INTRACAUDAL; PERINEURAL
Status: DISPENSED
Start: 2022-06-27

## (undated) RX ORDER — ONDANSETRON 2 MG/ML
INJECTION INTRAMUSCULAR; INTRAVENOUS
Status: DISPENSED
Start: 2019-09-27

## (undated) RX ORDER — FENTANYL CITRATE 50 UG/ML
INJECTION, SOLUTION INTRAMUSCULAR; INTRAVENOUS
Status: DISPENSED
Start: 2019-09-27

## (undated) RX ORDER — PROPOFOL 10 MG/ML
INJECTION, EMULSION INTRAVENOUS
Status: DISPENSED
Start: 2019-09-27

## (undated) RX ORDER — TRANEXAMIC ACID 650 MG/1
TABLET ORAL
Status: DISPENSED
Start: 2022-06-27

## (undated) RX ORDER — CEFAZOLIN SODIUM/WATER 2 G/20 ML
SYRINGE (ML) INTRAVENOUS
Status: DISPENSED
Start: 2022-06-27

## (undated) RX ORDER — HYDROMORPHONE HYDROCHLORIDE 1 MG/ML
INJECTION, SOLUTION INTRAMUSCULAR; INTRAVENOUS; SUBCUTANEOUS
Status: DISPENSED
Start: 2019-09-27

## (undated) RX ORDER — PROPOFOL 10 MG/ML
INJECTION, EMULSION INTRAVENOUS
Status: DISPENSED
Start: 2022-06-27

## (undated) RX ORDER — ONDANSETRON 2 MG/ML
INJECTION INTRAMUSCULAR; INTRAVENOUS
Status: DISPENSED
Start: 2022-06-27

## (undated) RX ORDER — FENTANYL CITRATE 50 UG/ML
INJECTION, SOLUTION INTRAMUSCULAR; INTRAVENOUS
Status: DISPENSED
Start: 2022-06-27

## (undated) RX ORDER — DEXAMETHASONE SODIUM PHOSPHATE 4 MG/ML
INJECTION, SOLUTION INTRA-ARTICULAR; INTRALESIONAL; INTRAMUSCULAR; INTRAVENOUS; SOFT TISSUE
Status: DISPENSED
Start: 2019-09-27

## (undated) RX ORDER — TRIAMCINOLONE ACETONIDE 40 MG/ML
INJECTION, SUSPENSION INTRA-ARTICULAR; INTRAMUSCULAR
Status: DISPENSED
Start: 2022-01-04

## (undated) RX ORDER — GLYCOPYRROLATE 0.2 MG/ML
INJECTION, SOLUTION INTRAMUSCULAR; INTRAVENOUS
Status: DISPENSED
Start: 2019-09-27

## (undated) RX ORDER — OXYCODONE HYDROCHLORIDE 5 MG/1
TABLET ORAL
Status: DISPENSED
Start: 2022-06-27

## (undated) RX ORDER — ACETAMINOPHEN 325 MG/1
TABLET ORAL
Status: DISPENSED
Start: 2022-06-27

## (undated) RX ORDER — HEPARIN SODIUM 1000 [USP'U]/ML
INJECTION, SOLUTION INTRAVENOUS; SUBCUTANEOUS
Status: DISPENSED
Start: 2022-06-27

## (undated) RX ORDER — GLYCOPYRROLATE 0.2 MG/ML
INJECTION, SOLUTION INTRAMUSCULAR; INTRAVENOUS
Status: DISPENSED
Start: 2022-06-27

## (undated) RX ORDER — DEXAMETHASONE SODIUM PHOSPHATE 4 MG/ML
INJECTION, SOLUTION INTRA-ARTICULAR; INTRALESIONAL; INTRAMUSCULAR; INTRAVENOUS; SOFT TISSUE
Status: DISPENSED
Start: 2022-06-27

## (undated) RX ORDER — LIDOCAINE HYDROCHLORIDE 20 MG/ML
INJECTION, SOLUTION EPIDURAL; INFILTRATION; INTRACAUDAL; PERINEURAL
Status: DISPENSED
Start: 2019-09-27